# Patient Record
Sex: FEMALE | Race: WHITE | NOT HISPANIC OR LATINO | Employment: FULL TIME | URBAN - METROPOLITAN AREA
[De-identification: names, ages, dates, MRNs, and addresses within clinical notes are randomized per-mention and may not be internally consistent; named-entity substitution may affect disease eponyms.]

---

## 2021-10-22 ENCOUNTER — OFFICE VISIT (OUTPATIENT)
Dept: ORTHOPEDIC SURGERY | Facility: CLINIC | Age: 41
End: 2021-10-22

## 2021-10-22 VITALS
BODY MASS INDEX: 37.69 KG/M2 | DIASTOLIC BLOOD PRESSURE: 93 MMHG | SYSTOLIC BLOOD PRESSURE: 143 MMHG | WEIGHT: 199.6 LBS | HEART RATE: 66 BPM | HEIGHT: 61 IN

## 2021-10-22 DIAGNOSIS — E66.01 MORBID OBESITY (HCC): ICD-10-CM

## 2021-10-22 DIAGNOSIS — M67.52 PLICA OF KNEE, LEFT: ICD-10-CM

## 2021-10-22 DIAGNOSIS — M17.0 PRIMARY OSTEOARTHRITIS OF BOTH KNEES: Primary | ICD-10-CM

## 2021-10-22 PROBLEM — Z87.898 HISTORY OF SEIZURE: Status: ACTIVE | Noted: 2020-12-02

## 2021-10-22 PROBLEM — F41.1 GAD (GENERALIZED ANXIETY DISORDER): Status: ACTIVE | Noted: 2020-12-02

## 2021-10-22 PROBLEM — E03.9 HYPOTHYROIDISM: Status: ACTIVE | Noted: 2021-10-22

## 2021-10-22 PROBLEM — I10 HYPERTENSION: Status: ACTIVE | Noted: 2021-10-22

## 2021-10-22 PROCEDURE — 20610 DRAIN/INJ JOINT/BURSA W/O US: CPT | Performed by: PHYSICIAN ASSISTANT

## 2021-10-22 PROCEDURE — 99203 OFFICE O/P NEW LOW 30 MIN: CPT | Performed by: PHYSICIAN ASSISTANT

## 2021-10-22 RX ORDER — ACETAMINOPHEN, ASPIRIN AND CAFFEINE 250; 250; 65 MG/1; MG/1; MG/1
1 TABLET, FILM COATED ORAL
COMMUNITY
End: 2022-06-18

## 2021-10-22 RX ORDER — MULTIVIT-MIN/IRON/FOLIC ACID/K 18-600-40
CAPSULE ORAL
COMMUNITY
End: 2022-06-18

## 2021-10-22 RX ORDER — MELOXICAM 15 MG/1
15 TABLET ORAL DAILY
Qty: 30 TABLET | Refills: 0 | Status: SHIPPED | OUTPATIENT
Start: 2021-10-22 | End: 2022-02-01

## 2021-10-22 RX ORDER — TRIAMTERENE AND HYDROCHLOROTHIAZIDE 37.5; 25 MG/1; MG/1
1 CAPSULE ORAL DAILY
COMMUNITY
Start: 2021-07-27

## 2021-10-22 RX ORDER — TRIAMCINOLONE ACETONIDE 40 MG/ML
80 INJECTION, SUSPENSION INTRA-ARTICULAR; INTRAMUSCULAR
Status: COMPLETED | OUTPATIENT
Start: 2021-10-22 | End: 2021-10-22

## 2021-10-22 RX ORDER — LIDOCAINE HYDROCHLORIDE 10 MG/ML
2 INJECTION, SOLUTION EPIDURAL; INFILTRATION; INTRACAUDAL; PERINEURAL
Status: COMPLETED | OUTPATIENT
Start: 2021-10-22 | End: 2021-10-22

## 2021-10-22 RX ADMIN — LIDOCAINE HYDROCHLORIDE 2 ML: 10 INJECTION, SOLUTION EPIDURAL; INFILTRATION; INTRACAUDAL; PERINEURAL at 14:36

## 2021-10-22 RX ADMIN — TRIAMCINOLONE ACETONIDE 80 MG: 40 INJECTION, SUSPENSION INTRA-ARTICULAR; INTRAMUSCULAR at 14:36

## 2021-10-22 NOTE — PATIENT INSTRUCTIONS
"Osteoarthritis    Osteoarthritis is a type of arthritis. It refers to joint pain or joint disease. Osteoarthritis affects tissue that covers the ends of bones in joints (cartilage). Cartilage acts as a cushion between the bones and helps them move smoothly. Osteoarthritis occurs when cartilage in the joints gets worn down. Osteoarthritis is sometimes called \"wear and tear\" arthritis.  Osteoarthritis is the most common form of arthritis. It often occurs in older people. It is a condition that gets worse over time. The joints most often affected by this condition are in the fingers, toes, hips, knees, and spine, including the neck and lower back.  What are the causes?  This condition is caused by the wearing down of cartilage that covers the ends of bones.  What increases the risk?  The following factors may make you more likely to develop this condition:  · Being age 50 or older.  · Obesity.  · Overuse of joints.  · Past injury of a joint.  · Past surgery on a joint.  · Family history of osteoarthritis.  What are the signs or symptoms?  The main symptoms of this condition are pain, swelling, and stiffness in the joint. Other symptoms may include:  · An enlarged joint.  · More pain and further damage caused by small pieces of bone or cartilage that break off and float inside of the joint.  · Small deposits of bone (osteophytes) that grow on the edges of the joint.  · A grating or scraping feeling inside the joint when you move it.  · Popping or creaking sounds when you move.  · Difficulty walking or exercising.  · An inability to  items, twist your hand(s), or control the movements of your hands and fingers.  How is this diagnosed?  This condition may be diagnosed based on:  · Your medical history.  · A physical exam.  · Your symptoms.  · X-rays of the affected joint(s).  · Blood tests to rule out other types of arthritis.  How is this treated?  There is no cure for this condition, but treatment can help control " pain and improve joint function. Treatment may include a combination of therapies, such as:  · Pain relief techniques, such as:  ? Applying heat and cold to the joint.  ? Massage.  ? A form of talk therapy called cognitive behavioral therapy (CBT). This therapy helps you set goals and follow up on the changes that you make.  · Medicines for pain and inflammation. The medicines can be taken by mouth or applied to the skin. They include:  ? NSAIDs, such as ibuprofen.  ? Prescription medicines.  ? Strong anti-inflammatory medicines (corticosteroids).  ? Certain nutritional supplements.  · A prescribed exercise program. You may work with a physical therapist.  · Assistive devices, such as a brace, wrap, splint, specialized glove, or cane.  · A weight control plan.  · Surgery, such as:  ? An osteotomy. This is done to reposition the bones and relieve pain or to remove loose pieces of bone and cartilage.  ? Joint replacement surgery. You may need this surgery if you have advanced osteoarthritis.  Follow these instructions at home:  Activity  · Rest your affected joints as told by your health care provider.  · Exercise as told by your health care provider. He or she may recommend specific types of exercise, such as:  ? Strengthening exercises. These are done to strengthen the muscles that support joints affected by arthritis.  ? Aerobic activities. These are exercises, such as brisk walking or water aerobics, that increase your heart rate.  ? Range-of-motion activities. These help your joints move more easily.  ? Balance and agility exercises.  Managing pain, stiffness, and swelling         · If directed, apply heat to the affected area as often as told by your health care provider. Use the heat source that your health care provider recommends, such as a moist heat pack or a heating pad.  ? If you have a removable assistive device, remove it as told by your health care provider.  ? Place a towel between your skin and the  heat source. If your health care provider tells you to keep the assistive device on while you apply heat, place a towel between the assistive device and the heat source.  ? Leave the heat on for 20-30 minutes.  ? Remove the heat if your skin turns bright red. This is especially important if you are unable to feel pain, heat, or cold. You may have a greater risk of getting burned.  · If directed, put ice on the affected area. To do this:  ? If you have a removable assistive device, remove it as told by your health care provider.  ? Put ice in a plastic bag.  ? Place a towel between your skin and the bag. If your health care provider tells you to keep the assistive device on during icing, place a towel between the assistive device and the bag.  ? Leave the ice on for 20 minutes, 2-3 times a day.  ? Move your fingers or toes often to reduce stiffness and swelling.  ? Raise (elevate) the injured area above the level of your heart while you are sitting or lying down.  General instructions  · Take over-the-counter and prescription medicines only as told by your health care provider.  · Maintain a healthy weight. Follow instructions from your health care provider for weight control.  · Do not use any products that contain nicotine or tobacco, such as cigarettes, e-cigarettes, and chewing tobacco. If you need help quitting, ask your health care provider.  · Use assistive devices as told by your health care provider.  · Keep all follow-up visits as told by your health care provider. This is important.  Where to find more information  · [place] of Arthritis and Musculoskeletal and Skin Diseases: www.niams.nih.gov  · [place] on Aging: www.aysha.nih.gov  · American College of Rheumatology: www.rheumatology.org  Contact a health care provider if:  · You have redness, swelling, or a feeling of warmth in a joint that gets worse.  · You have a fever along with joint or muscle aches.  · You develop a rash.  · You  have trouble doing your normal activities.  Get help right away if:  · You have pain that gets worse and is not relieved by pain medicine.  Summary  · Osteoarthritis is a type of arthritis that affects tissue covering the ends of bones in joints (cartilage).  · This condition is caused by the wearing down of cartilage that covers the ends of bones.  · The main symptom of this condition is pain, swelling, and stiffness in the joint.  · There is no cure for this condition, but treatment can help control pain and improve joint function.  This information is not intended to replace advice given to you by your health care provider. Make sure you discuss any questions you have with your health care provider.  Document Revised: 12/14/2020 Document Reviewed: 12/14/2020  [employer] Patient Education © 2021 [employer]      Preventing Health Risks of Being Overweight  Maintaining a healthy body weight is an important part of your overall health. Your healthy body weight depends on your age, gender, and height. Being overweight puts you at risk for many health problems, including:  · Heart disease.  · Diabetes.  · Problems sleeping.  · Joint problems.  You can make changes to your diet and lifestyle to prevent these risks. Consider working with a health care provider or a dietitian to make these changes.  What nutrition changes can be made?    · Eat only as much as your body needs. In most cases, this is about 2,000 calories a day, but the amount varies depending on your height, gender, and activity level. Ask your health care provider how many calories you should have each day. Eating more than your body needs on a regular basis can cause you to become overweight or obese.  · Eat slowly, and stop eating when you feel full.  · Choose healthy foods, including:  ? Fruits and vegetables.  ? Lean meats.  ? Low-fat dairy products.  ? High-fiber foods, such as whole grains and beans.  ? Healthy snacks like vegetable sticks, a piece  of fruit, or a small amount of yogurt or cheese.  · Avoid foods and drinks that are high in sugar, salt (sodium), saturated fat, or trans fat. This includes:  ? Many desserts such as candy, cookies, and ice cream.  ? Soda.  ? Fried foods.  ? Processed meats such as hot dogs or lunch meats.  ? Prepackaged snack foods.  What lifestyle changes can be made?    · Exercise for at least 150 minutes a week to prevent weight gain, or as often as recommended by your health care provider. Do moderate-intensity exercise, such as brisk walking.  ? Spread it out by exercising for 30 minutes 5 days a week, or in short 10-minute bursts several times a day.  · Find other ways to stay active and burn calories, such as yard work or a hobby that involves physical activity.  · Get at least 8 hours of sleep each night. When you are well-rested, you are more likely to be active and make healthy choices during the day. To sleep better:  ? Try to go to bed and wake up at about the same time every day.  ? Keep your bedroom dark, quiet, and cool.  ? Make sure that your bed is comfortable.  ? Avoid stimulating activities, such as watching television or exercising, for at least one hour before bedtime.  Why are these changes important?  Eating healthy and being active helps you lose weight and prevent health problems caused by being overweight. Making these changes can also help you manage stress, feel better mentally, and connect with friends and family.  What can happen if changes are not made?  Being overweight can affect you for your entire life. You may develop joint or bone problems that make it painful or difficult for you to play sports or do activities you enjoy. Being overweight puts stress on your heart and lungs and can lead to medical problems like diabetes, heart disease, and sleeping problems.  Where to find support  You can get support for preventing health risks of being overweight from:  · Your health care provider or a  dietitian. They can provide guidance about healthy eating and healthy lifestyle choices.  · Weight loss support groups, online or in-person.  Where to find more information  · MyPlate: www.choosemyplate.gov  ? This an online tool that provides personalized recommendations about foods to eat each day.  · The Centers for Disease Control and Prevention: www.cdc.gov/healthyweight  ? This resource gives tips for managing weight and having an active lifestyle.  Summary  · To prevent unhealthy weight gain, it is important to maintain a healthy diet high in vegetables and whole grains, exercise regularly, and get at least 8 hours of sleep each night.  · Making these changes helps prevent many long-term (chronic) health conditions that can shorten your life, such as diabetes, heart disease, and stroke.  This information is not intended to replace advice given to you by your health care provider. Make sure you discuss any questions you have with your health care provider.  Document Revised: 04/15/2021 Document Reviewed: 04/15/2021  [employer] Patient Education © 2021 [employer]      Knee Injection  A knee injection is a procedure to get medicine into your knee joint to relieve the pain, swelling, and stiffness of arthritis. Your health care provider uses a needle to inject medicine, which may also help to lubricate and cushion your knee joint. You may need more than one injection.  Tell a health care provider about:  · Any allergies you have.  · All medicines you are taking, including vitamins, herbs, eye drops, creams, and over-the-counter medicines.  · Any problems you or family members have had with anesthetic medicines.  · Any blood disorders you have.  · Any surgeries you have had.  · Any medical conditions you have.  · Whether you are pregnant or may be pregnant.  What are the risks?  Generally, this is a safe procedure. However, problems may occur, including:  · Infection.  · Bleeding.  · Symptoms that get  worse.  · Damage to the area around your knee.  · Allergic reaction to any of the medicines.  · Skin reactions from repeated injections.  What happens before the procedure?  · Ask your health care provider about:  ? Changing or stopping your regular medicines. This is especially important if you are taking diabetes medicines or blood thinners.  ? Taking medicines such as aspirin and ibuprofen. These medicines can thin your blood. Do not take these medicines unless your health care provider tells you to take them.  ? Taking over-the-counter medicines, vitamins, herbs, and supplements.  · Plan to have a responsible adult take you home from the hospital or clinic.  What happens during the procedure?    · You will sit or lie down in a position for your knee to be treated.  · The skin over your kneecap will be cleaned with a germ-killing soap.  · You will be given a medicine that numbs the area (local anesthetic). You may feel some stinging.  · The medicine will be injected into your knee. The needle is carefully placed between your kneecap and your knee. The medicine is injected into the joint space.  · The needle will be removed at the end of the procedure.  · A bandage (dressing) may be placed over the injection site.  The procedure may vary among health care providers and hospitals.  What can I expect after the procedure?  · Your blood pressure, heart rate, breathing rate, and blood oxygen level will be monitored until you leave the hospital or clinic.  · You may have to move your knee through its full range of motion. This helps to get all the medicine into your joint space.  · You will be watched to make sure that you do not have a reaction to the injected medicine.  · You may feel more pain, swelling, and warmth than you did before the injection. This reaction may last about 1-2 days.  Follow these instructions at home:  Medicines  · Take over-the-counter and prescription medicines only as told by your health care  provider.  · Ask your health care provider if the medicine prescribed to you requires you to avoid driving or using machinery.  · Do not take medicines such as aspirin and ibuprofen unless your health care provider tells you to take them.  Injection site care  · Follow instructions from your health care provider about:  ? How to take care of your puncture site.  ? When and how you should change your dressing.  ? When you should remove your dressing.  · Check your injection area every day for signs of infection. Check for:  ? More redness, swelling, or pain after 2 days.  ? Fluid or blood.  ? Pus or a bad smell.  ? Warmth.  Managing pain, stiffness, and swelling    · If directed, put ice on the injection area. To do this:  ? Put ice in a plastic bag.  ? Place a towel between your skin and the bag.  ? Leave the ice on for 20 minutes, 2-3 times per day.  ? Remove the ice if your skin turns bright red. This is very important. If you cannot feel pain, heat, or cold, you have a greater risk of damage to the area.  · Do not apply heat to your knee.  · Raise (elevate) the injection area above the level of your heart while you are sitting or lying down.    General instructions  · If you were given a dressing, keep it dry until your health care provider says it can be removed. Ask your health care provider when you can start showering or bathing.  · Avoid strenuous activities for as long as directed by your health care provider. Ask your health care provider when you can return to your normal activities.  · Keep all follow-up visits. This is important. You may need more injections.  Contact a health care provider if you have:  · A fever.  · Warmth in your injection area.  · Fluid, blood, or pus coming from your injection site.  · Symptoms at your injection site that last longer than 2 days after your procedure.  Get help right away if:  · Your knee turns very red.  · Your knee becomes very swollen.  · Your knee is in severe  pain.  Summary  · A knee injection is a procedure to get medicine into your knee joint to relieve the pain, swelling, and stiffness of arthritis.  · A needle is carefully placed between your kneecap and your knee to inject medicine into the joint space.  · Before the procedure, ask your health care provider about changing or stopping your regular medicines, especially if you are taking diabetes medicines or blood thinners.  · Contact your health care provider if you have any problems or questions after your procedure.  This information is not intended to replace advice given to you by your health care provider. Make sure you discuss any questions you have with your health care provider.  Document Revised: 06/02/2021 Document Reviewed: 06/02/2021  [employer] Patient Education © 2021 [employer]

## 2021-10-22 NOTE — PROGRESS NOTES
ORTHOPEDIC VISIT    Referring Provider: No ref. provider found  Primary Care Provider: Provider, No Known         Subjective:  Chief Complaint:  Chief Complaint   Patient presents with   • Left Knee - Initial Evaluation     X 6 months, nki   • Right Knee - Initial Evaluation     X 1 month, nki       HPI:  Aida Peralta is a 41 y.o. female who presents for initial visit for bilateral knee pain.  Her left knee pain has been ongoing since April and her right knee pain is been ongoing for the last 1 to 2 months.  She denies any specific injuries.  She describes both a dull, achy pain, as well as an intermittent sharp, shooting pain, mainly located on the medial aspect of her knees.  She does report some radiation down her legs.  She denies any numbness or tingling.  The pain does increase with weightbearing.  She reports mechanical symptoms, as well as occasional instability.  She has tried multiple anti-inflammatories without success.  She denies any previous history of surgery on the knees.  She has previously had a left knee intra-articular steroid injection on 1/19/2021, that did not seem to help with her discomfort.    Past Medical History:   Diagnosis Date   • Hypertension    • Migraines        Past Surgical History:   Procedure Laterality Date   • CHOLECYSTECTOMY     • TUBAL ABDOMINAL LIGATION         History reviewed. No pertinent family history.    Social History     Occupational History   • Not on file   Tobacco Use   • Smoking status: Never Smoker   • Smokeless tobacco: Never Used   Vaping Use   • Vaping Use: Never used   Substance and Sexual Activity   • Alcohol use: Yes     Comment: socially   • Drug use: Never   • Sexual activity: Defer        Medications:    Current Outpatient Medications:   •  aspirin-acetaminophen-caffeine (EXCEDRIN MIGRAINE) 250-250-65 MG per tablet, Take 1 tablet by mouth., Disp: , Rfl:   •  Cholecalciferol (Vitamin D) 50 MCG (2000 UT) capsule, Take  by mouth., Disp: , Rfl:   •   "triamterene-hydrochlorothiazide (DYAZIDE) 37.5-25 MG per capsule, Take 1 capsule by mouth Daily., Disp: , Rfl:   •  meloxicam (MOBIC) 15 MG tablet, Take 1 tablet by mouth Daily. Prn joint pain, Disp: 30 tablet, Rfl: 0    Allergies:  Allergies   Allergen Reactions   • Lisinopril Itching         Review of Systems:  Gen -no fever, chills , sweats, headache   Eyes - no irritation or discharge   ENT -  no ear pain , runny nose , sore throat , difficulty swallowing   Resp - no cough , congestion , excessive expectoration   CVS - no chest pain , palpitations.   Abd - no pain , nausea , vomiting , diarrhea   Skin - no rash , lesions.   Neuro - no dizziness    Please see HPI for any other pertinent positives.  All other systems were reviewed and are negative.       Objective   Objective:    /93 (BP Location: Left arm, Patient Position: Sitting, Cuff Size: Large Adult)   Pulse 66   Ht 154.9 cm (61\")   Wt 90.5 kg (199 lb 9.6 oz)   BMI 37.71 kg/m²     Physical Examination:  Alert, oriented, obese individual in no acute distress, ambulating unassisted  Right lower extremity shows no erythema, rashes, or open skin lesions. There is a mild amount of swelling. It is grossly well aligned, and the patient is neurovascularly intact distally. The knee is stable to varus and valgus stress, there is no patellar maltracking or crepitus noted, and plantar and dorsiflexion is 5/5. There is mild tenderness to palpation over the medial joint line and with range of motion, which is about 0-130.  Negative Olena's.  Left lower extremity shows no erythema, rashes, or open skin lesions. There is a mild amount of swelling. It is grossly well aligned, and the patient is neurovascularly intact distally. The knee is stable to varus and valgus stress, there is no patellar maltracking or crepitus noted, and plantar and dorsiflexion is 5/5.  Lateral plica palpable with tenderness.  There is mild tenderness to palpation over the medial joint " line and with range of motion, which is about 0-120.  Positive Olena's.           Imaging:  xrays obtained today  bilateral Knee X-Ray    Date of exam: 10/22/2021    Indication: Bilateral knee pain    AP, Lateral, [place] views    Findings: Shows right knee mild tricompartmental DJD, worse in the medial compartment.  Shows left knee mild to moderate tricompartmental DJD, worse in the medial compartment and No fractures or dislocations are appreciated.    decreased joint spaces    Hardware appropriately positioned not applicable    yes prior studies available for comparison.    This patient's x-ray report was graded according to the Kellgren and Rangel classification.  This took into account the joint space narrowing, osteophyte formation, sclerosis of the distal femur/proximal tibia along with deformity of those bones.  The findings were indicative of K L grade 2-3.    X-RAY was ordered and reviewed by JAVIER Abad    MRI of the left knee done on 12/9/2020 at [facility]  Impression:  1.  Limited examination due to patient motion.  2.  Small joint effusion, small Baker's cyst, and small posterior medial synovial cyst.  3.  No gross fracture or dislocation.  No gross meniscal tear or cruciate, collateral ligament injury.  4.  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          Assessment:  1. Primary osteoarthritis of both knees    2. Plica of knee, left    3. Morbid obesity (HCC)                 Plan:  I am recommending treatment with conservative measures at this time.  Weight loss is highly recommended.  I have asked her to stop all over-the-counter anti-inflammatories, and we will try meloxicam.  Intra-articular steroid injections today, risks and benefits were discussed and postinjection instructions were given.  We did also discuss future use with viscosupplementation.  She will be fitted for bilateral hinged knee braces today to help with stability and fall prevention.  I will plan to see her  back in 3 months for recheck.  Natural history and expected course discussed. Questions answered.  Educational materials distributed.  Rest, ice, compression, and elevation (RICE) therapy.  Reduction in offending activity.  NSAIDs per medication orders.  OTC analgesics as needed.  Arthrocentesis. See procedure note.  cortisone injections  viscosupplementation  physical therapy  bracing  weight loss  activtiy modification  Large Joint Arthrocentesis  Date/Time: 10/22/2021 2:36 PM  Consent given by: patient  Timeout: Immediately prior to procedure a time out was called to verify the correct patient, procedure, equipment, support staff and site/side marked as required   Supporting Documentation  Indications: pain   Procedure Details  Location: knee - Knee joint: Bilateral.  Preparation: Patient was prepped and draped in the usual sterile fashion  Needle size: 25 G  Approach: anterolateral  Medications administered: 2 mL lidocaine PF 1% 1 %; 80 mg triamcinolone acetonide 40 MG/ML  Patient tolerance: patient tolerated the procedure well with no immediate complications       After consent was obtained and a time out was properly performed, the right knee was prepped with alcohol and chlorhexidine. Sterile technique was used, along with a 25 gauge needle, to inject 2 cc of 1% lidocaine and 2 cc of 40 mg/mL kenalog into the knee. The patient tolerated the procedure well.  After consent was obtained and a time out was properly performed, the left knee was prepped with alcohol and chlorhexidine. Sterile technique was used, along with a 25 gauge needle, to inject 2 cc of 1% lidocaine and 2 cc of 40 mg/mL kenalog into the knee. The patient tolerated the procedure well.               JAVIER Abad  10/22/21  14:35 EDT    EMR Dragon/Transcription disclaimer:  Much of this encounter note is an electronic transcription/translation of spoken language to printed text. The electronic translation of spoken language may permit  erroneous, or at times, nonsensical words or phrases to be inadvertently transcribed; Although I have reviewed the note for such errors, some may still exist.

## 2022-02-01 ENCOUNTER — OFFICE VISIT (OUTPATIENT)
Dept: ORTHOPEDIC SURGERY | Facility: CLINIC | Age: 42
End: 2022-02-01

## 2022-02-01 VITALS
SYSTOLIC BLOOD PRESSURE: 152 MMHG | BODY MASS INDEX: 39.12 KG/M2 | HEIGHT: 61 IN | WEIGHT: 207.2 LBS | DIASTOLIC BLOOD PRESSURE: 104 MMHG | HEART RATE: 83 BPM

## 2022-02-01 DIAGNOSIS — M17.0 PRIMARY OSTEOARTHRITIS OF BOTH KNEES: Primary | ICD-10-CM

## 2022-02-01 DIAGNOSIS — E66.01 MORBID OBESITY: ICD-10-CM

## 2022-02-01 DIAGNOSIS — M67.52 PLICA OF KNEE, LEFT: ICD-10-CM

## 2022-02-01 PROCEDURE — 20610 DRAIN/INJ JOINT/BURSA W/O US: CPT | Performed by: PHYSICIAN ASSISTANT

## 2022-02-01 PROCEDURE — 99213 OFFICE O/P EST LOW 20 MIN: CPT | Performed by: PHYSICIAN ASSISTANT

## 2022-02-01 RX ORDER — TRIAMCINOLONE ACETONIDE 40 MG/ML
80 INJECTION, SUSPENSION INTRA-ARTICULAR; INTRAMUSCULAR
Status: COMPLETED | OUTPATIENT
Start: 2022-02-01 | End: 2022-02-01

## 2022-02-01 RX ORDER — LIDOCAINE HYDROCHLORIDE 10 MG/ML
2 INJECTION, SOLUTION EPIDURAL; INFILTRATION; INTRACAUDAL; PERINEURAL
Status: COMPLETED | OUTPATIENT
Start: 2022-02-01 | End: 2022-02-01

## 2022-02-01 RX ORDER — TRAMADOL HYDROCHLORIDE 50 MG/1
50 TABLET ORAL EVERY 8 HOURS PRN
Qty: 20 TABLET | Refills: 0 | Status: SHIPPED | OUTPATIENT
Start: 2022-02-01 | End: 2022-03-02

## 2022-02-01 RX ADMIN — TRIAMCINOLONE ACETONIDE 80 MG: 40 INJECTION, SUSPENSION INTRA-ARTICULAR; INTRAMUSCULAR at 09:08

## 2022-02-01 RX ADMIN — LIDOCAINE HYDROCHLORIDE 2 ML: 10 INJECTION, SOLUTION EPIDURAL; INFILTRATION; INTRACAUDAL; PERINEURAL at 09:08

## 2022-02-01 NOTE — PATIENT INSTRUCTIONS
Knee Injection  A knee injection is a procedure to get medicine into your knee joint to relieve the pain, swelling, and stiffness of arthritis. Your health care provider uses a needle to inject medicine, which may also help to lubricate and cushion your knee joint. You may need more than one injection.  Tell a health care provider about:  · Any allergies you have.  · All medicines you are taking, including vitamins, herbs, eye drops, creams, and over-the-counter medicines.  · Any problems you or family members have had with anesthetic medicines.  · Any blood disorders you have.  · Any surgeries you have had.  · Any medical conditions you have.  · Whether you are pregnant or may be pregnant.  What are the risks?  Generally, this is a safe procedure. However, problems may occur, including:  · Infection.  · Bleeding.  · Symptoms that get worse.  · Damage to the area around your knee.  · Allergic reaction to any of the medicines.  · Skin reactions from repeated injections.  What happens before the procedure?  · Ask your health care provider about:  ? Changing or stopping your regular medicines. This is especially important if you are taking diabetes medicines or blood thinners.  ? Taking medicines such as aspirin and ibuprofen. These medicines can thin your blood. Do not take these medicines unless your health care provider tells you to take them.  ? Taking over-the-counter medicines, vitamins, herbs, and supplements.  · Plan to have a responsible adult take you home from the hospital or clinic.  What happens during the procedure?    · You will sit or lie down in a position for your knee to be treated.  · The skin over your kneecap will be cleaned with a germ-killing soap.  · You will be given a medicine that numbs the area (local anesthetic). You may feel some stinging.  · The medicine will be injected into your knee. The needle is carefully placed between your kneecap and your knee. The medicine is injected into the  joint space.  · The needle will be removed at the end of the procedure.  · A bandage (dressing) may be placed over the injection site.  The procedure may vary among health care providers and hospitals.  What can I expect after the procedure?  · Your blood pressure, heart rate, breathing rate, and blood oxygen level will be monitored until you leave the hospital or clinic.  · You may have to move your knee through its full range of motion. This helps to get all the medicine into your joint space.  · You will be watched to make sure that you do not have a reaction to the injected medicine.  · You may feel more pain, swelling, and warmth than you did before the injection. This reaction may last about 1-2 days.  Follow these instructions at home:  Medicines  · Take over-the-counter and prescription medicines only as told by your health care provider.  · Ask your health care provider if the medicine prescribed to you requires you to avoid driving or using machinery.  · Do not take medicines such as aspirin and ibuprofen unless your health care provider tells you to take them.  Injection site care  · Follow instructions from your health care provider about:  ? How to take care of your puncture site.  ? When and how you should change your dressing.  ? When you should remove your dressing.  · Check your injection area every day for signs of infection. Check for:  ? More redness, swelling, or pain after 2 days.  ? Fluid or blood.  ? Pus or a bad smell.  ? Warmth.  Managing pain, stiffness, and swelling    · If directed, put ice on the injection area. To do this:  ? Put ice in a plastic bag.  ? Place a towel between your skin and the bag.  ? Leave the ice on for 20 minutes, 2-3 times per day.  ? Remove the ice if your skin turns bright red. This is very important. If you cannot feel pain, heat, or cold, you have a greater risk of damage to the area.  · Do not apply heat to your knee.  · Raise (elevate) the injection area  above the level of your heart while you are sitting or lying down.    General instructions  · If you were given a dressing, keep it dry until your health care provider says it can be removed. Ask your health care provider when you can start showering or bathing.  · Avoid strenuous activities for as long as directed by your health care provider. Ask your health care provider when you can return to your normal activities.  · Keep all follow-up visits. This is important. You may need more injections.  Contact a health care provider if you have:  · A fever.  · Warmth in your injection area.  · Fluid, blood, or pus coming from your injection site.  · Symptoms at your injection site that last longer than 2 days after your procedure.  Get help right away if:  · Your knee turns very red.  · Your knee becomes very swollen.  · Your knee is in severe pain.  Summary  · A knee injection is a procedure to get medicine into your knee joint to relieve the pain, swelling, and stiffness of arthritis.  · A needle is carefully placed between your kneecap and your knee to inject medicine into the joint space.  · Before the procedure, ask your health care provider about changing or stopping your regular medicines, especially if you are taking diabetes medicines or blood thinners.  · Contact your health care provider if you have any problems or questions after your procedure.  This information is not intended to replace advice given to you by your health care provider. Make sure you discuss any questions you have with your health care provider.  Document Revised: 06/02/2021 Document Reviewed: 06/02/2021  [employer] Patient Education © 2021 [employer]

## 2022-02-01 NOTE — PROGRESS NOTES
ORTHO FOLLOW UP       Subjective:    HPI:   Aida Peralta is a 41 y.o. female who presents in follow-up for bilateral knee pain with a known history of bilateral knee DJD.  She last had bilateral intra-articular steroid injections on 10/22/2021, which did help some to relieve her pain.  She reports that she is using Aleve sometimes at bedtime.  She is about to go to [place] and is concerned about her pain control.      Past Medical History:   Diagnosis Date   • Hypertension    • Migraines    • Plica of knee, left 10/22/2021   • Primary osteoarthritis of both knees 10/22/2021       Past Surgical History:   Procedure Laterality Date   • CHOLECYSTECTOMY     • TUBAL ABDOMINAL LIGATION         Social History     Occupational History   • Not on file   Tobacco Use   • Smoking status: Never Smoker   • Smokeless tobacco: Never Used   Vaping Use   • Vaping Use: Never used   Substance and Sexual Activity   • Alcohol use: Yes     Comment: socially   • Drug use: Never   • Sexual activity: Defer      The following portions of the patient's history were reviewed and updated as appropriate: allergies, current medications, past family history, past medical history, past social history, past surgical history and problem list.    Medications:    Current Outpatient Medications:   •  aspirin-acetaminophen-caffeine (EXCEDRIN MIGRAINE) 250-250-65 MG per tablet, Take 1 tablet by mouth., Disp: , Rfl:   •  Cholecalciferol (Vitamin D) 50 MCG (2000 UT) capsule, Take  by mouth., Disp: , Rfl:   •  triamterene-hydrochlorothiazide (DYAZIDE) 37.5-25 MG per capsule, Take 1 capsule by mouth Daily., Disp: , Rfl:   •  traMADol (ULTRAM) 50 MG tablet, Take 1 tablet by mouth Every 8 (Eight) Hours As Needed for Moderate Pain ., Disp: 20 tablet, Rfl: 0    Allergies:  Allergies   Allergen Reactions   • Lisinopril Itching       Review of Systems:  Gen -no fever, chills , sweats, headache   Eyes - no irritation or discharge   ENT -  no ear pain , runny nose  ", sore throat , difficulty swallowing   Resp - no cough , congestion , excessive expectoration   CVS - no chest pain , palpitations.   Abd - no pain , nausea , vomiting , diarrhea   Skin - no rash , lesions.   Neuro - no dizziness    Please see HPI for any other pertinent positives.  All other systems were reviewed and are negative.       Objective   Objective:    BP (!) 152/104 (BP Location: Left arm, Patient Position: Sitting, Cuff Size: Large Adult)   Pulse 83   Ht 154.9 cm (61\")   Wt 94 kg (207 lb 3.2 oz)   BMI 39.15 kg/m²     Physical Examination:  Alert, oriented, obese individual in no acute distress, ambulating unassisted  Right lower extremity shows no erythema, rashes, or open skin lesions. There is a minimal amount of swelling. It is grossly well aligned, and the patient is neurovascularly intact distally. The knee is stable to varus and valgus stress, there is no patellar maltracking or crepitus noted, and plantar and dorsiflexion is 5/5. There is mild tenderness to palpation over the medial joint line and with range of motion, which is about 0-130.   Left lower extremity shows no erythema, rashes, or open skin lesions. There is a minimal amount of swelling. It is grossly well aligned, and the patient is neurovascularly intact distally. The knee is stable to varus and valgus stress, there is no patellar maltracking or crepitus noted, and plantar and dorsiflexion is 5/5.  Lateral plica palpable with tenderness.  There is mild tenderness to palpation over the medial joint line and with range of motion, which is about 0-120.         Imaging:  no diagnostic testing performed this visit            Assessment:  1. Primary osteoarthritis of both knees    2. Plica of knee, left    3. Morbid obesity (HCC)                 Plan:  We will continue conservative treatment measures at this time.  Bilateral intra-articular steroid injections today, risks and benefits were discussed and postinjection instructions " "were given.  Continue weight loss efforts.  We did discuss future use with Visco supplementation.  Over-the-counter anti-inflammatories as needed.  I will give her a small amount of tramadol to use as needed while at [place], risks and addiction properties were discussed.  I will plan to see her back in 3 months for recheck.  Large Joint Arthrocentesis  Date/Time: 2/1/2022 9:08 AM  Consent given by: patient  Timeout: Immediately prior to procedure a time out was called to verify the correct patient, procedure, equipment, support staff and site/side marked as required   Supporting Documentation  Indications: pain   Procedure Details  Location: knee - Knee joint: Bilateral.  Preparation: Patient was prepped and draped in the usual sterile fashion  Needle size: 25 G  Approach: anterolateral  Medications administered: 2 mL lidocaine PF 1% 1 %; 80 mg triamcinolone acetonide 40 MG/ML  Patient tolerance: patient tolerated the procedure well with no immediate complications      After consent was obtained and a time out was properly performed, the right knee was prepped with alcohol and chlorhexidine. Sterile technique was used, along with a 25 gauge needle, to inject 2 cc of 1% lidocaine and 2 cc of 40 mg/mL kenalog into the knee. The patient tolerated the procedure well.  After consent was obtained and a time out was properly performed, the left knee was prepped with alcohol and chlorhexidine. Sterile technique was used, along with a 25 gauge needle, to inject 2 cc of 1% lidocaine and 2 cc of 40 mg/mL kenalog into the knee. The patient tolerated the procedure well.           JAVIER Abad  02/01/22  09:06 EST    \"Please note that portions of this note were completed with a voice recognition program\".   "

## 2022-03-02 ENCOUNTER — OFFICE VISIT (OUTPATIENT)
Dept: ORTHOPEDIC SURGERY | Facility: CLINIC | Age: 42
End: 2022-03-02

## 2022-03-02 VITALS
WEIGHT: 203.6 LBS | HEIGHT: 61 IN | DIASTOLIC BLOOD PRESSURE: 116 MMHG | SYSTOLIC BLOOD PRESSURE: 155 MMHG | BODY MASS INDEX: 38.44 KG/M2 | HEART RATE: 77 BPM

## 2022-03-02 DIAGNOSIS — M17.0 PRIMARY OSTEOARTHRITIS OF BOTH KNEES: Primary | ICD-10-CM

## 2022-03-02 DIAGNOSIS — E66.01 MORBID OBESITY: ICD-10-CM

## 2022-03-02 DIAGNOSIS — M67.52 PLICA OF KNEE, LEFT: ICD-10-CM

## 2022-03-02 PROCEDURE — 99213 OFFICE O/P EST LOW 20 MIN: CPT | Performed by: PHYSICIAN ASSISTANT

## 2022-03-02 RX ORDER — DICLOFENAC SODIUM 75 MG/1
75 TABLET, DELAYED RELEASE ORAL 2 TIMES DAILY
Qty: 60 TABLET | Refills: 2 | Status: SHIPPED | OUTPATIENT
Start: 2022-03-02 | End: 2022-06-18

## 2022-03-02 RX ORDER — NAPROXEN SODIUM 220 MG
220 TABLET ORAL 2 TIMES DAILY PRN
COMMUNITY
End: 2022-03-02 | Stop reason: ALTCHOICE

## 2022-03-02 RX ORDER — PYRAZINAMIDE 500 MG/1
1-2 TABLET ORAL EVERY 6 HOURS PRN
Qty: 15 TABLET | Refills: 0 | Status: SHIPPED | OUTPATIENT
Start: 2022-03-02 | End: 2022-06-18

## 2022-03-02 NOTE — PROGRESS NOTES
ORTHO FOLLOW UP       Subjective:    HPI:   Aida Peralta is a 41 y.o. female who presents in follow-up for bilateral knee pain with a known history of bilateral knee DJD.  She last had bilateral intra-articular steroid injections on 2/1/2022, with a positive but inadequate response. She has previously tried meloxicam, which did not seem to help with her discomfort. She is currently using over-the-counter Aleve, which also no longer seems to be helping with her pain. She would like to proceed with viscosupplementation at this time.      Past Medical History:   Diagnosis Date   • Hypertension    • Migraines    • Plica of knee, left 10/22/2021   • Primary osteoarthritis of both knees 10/22/2021       Past Surgical History:   Procedure Laterality Date   • CHOLECYSTECTOMY     • TUBAL ABDOMINAL LIGATION         Social History     Occupational History   • Not on file   Tobacco Use   • Smoking status: Never Smoker   • Smokeless tobacco: Never Used   Vaping Use   • Vaping Use: Never used   Substance and Sexual Activity   • Alcohol use: Yes     Comment: socially   • Drug use: Never   • Sexual activity: Defer      The following portions of the patient's history were reviewed and updated as appropriate: allergies, current medications, past family history, past medical history, past social history, past surgical history and problem list.    Medications:    Current Outpatient Medications:   •  aspirin-acetaminophen-caffeine (EXCEDRIN MIGRAINE) 250-250-65 MG per tablet, Take 1 tablet by mouth., Disp: , Rfl:   •  Cholecalciferol (Vitamin D) 50 MCG (2000 UT) capsule, Take  by mouth., Disp: , Rfl:   •  triamterene-hydrochlorothiazide (DYAZIDE) 37.5-25 MG per capsule, Take 1 capsule by mouth Daily., Disp: , Rfl:   •  acetaminophen-codeine (TYLENOL/CODEINE #3) 300-30 MG per tablet, Take 1-2 tablets by mouth Every 6 (Six) Hours As Needed for Moderate Pain ., Disp: 15 tablet, Rfl: 0  •  diclofenac (VOLTAREN) 75 MG EC tablet, Take 1  "tablet by mouth 2 (Two) Times a Day. Prn joint pain, Disp: 60 tablet, Rfl: 2    Allergies:  Allergies   Allergen Reactions   • Lisinopril Itching       Review of Systems:  Gen -no fever, chills , sweats, headache   Eyes - no irritation or discharge   ENT -  no ear pain , runny nose , sore throat , difficulty swallowing   Resp - no cough , congestion , excessive expectoration   CVS - no chest pain , palpitations.   Abd - no pain , nausea , vomiting , diarrhea   Skin - no rash , lesions.   Neuro - no dizziness    Please see HPI for any other pertinent positives.  All other systems were reviewed and are negative.       Objective   Objective:    BP (!) 155/116 (BP Location: Right arm, Patient Position: Sitting, Cuff Size: Large Adult)   Pulse 77   Ht 154.9 cm (61\")   Wt 92.4 kg (203 lb 9.6 oz)   BMI 38.47 kg/m²     Physical Examination:  Alert, oriented, obese individual in no acute distress, ambulating unassisted  Right lower extremity shows no erythema, rashes, or open skin lesions. There is a mild amount of swelling. It is grossly well aligned, and the patient is neurovascularly intact distally. The knee is stable to varus and valgus stress, there is no patellar maltracking or crepitus noted, and plantar and dorsiflexion is 5/5. There is mild tenderness to palpation over the medial joint line and with range of motion, which is about 0-130.   Left lower extremity shows no erythema, rashes, or open skin lesions. There is a mild amount of swelling. It is grossly well aligned, and the patient is neurovascularly intact distally. The knee is stable to varus and valgus stress, there is no patellar maltracking or crepitus noted, and plantar and dorsiflexion is 5/5.  Lateral plica palpable with tenderness.  There is mild tenderness to palpation over the medial joint line and with range of motion, which is about 0-120.         Imaging:  no diagnostic testing performed this visit            Assessment:  1. Primary " "osteoarthritis of both knees    2. Plica of knee, left    3. Morbid obesity (HCC)                 Plan:  We we will attempt to get viscosupplementation approved by her insurance company. Continue weight loss efforts. I have asked her to stop all over-the-counter anti-inflammatories, we will try diclofenac. I will also give her a small amount of Tylenol 3 to use for severe pain, risks and addiction properties were discussed. She will be notified when medication is available here in the office. She should call with any questions or concerns.          JAVIER Abad  03/02/22  08:47 EST    \"Please note that portions of this note were completed with a voice recognition program\".   "

## 2022-03-25 ENCOUNTER — TELEPHONE (OUTPATIENT)
Dept: ORTHOPEDIC SURGERY | Facility: CLINIC | Age: 42
End: 2022-03-25

## 2022-03-25 NOTE — TELEPHONE ENCOUNTER
Provider: SYDNEE PATTEN  Caller: DEANNE NAPOLES  Relationship to Patient: SELF  Phone Number: [phone]  Reason for Call: PATIENT CALLED STATING SHE RECEIVED A LETTER FROM INSURANCE AUTHORIZING HER VISCO TREATMENT. PATIENT IS REQUESTING CALL BACK TO SCHEDULE ORTHOVISC/MONOVISC INJECTION. LAST INJECTION WAS 02/01/22. PLEASE ADVISE.

## 2022-03-31 ENCOUNTER — OFFICE VISIT (OUTPATIENT)
Dept: ORTHOPEDIC SURGERY | Facility: CLINIC | Age: 42
End: 2022-03-31

## 2022-03-31 VITALS
HEART RATE: 83 BPM | DIASTOLIC BLOOD PRESSURE: 103 MMHG | SYSTOLIC BLOOD PRESSURE: 148 MMHG | WEIGHT: 201.8 LBS | HEIGHT: 62 IN | OXYGEN SATURATION: 97 % | BODY MASS INDEX: 37.13 KG/M2

## 2022-03-31 DIAGNOSIS — M67.52 PLICA OF KNEE, LEFT: ICD-10-CM

## 2022-03-31 DIAGNOSIS — M17.0 PRIMARY OSTEOARTHRITIS OF BOTH KNEES: Primary | ICD-10-CM

## 2022-03-31 DIAGNOSIS — E66.01 MORBID OBESITY: ICD-10-CM

## 2022-03-31 PROCEDURE — 20610 DRAIN/INJ JOINT/BURSA W/O US: CPT | Performed by: PHYSICIAN ASSISTANT

## 2022-03-31 RX ORDER — BUPROPION HYDROCHLORIDE 150 MG/1
150 TABLET ORAL DAILY
COMMUNITY
End: 2022-09-01 | Stop reason: SDUPTHER

## 2022-03-31 RX ORDER — LIDOCAINE HYDROCHLORIDE 10 MG/ML
3 INJECTION, SOLUTION EPIDURAL; INFILTRATION; INTRACAUDAL; PERINEURAL
Status: COMPLETED | OUTPATIENT
Start: 2022-03-31 | End: 2022-03-31

## 2022-03-31 RX ADMIN — LIDOCAINE HYDROCHLORIDE 3 ML: 10 INJECTION, SOLUTION EPIDURAL; INFILTRATION; INTRACAUDAL; PERINEURAL at 09:06

## 2022-03-31 NOTE — PATIENT INSTRUCTIONS
Knee Injection  A knee injection is a procedure to get medicine into your knee joint to relieve the pain, swelling, and stiffness of arthritis. Your health care provider uses a needle to inject medicine, which may also help to lubricate and cushion your knee joint. You may need more than one injection.  Tell a health care provider about:  Any allergies you have.  All medicines you are taking, including vitamins, herbs, eye drops, creams, and over-the-counter medicines.  Any problems you or family members have had with anesthetic medicines.  Any blood disorders you have.  Any surgeries you have had.  Any medical conditions you have.  Whether you are pregnant or may be pregnant.  What are the risks?  Generally, this is a safe procedure. However, problems may occur, including:  Infection.  Bleeding.  Symptoms that get worse.  Damage to the area around your knee.  Allergic reaction to any of the medicines.  Skin reactions from repeated injections.  What happens before the procedure?  Ask your health care provider about:  Changing or stopping your regular medicines. This is especially important if you are taking diabetes medicines or blood thinners.  Taking medicines such as aspirin and ibuprofen. These medicines can thin your blood. Do not take these medicines unless your health care provider tells you to take them.  Taking over-the-counter medicines, vitamins, herbs, and supplements.  Plan to have a responsible adult take you home from the hospital or clinic.  What happens during the procedure?    You will sit or lie down in a position for your knee to be treated.  The skin over your kneecap will be cleaned with a germ-killing soap.  You will be given a medicine that numbs the area (local anesthetic). You may feel some stinging.  The medicine will be injected into your knee. The needle is carefully placed between your kneecap and your knee. The medicine is injected into the joint space.  The needle will be removed at  the end of the procedure.  A bandage (dressing) may be placed over the injection site.  The procedure may vary among health care providers and hospitals.  What can I expect after the procedure?  Your blood pressure, heart rate, breathing rate, and blood oxygen level will be monitored until you leave the hospital or clinic.  You may have to move your knee through its full range of motion. This helps to get all the medicine into your joint space.  You will be watched to make sure that you do not have a reaction to the injected medicine.  You may feel more pain, swelling, and warmth than you did before the injection. This reaction may last about 1-2 days.  Follow these instructions at home:  Medicines  Take over-the-counter and prescription medicines only as told by your health care provider.  Ask your health care provider if the medicine prescribed to you requires you to avoid driving or using machinery.  Do not take medicines such as aspirin and ibuprofen unless your health care provider tells you to take them.  Injection site care  Follow instructions from your health care provider about:  How to take care of your puncture site.  When and how you should change your dressing.  When you should remove your dressing.  Check your injection area every day for signs of infection. Check for:  More redness, swelling, or pain after 2 days.  Fluid or blood.  Pus or a bad smell.  Warmth.  Managing pain, stiffness, and swelling    If directed, put ice on the injection area. To do this:  Put ice in a plastic bag.  Place a towel between your skin and the bag.  Leave the ice on for 20 minutes, 2-3 times per day.  Remove the ice if your skin turns bright red. This is very important. If you cannot feel pain, heat, or cold, you have a greater risk of damage to the area.  Do not apply heat to your knee.  Raise (elevate) the injection area above the level of your heart while you are sitting or lying down.    General instructions  If you  were given a dressing, keep it dry until your health care provider says it can be removed. Ask your health care provider when you can start showering or bathing.  Avoid strenuous activities for as long as directed by your health care provider. Ask your health care provider when you can return to your normal activities.  Keep all follow-up visits. This is important. You may need more injections.  Contact a health care provider if you have:  A fever.  Warmth in your injection area.  Fluid, blood, or pus coming from your injection site.  Symptoms at your injection site that last longer than 2 days after your procedure.  Get help right away if:  Your knee turns very red.  Your knee becomes very swollen.  Your knee is in severe pain.  Summary  A knee injection is a procedure to get medicine into your knee joint to relieve the pain, swelling, and stiffness of arthritis.  A needle is carefully placed between your kneecap and your knee to inject medicine into the joint space.  Before the procedure, ask your health care provider about changing or stopping your regular medicines, especially if you are taking diabetes medicines or blood thinners.  Contact your health care provider if you have any problems or questions after your procedure.  This information is not intended to replace advice given to you by your health care provider. Make sure you discuss any questions you have with your health care provider.  Document Revised: 06/02/2021 Document Reviewed: 06/02/2021  Elsevier Patient Education © 2021 [employer]

## 2022-03-31 NOTE — PROGRESS NOTES
ORTHO FOLLOW UP       Subjective:    HPI:   Aida Peralta is a 41 y.o. female who presents in follow-up for bilateral knee pain with a known history of bilateral knee DJD.  She last had bilateral intra-articular steroid injections on 2/1/2022, with a positive but inadequate response.  She reports that the diclofenac is working well.  This is her first time trying Visco supplementation.      Past Medical History:   Diagnosis Date   • Hypertension    • Migraines    • Plica of knee, left 10/22/2021   • Primary osteoarthritis of both knees 10/22/2021       Past Surgical History:   Procedure Laterality Date   • CHOLECYSTECTOMY     • TUBAL ABDOMINAL LIGATION         Social History     Occupational History   • Not on file   Tobacco Use   • Smoking status: Never Smoker   • Smokeless tobacco: Never Used   Vaping Use   • Vaping Use: Never used   Substance and Sexual Activity   • Alcohol use: Yes     Comment: socially   • Drug use: Never   • Sexual activity: Defer      The following portions of the patient's history were reviewed and updated as appropriate: allergies, current medications, past family history, past medical history, past social history, past surgical history and problem list.    Medications:    Current Outpatient Medications:   •  acetaminophen-codeine (TYLENOL/CODEINE #3) 300-30 MG per tablet, Take 1-2 tablets by mouth Every 6 (Six) Hours As Needed for Moderate Pain ., Disp: 15 tablet, Rfl: 0  •  aspirin-acetaminophen-caffeine (EXCEDRIN MIGRAINE) 250-250-65 MG per tablet, Take 1 tablet by mouth., Disp: , Rfl:   •  buPROPion XL (WELLBUTRIN XL) 150 MG 24 hr tablet, Take 150 mg by mouth Daily., Disp: , Rfl:   •  Cholecalciferol (Vitamin D) 50 MCG (2000 UT) capsule, Take  by mouth., Disp: , Rfl:   •  diclofenac (VOLTAREN) 75 MG EC tablet, Take 1 tablet by mouth 2 (Two) Times a Day. Prn joint pain, Disp: 60 tablet, Rfl: 2  •  triamterene-hydrochlorothiazide (DYAZIDE) 37.5-25 MG per capsule, Take 1 capsule by  "mouth Daily., Disp: , Rfl:     Allergies:  Allergies   Allergen Reactions   • Lisinopril Itching       Review of Systems:  Gen -no fever, chills , sweats, headache   Eyes - no irritation or discharge   ENT -  no ear pain , runny nose , sore throat , difficulty swallowing   Resp - no cough , congestion , excessive expectoration   CVS - no chest pain , palpitations.   Abd - no pain , nausea , vomiting , diarrhea   Skin - no rash , lesions.   Neuro - no dizziness    Please see HPI for any other pertinent positives.  All other systems were reviewed and are negative.       Objective   Objective:    BP (!) 148/103   Pulse 83   Ht 157.5 cm (62\")   Wt 91.5 kg (201 lb 12.8 oz)   SpO2 97%   BMI 36.91 kg/m²     Physical Examination:  Alert, oriented, obese individual in no acute distress, ambulating unassisted  Right lower extremity shows no erythema, rashes, or open skin lesions. There is a minimal amount of swelling. It is grossly well aligned, and the patient is neurovascularly intact distally. The knee is stable to varus and valgus stress, there is no patellar maltracking or crepitus noted, and plantar and dorsiflexion is 5/5. There is mild tenderness to palpation over the medial joint line and with range of motion, which is about 0-130.   Left lower extremity shows no erythema, rashes, or open skin lesions. There is a minimal amount of swelling. It is grossly well aligned, and the patient is neurovascularly intact distally. The knee is stable to varus and valgus stress, there is no patellar maltracking or crepitus noted, and plantar and dorsiflexion is 5/5.  Lateral plica palpable with tenderness.  There is mild tenderness to palpation over the medial joint line and with range of motion, which is about 0-120.         Imaging:  no diagnostic testing performed this visit            Assessment:  1. Primary osteoarthritis of both knees    2. Plica of knee, left    3. Morbid obesity (HCC)                 Plan:  Bilateral " "Monovisc injections today, risks and benefits were discussed and postinjection instructions were given.  Continue weight loss efforts.  Continue anti-inflammatories as needed.  Follow-up as needed.  - Large Joint Arthrocentesis: bilateral knee on 3/31/2022 9:06 AM  Indications: pain  Details: 22 G needle, anterolateral approach  Medications (Right): 88 mg Hyaluronan 88 MG/4ML; 3 mL lidocaine PF 1% 1 %  Medications (Left): 88 mg Hyaluronan 88 MG/4ML; 3 mL lidocaine PF 1% 1 %  Outcome: tolerated well, no immediate complications  Procedure, treatment alternatives, risks and benefits explained, specific risks discussed. Consent was given by the patient. Immediately prior to procedure a time out was called to verify the correct patient, procedure, equipment, support staff and site/side marked as required. Patient was prepped and draped in the usual sterile fashion.       After consent was obtained and a time out was properly performed, the right knee was prepped with alcohol and chlorhexidine. A 25 gauge needle was used to inject 3 cc of 1% lidocaine, following this a 22 gauge needle was used to inject one syringe of Monovisc. Sterile technique was used and the patient tolerated the procedure well.  After consent was obtained and a time out was properly performed, the left knee was prepped with alcohol and chlorhexidine. A 25 gauge needle was used to inject 3 cc of 1% lidocaine, following this a 22 gauge needle was used to inject one syringe of Monovisc. Sterile technique was used and the patient tolerated the procedure well.          JAVIER Abad  03/31/22  09:05 EDT    \"Please note that portions of this note were completed with a voice recognition program\".   "

## 2022-06-18 ENCOUNTER — HOSPITAL ENCOUNTER (EMERGENCY)
Facility: HOSPITAL | Age: 42
Discharge: HOME | End: 2022-06-18
Attending: EMERGENCY MEDICINE | Admitting: EMERGENCY MEDICINE

## 2022-06-18 ENCOUNTER — APPOINTMENT (OUTPATIENT)
Dept: CT IMAGING | Facility: HOSPITAL | Age: 42
End: 2022-06-18

## 2022-06-18 VITALS
TEMPERATURE: 97.3 F | HEIGHT: 60 IN | SYSTOLIC BLOOD PRESSURE: 124 MMHG | RESPIRATION RATE: 18 BRPM | OXYGEN SATURATION: 97 % | WEIGHT: 200 LBS | BODY MASS INDEX: 39.27 KG/M2 | HEART RATE: 88 BPM | DIASTOLIC BLOOD PRESSURE: 82 MMHG

## 2022-06-18 DIAGNOSIS — R51.9 NONINTRACTABLE HEADACHE, UNSPECIFIED CHRONICITY PATTERN, UNSPECIFIED HEADACHE TYPE: Primary | ICD-10-CM

## 2022-06-18 LAB
ANION GAP SERPL CALCULATED.3IONS-SCNC: 11.4 MMOL/L (ref 5–15)
BASOPHILS # BLD AUTO: 0.05 10*3/MM3 (ref 0–0.2)
BASOPHILS NFR BLD AUTO: 0.5 % (ref 0–1.5)
BUN SERPL-MCNC: 14 MG/DL (ref 6–20)
BUN/CREAT SERPL: 13.5 (ref 7–25)
CALCIUM SPEC-SCNC: 10 MG/DL (ref 8.6–10.5)
CHLORIDE SERPL-SCNC: 101 MMOL/L (ref 98–107)
CO2 SERPL-SCNC: 24.6 MMOL/L (ref 22–29)
CREAT SERPL-MCNC: 1.04 MG/DL (ref 0.57–1)
DEPRECATED RDW RBC AUTO: 43.2 FL (ref 37–54)
EGFRCR SERPLBLD CKD-EPI 2021: 70.3 ML/MIN/1.73
EOSINOPHIL # BLD AUTO: 0.1 10*3/MM3 (ref 0–0.4)
EOSINOPHIL NFR BLD AUTO: 1 % (ref 0.3–6.2)
ERYTHROCYTE [DISTWIDTH] IN BLOOD BY AUTOMATED COUNT: 13.7 % (ref 12.3–15.4)
GLUCOSE SERPL-MCNC: 162 MG/DL (ref 65–99)
HCT VFR BLD AUTO: 38.4 % (ref 34–46.6)
HGB BLD-MCNC: 13.4 G/DL (ref 12–15.9)
IMM GRANULOCYTES # BLD AUTO: 0.03 10*3/MM3 (ref 0–0.05)
IMM GRANULOCYTES NFR BLD AUTO: 0.3 % (ref 0–0.5)
LYMPHOCYTES # BLD AUTO: 1.53 10*3/MM3 (ref 0.7–3.1)
LYMPHOCYTES NFR BLD AUTO: 14.9 % (ref 19.6–45.3)
MCH RBC QN AUTO: 30.7 PG (ref 26.6–33)
MCHC RBC AUTO-ENTMCNC: 34.9 G/DL (ref 31.5–35.7)
MCV RBC AUTO: 87.9 FL (ref 79–97)
MONOCYTES # BLD AUTO: 0.63 10*3/MM3 (ref 0.1–0.9)
MONOCYTES NFR BLD AUTO: 6.1 % (ref 5–12)
NEUTROPHILS NFR BLD AUTO: 7.96 10*3/MM3 (ref 1.7–7)
NEUTROPHILS NFR BLD AUTO: 77.2 % (ref 42.7–76)
NRBC BLD AUTO-RTO: 0 /100 WBC (ref 0–0.2)
PLATELET # BLD AUTO: 336 10*3/MM3 (ref 140–450)
PMV BLD AUTO: 11 FL (ref 6–12)
POTASSIUM SERPL-SCNC: 3.6 MMOL/L (ref 3.5–5.2)
RBC # BLD AUTO: 4.37 10*6/MM3 (ref 3.77–5.28)
SODIUM SERPL-SCNC: 137 MMOL/L (ref 136–145)
WBC NRBC COR # BLD: 10.3 10*3/MM3 (ref 3.4–10.8)

## 2022-06-18 PROCEDURE — 96375 TX/PRO/DX INJ NEW DRUG ADDON: CPT

## 2022-06-18 PROCEDURE — 70450 CT HEAD/BRAIN W/O DYE: CPT

## 2022-06-18 PROCEDURE — 99282 EMERGENCY DEPT VISIT SF MDM: CPT | Performed by: EMERGENCY MEDICINE

## 2022-06-18 PROCEDURE — 85025 COMPLETE CBC W/AUTO DIFF WBC: CPT | Performed by: EMERGENCY MEDICINE

## 2022-06-18 PROCEDURE — 96374 THER/PROPH/DIAG INJ IV PUSH: CPT

## 2022-06-18 PROCEDURE — 99283 EMERGENCY DEPT VISIT LOW MDM: CPT

## 2022-06-18 PROCEDURE — 25010000002 METOCLOPRAMIDE PER 10 MG: Performed by: EMERGENCY MEDICINE

## 2022-06-18 PROCEDURE — 80048 BASIC METABOLIC PNL TOTAL CA: CPT | Performed by: EMERGENCY MEDICINE

## 2022-06-18 PROCEDURE — 25010000002 DIPHENHYDRAMINE PER 50 MG: Performed by: EMERGENCY MEDICINE

## 2022-06-18 RX ORDER — SODIUM CHLORIDE 0.9 % (FLUSH) 0.9 %
10 SYRINGE (ML) INJECTION AS NEEDED
Status: DISCONTINUED | OUTPATIENT
Start: 2022-06-18 | End: 2022-06-18 | Stop reason: HOSPADM

## 2022-06-18 RX ORDER — ACETAMINOPHEN 500 MG
1000 TABLET ORAL ONCE
Status: COMPLETED | OUTPATIENT
Start: 2022-06-18 | End: 2022-06-18

## 2022-06-18 RX ORDER — DIPHENHYDRAMINE HYDROCHLORIDE 50 MG/ML
25 INJECTION INTRAMUSCULAR; INTRAVENOUS ONCE
Status: COMPLETED | OUTPATIENT
Start: 2022-06-18 | End: 2022-06-18

## 2022-06-18 RX ORDER — METOCLOPRAMIDE HYDROCHLORIDE 5 MG/ML
10 INJECTION INTRAMUSCULAR; INTRAVENOUS ONCE
Status: COMPLETED | OUTPATIENT
Start: 2022-06-18 | End: 2022-06-18

## 2022-06-18 RX ADMIN — DIPHENHYDRAMINE HYDROCHLORIDE 25 MG: 50 INJECTION, SOLUTION INTRAMUSCULAR; INTRAVENOUS at 17:50

## 2022-06-18 RX ADMIN — ACETAMINOPHEN 1000 MG: 500 TABLET ORAL at 17:55

## 2022-06-18 RX ADMIN — METOCLOPRAMIDE 10 MG: 5 INJECTION, SOLUTION INTRAMUSCULAR; INTRAVENOUS at 17:55

## 2022-06-18 RX ADMIN — SODIUM CHLORIDE, POTASSIUM CHLORIDE, SODIUM LACTATE AND CALCIUM CHLORIDE 1000 ML: 600; 310; 30; 20 INJECTION, SOLUTION INTRAVENOUS at 17:47

## 2022-06-18 NOTE — DISCHARGE INSTRUCTIONS
Please follow-up with your primary care physician.  Please return to emergency room if you develop chest pain, difficulty breathing or for any other concerns.

## 2022-06-18 NOTE — ED PROVIDER NOTES
Subjective   History of Present Illness  41-year-old female presents emergency room complaining of a headache.  She says started in the frontal region but spread to the head over the last week.  She says that is pretty much global at this point with a throbbing sensation.  She has some photophobia and mild nausea but no vomiting.  No vision changes.  She says she does not have a history of headaches but she does have migraines listed in her chart she says that is a mistake.  She has not had fevers chills or any other current illnesses.  She reports that historically every now and then she has some weakness in the legs that lasts a couple of minutes.  No other neuro complaints.  Review of Systems   All other systems reviewed and are negative.      Past Medical History:   Diagnosis Date   • Hypertension    • Migraines    • Plica of knee, left 10/22/2021   • Primary osteoarthritis of both knees 10/22/2021       Allergies   Allergen Reactions   • Lisinopril Itching       Past Surgical History:   Procedure Laterality Date   • CHOLECYSTECTOMY     • TUBAL ABDOMINAL LIGATION         History reviewed. No pertinent family history.    Social History     Socioeconomic History   • Marital status:    Tobacco Use   • Smoking status: Never Smoker   • Smokeless tobacco: Never Used   Vaping Use   • Vaping Use: Never used   Substance and Sexual Activity   • Alcohol use: Yes     Comment: socially   • Drug use: Never   • Sexual activity: Defer           Objective    ED Triage Vitals [06/18/22 1700]   Temp Heart Rate Resp BP SpO2   97.3 °F (36.3 °C) 100 18 (!) 167/108 97 %      Temp src Heart Rate Source Patient Position BP Location FiO2 (%)   Temporal -- Sitting Right arm --       Physical Exam  INITIAL VITAL SIGNS: Reviewed by me.  Pulse ox normal  GENERAL: Alert and interactive. No acute distress.  HEAD: Head is normocephalic.  Tender to palpation of the head as well as superior neck musculature at the insertion in the  skull  EYES: EOMI. PERRL. No scleral icterus. No conjunctival injection.  ENT: Moist mucous membranes.   NECK: Supple. Full range of motion.  BACK:  No obvious deformity.  EXTREMITIES: No deformity. No clubbing or cyanosis. No edema.   SKIN: Warm and dry. No diaphoresis. No obvious rashes.   NEUROLOGIC: Alert and oriented. Face is symmetric. Speech is normal. Moves all extremities equally. Motor and sensory distally intact.     CT Head Without Contrast    Result Date: 6/18/2022  PROCEDURE: CT Head WO COMPARISON: No relevant comparison or correlation studies available at time of dictation.  INDICATIONS: ha x 1 week, worsening Radiation dose reduction techniques included automated exposure control or exposure modulation based on body size.  Count of known CT and cardiac nuc med studies performed in previous 12 months: 0.  FINDINGS:  CT examination of the brain is performed without IV contrast. There is no evidence of acute intracranial hemorrhage, mass effect or midline shift. No intra-axial or extra-axial fluid collections. The ventricular system is normal.  The calvarium is intact.     No acute intracranial process.    Signer Name: Pari Guy MD  Signed: 6/18/2022 6:44 PM  Workstation Name: [facility]  [facility]      Procedures           ED Course                                                 MDM  Patient with global headache, she has tenderness to the musculature superior neck at the insertion of the skull no other neck tenderness, she does have some tenderness to palpation of the head.  Patient feeling much better after headache cocktail, CT is negative for acute process I have no concerns for bleeding or infection as cause of her headache will discharge home.  Final diagnoses:   Nonintractable headache, unspecified chronicity pattern, unspecified headache type       ED Disposition  ED Disposition     ED Disposition   Discharge    Condition   Good    Comment   --              Aida Sheldon, APRN  [address] [address]  [phone]    Call   To schedule follow-up appointment         Medication List      No changes were made to your prescriptions during this visit.          Pete Parks MD  06/18/22 1234

## 2022-09-01 ENCOUNTER — OFFICE VISIT (OUTPATIENT)
Dept: ORTHOPEDIC SURGERY | Facility: CLINIC | Age: 42
End: 2022-09-01

## 2022-09-01 VITALS — HEIGHT: 60 IN | HEART RATE: 80 BPM | BODY MASS INDEX: 37.73 KG/M2 | WEIGHT: 192.2 LBS

## 2022-09-01 DIAGNOSIS — E66.01 MORBID OBESITY: ICD-10-CM

## 2022-09-01 DIAGNOSIS — M17.0 PRIMARY OSTEOARTHRITIS OF BOTH KNEES: Primary | ICD-10-CM

## 2022-09-01 DIAGNOSIS — M67.52 PLICA OF KNEE, LEFT: ICD-10-CM

## 2022-09-01 PROBLEM — F43.20 ADJUSTMENT DISORDER: Status: ACTIVE | Noted: 2022-09-01

## 2022-09-01 PROCEDURE — 99214 OFFICE O/P EST MOD 30 MIN: CPT | Performed by: PHYSICIAN ASSISTANT

## 2022-09-01 RX ORDER — BUPROPION HYDROCHLORIDE 300 MG/1
1 TABLET ORAL DAILY
COMMUNITY
Start: 2022-08-31

## 2022-09-01 RX ORDER — PYRAZINAMIDE 500 MG/1
1-2 TABLET ORAL EVERY 6 HOURS PRN
Qty: 20 TABLET | Refills: 0 | Status: SHIPPED | OUTPATIENT
Start: 2022-09-01

## 2022-09-01 RX ORDER — DICLOFENAC SODIUM 75 MG/1
75 TABLET, DELAYED RELEASE ORAL 2 TIMES DAILY
Qty: 60 TABLET | Refills: 11 | Status: SHIPPED | OUTPATIENT
Start: 2022-09-01 | End: 2023-03-03

## 2022-09-01 RX ORDER — DIAZEPAM 5 MG/1
TABLET ORAL
Qty: 2 TABLET | Refills: 0 | Status: SHIPPED | OUTPATIENT
Start: 2022-09-01 | End: 2023-03-03

## 2022-09-01 NOTE — PROGRESS NOTES
ORTHO FOLLOW UP       Subjective:    HPI:   Aida Peralta is a 41 y.o. female who presents in follow-up for her bilateral knee pain with a known history of bilateral knee DJD.  She reports no significant change in her type of pain, but does report continued pain, as well as a recent increase in right knee pain.  She does report her right knee pain is greater than the left.  She has failed both intra-articular steroid and viscosupplementation injections.  She is using diclofenac, which does help some with her discomfort.  She is also using bracing.      Past Medical History:   Diagnosis Date   • Hypertension    • Migraines    • Plica of knee, left 10/22/2021   • Primary osteoarthritis of both knees 10/22/2021       Past Surgical History:   Procedure Laterality Date   • CHOLECYSTECTOMY     • TUBAL ABDOMINAL LIGATION         Social History     Occupational History   • Not on file   Tobacco Use   • Smoking status: Never Smoker   • Smokeless tobacco: Never Used   Vaping Use   • Vaping Use: Never used   Substance and Sexual Activity   • Alcohol use: Yes     Comment: socially   • Drug use: Never   • Sexual activity: Defer      The following portions of the patient's history were reviewed and updated as appropriate: allergies, current medications, past family history, past medical history, past social history, past surgical history and problem list.    Medications:    Current Outpatient Medications:   •  buPROPion XL (WELLBUTRIN XL) 300 MG 24 hr tablet, 1 tablet Daily., Disp: , Rfl:   •  triamterene-hydrochlorothiazide (DYAZIDE) 37.5-25 MG per capsule, Take 1 capsule by mouth Daily., Disp: , Rfl:   •  acetaminophen-codeine (TYLENOL/CODEINE #3) 300-30 MG per tablet, Take 1-2 tablets by mouth Every 6 (Six) Hours As Needed for Moderate Pain., Disp: 20 tablet, Rfl: 0  •  diazePAM (Valium) 5 MG tablet, Take 1 tab 45 minutes prior to procedure, may take additional tab 30 minutes later if necessary, Disp: 2 tablet, Rfl: 0  •   "diclofenac (VOLTAREN) 75 MG EC tablet, Take 1 tablet by mouth 2 (Two) Times a Day. Prn joint pain, Disp: 60 tablet, Rfl: 11    Allergies:  Allergies   Allergen Reactions   • Lisinopril Itching          Objective   Objective:    Pulse 80   Ht 152.4 cm (60\")   Wt 87.2 kg (192 lb 3.2 oz)   BMI 37.54 kg/m²     Physical Examination:  Alert, oriented, obese individual in no acute distress, ambulating unassisted  Right lower extremity shows no erythema, rashes, or open skin lesions. There is a minimal amount of swelling. It is grossly well aligned, and the patient is neurovascularly intact distally. The knee is stable to varus and valgus stress, there is no patellar maltracking or crepitus noted, and plantar and dorsiflexion is 5/5. There is mild tenderness to palpation over the patellar tendon and lateral joint line and with range of motion, which is about 0-130.  Positive Olena's.  Left lower extremity shows no erythema, rashes, or open skin lesions. There is a minimal amount of swelling. It is grossly well aligned, and the patient is neurovascularly intact distally. The knee is stable to varus and valgus stress, there is no patellar maltracking or crepitus noted, and plantar and dorsiflexion is 5/5. There is mild tenderness to palpation over the medial joint line and with range of motion, which is about 0-130.  Negative Olena's.           Imaging:  xrays obtained today  bilateral Knee X-Ray    Date of exam: 9/1/2022    Indication: Bilateral knee pain    AP, Lateral, [place] [address] views    Findings:Shows right knee mild tricompartmental DJD, worse in the medial compartment, that has progressed slightly when compared to previous imaging.  Shows left knee mild to moderate tricompartmental DJD, worse in the medial compartment and No fractures or dislocations are appreciated.    decreased joint spaces    Hardware appropriately positioned not applicable    yes prior studies available for comparison.    This patient's x-ray " "report was graded according to the Kellgren and Rangel classification.  This took into account the joint space narrowing, osteophyte formation, sclerosis of the distal femur/proximal tibia along with deformity of those bones.  The findings were indicative of K L grade 2-3.    X-RAY was ordered and reviewed by JAVIER Abad            Assessment:  1. Primary osteoarthritis of both knees    2. Plica of knee, left    3. Morbid obesity (HCC)                 Plan:  I am recommending an MRI of the right knee at this time for further investigation, as I do suspect a meniscus tear.  Continue weight loss efforts.  Continue bracing.  I will refill her diclofenac at this time.  I will also give her a small amount of Tylenol 3 to use sparingly for severe pain, risks and addiction properties were discussed.  She may need to be referred to discuss her surgical options, however she is very young.  We will plan to see her back after the above has been completed, further recommendations will be pending.  Natural history and expected course discussed. Questions answered.  Educational materials distributed.  Rest, ice, compression, and elevation (RICE) therapy.  Quad strengthening exercises.  NSAIDs per medication orders.  OTC analgesics as needed.  MRI.  cortisone injections  viscosupplementation  physical therapy  bracing  weight loss  activtiy modification               JAVIER Abad  09/01/22  15:02 EDT    \"Please note that portions of this note were completed with a voice recognition program\".   "

## 2023-02-24 ENCOUNTER — TELEPHONE (OUTPATIENT)
Dept: ORTHOPEDIC SURGERY | Facility: CLINIC | Age: 43
End: 2023-02-24

## 2023-02-24 NOTE — TELEPHONE ENCOUNTER
Caller: Aida Peralta    Relationship to patient: Self    Best call back number:     Chief complaint: BILAT KNEE     Type of visit: FUP INJECTION     Additional notes:PATIENT WOULD RATHER SEE A DR GOING FORWARD

## 2023-03-03 ENCOUNTER — OFFICE VISIT (OUTPATIENT)
Dept: ORTHOPEDIC SURGERY | Facility: CLINIC | Age: 43
End: 2023-03-03
Payer: COMMERCIAL

## 2023-03-03 VITALS — BODY MASS INDEX: 37.69 KG/M2 | WEIGHT: 192 LBS | HEIGHT: 60 IN | OXYGEN SATURATION: 96 % | HEART RATE: 96 BPM

## 2023-03-03 DIAGNOSIS — M17.12 PRIMARY OSTEOARTHRITIS OF LEFT KNEE: Primary | ICD-10-CM

## 2023-03-03 PROCEDURE — 99214 OFFICE O/P EST MOD 30 MIN: CPT | Performed by: FAMILY MEDICINE

## 2023-03-03 RX ORDER — PANTOPRAZOLE SODIUM 40 MG/1
TABLET, DELAYED RELEASE ORAL
COMMUNITY
Start: 2023-01-24

## 2023-03-03 NOTE — PROGRESS NOTES
Primary Care Sports Medicine Office Visit Note     Patient ID: Aida Peralta is a 42 y.o. female.    Chief Complaint:  Chief Complaint   Patient presents with   • Left Knee - Pain   • Right Knee - Pain     HPI:    Ms. Aida ePralta is a 42 y.o. female. The patient presents to the clinic today for repeat evaluation of bilateral knee pain. She is a new patient to me who has previously been seen by our physician assistant Padma Youssef.    The patient states that she would like her knee replaced. She reports that the last doctor she saw told her that she needed a partial knee replacement, but he did not do them, so he referred her to someone that he knew. She states that he does not remember his name, but he did not think that she was a candidate for partial knee replacement. She reports that she has had steroid injections, gel injections, and platelet rich plasma treatments. She states that physical therapy did not help. She reports that she has tried 6 different braces. She reports that she is no longer taking anti-inflammatories because they gave her ulcers, and she is now on ulcer medication. She reports that her right knee is worse than her left knee. She states that her left knee has been bothering her in the last month and a half, but it is still way better than her right knee. She reports that the pain is mostly on the side and in the front. The patient denies any urinary or bowel problems. The patient denies any fevers.     Past Medical History:   Diagnosis Date   • Hypertension    • Migraines    • Plica of knee, left 10/22/2021   • Primary osteoarthritis of both knees 10/22/2021       Past Surgical History:   Procedure Laterality Date   • CHOLECYSTECTOMY     • TUBAL ABDOMINAL LIGATION         History reviewed. No pertinent family history.  Social History     Occupational History   • Not on file   Tobacco Use   • Smoking status: Never   • Smokeless tobacco: Never   Vaping Use   • Vaping Use: Never used  "  Substance and Sexual Activity   • Alcohol use: Yes     Comment: socially   • Drug use: Never   • Sexual activity: Defer      Review of Systems   Constitutional: Negative for activity change and fever.   Musculoskeletal: Positive for arthralgias.   Skin: Negative for color change and rash.   Neurological: Negative for weakness.     Objective:    Pulse 96   Ht 152.4 cm (60\")   Wt 87.1 kg (192 lb)   SpO2 96%   BMI 37.50 kg/m²     Physical Examination:  Physical Exam  Vitals and nursing note reviewed.   Constitutional:       General: She is not in acute distress.     Appearance: She is well-developed. She is not diaphoretic.   HENT:      Head: Normocephalic and atraumatic.   Eyes:      Conjunctiva/sclera: Conjunctivae normal.   Pulmonary:      Effort: Pulmonary effort is normal. No respiratory distress.   Musculoskeletal:      Right knee: No effusion.      Instability Tests: Medial Olena test negative and lateral Olena test negative.      Left knee: No effusion.      Instability Tests: Medial Olena test negative and lateral Olena test negative.   Skin:     General: Skin is warm.      Capillary Refill: Capillary refill takes less than 2 seconds.   Neurological:      Mental Status: She is alert.       Right Ankle Exam     Range of Motion   The patient has normal right ankle ROM.      Left Ankle Exam     Range of Motion   The patient has normal left ankle ROM.       Right Knee Exam     Muscle Strength   The patient has normal right knee strength.    Tenderness   The patient is experiencing tenderness in the lateral joint line, medial joint line and patella.    Range of Motion   Extension: normal   Flexion: normal     Tests   Olena:  Medial - negative Lateral - negative  Varus: negative Valgus: negative  Right knee patellar apprehension test: +patellar grind, +reanna nicole.    Other   Erythema: absent  Sensation: normal  Pulse: present (distal to the knee, DP and PT palpable)  Swelling: none  Effusion: " no effusion present      Left Knee Exam     Muscle Strength   The patient has normal left knee strength.    Tenderness   The patient is experiencing tenderness in the lateral joint line, medial joint line and patella.    Range of Motion   Extension: normal   Flexion: normal     Tests   Olena:  Medial - negative Lateral - negative  Varus: negative Valgus: negative  Left knee patellar apprehension test: +patellar grind testing, +reanna nicole testing.    Other   Erythema: absent  Sensation: normal  Pulse: present (distal to the knee, DP and PT palpable)  Swelling: none  Effusion: no effusion present           Imaging and other tests:    Magnetic resonance imaging of the right knee without contrast dated 12/20/2022:  IMPRESSION:  1. Tricompartmental osteoarthritis most pronounced within the medial compartment, with full thickness cartilage loss present. Reactive edema seen within the medial femoral condyle and medial tibial plateau. Both.  2. Degeneration of the medial meniscus. Small oblique tear of the body with extrusion.  3. Small joint effusion and small partially ruptured Baker's cyst.    Assessment and Plan:    1. Primary osteoarthritis of bilateral knees    I discussed pathology and treatment options with the patient today. The fact that she has now had corticosteroid, hyaluronic acid, platelet rich plasma, and any and all other conservative measures, I feel that further evaluation for potentially even surgical intervention is warranted. We will get a technetium bone scan, which was ordered today. I would like to have her come in and potentially discuss further surgical treatment options at that time.    Transcribed from ambient dictation for Timoteo Roman II,  by Hui Daniel.  03/03/23   12:34 EST    Patient or patient representative verbalized consent to the visit recording.  I have personally performed the services described in this document as transcribed by the above individual, and it is both  accurate and complete.    Disclaimer: Please note that areas of this note were completed with computer voice recognition software.  Quite often unanticipated grammatical, syntax, homophones, and other interpretive errors are inadvertently transcribed by the computer software. Please excuse any errors that have escaped final proofreading.

## 2023-03-13 ENCOUNTER — HOSPITAL ENCOUNTER (OUTPATIENT)
Dept: NUCLEAR MEDICINE | Facility: HOSPITAL | Age: 43
Discharge: HOME | End: 2023-03-13
Payer: COMMERCIAL

## 2023-03-13 DIAGNOSIS — M17.12 PRIMARY OSTEOARTHRITIS OF LEFT KNEE: ICD-10-CM

## 2023-03-13 PROCEDURE — 0 TECHNETIUM MEDRONATE KIT: Performed by: FAMILY MEDICINE

## 2023-03-13 PROCEDURE — A9503 TC99M MEDRONATE: HCPCS | Performed by: FAMILY MEDICINE

## 2023-03-13 PROCEDURE — 78315 BONE IMAGING 3 PHASE: CPT

## 2023-03-13 RX ORDER — TC 99M MEDRONATE 20 MG/10ML
26.5 INJECTION, POWDER, LYOPHILIZED, FOR SOLUTION INTRAVENOUS
Status: COMPLETED | OUTPATIENT
Start: 2023-03-13 | End: 2023-03-13

## 2023-03-13 RX ADMIN — TC 99M MEDRONATE 26.5 MILLICURIE: 20 INJECTION, POWDER, LYOPHILIZED, FOR SOLUTION INTRAVENOUS at 08:48

## 2023-03-15 ENCOUNTER — TELEPHONE (OUTPATIENT)
Dept: ORTHOPEDIC SURGERY | Facility: CLINIC | Age: 43
End: 2023-03-15

## 2023-03-15 NOTE — TELEPHONE ENCOUNTER
----- Message from Timoteo Roman II, DO sent at 3/14/2023  6:26 PM EDT -----  Please have patient schedule follow-up visit to discuss test results    CD  ----- Message -----  From: [facility] [place]  Sent: 3/13/2023   2:22 PM EDT  To: Timoteo Roman II, DO

## 2023-03-17 ENCOUNTER — OFFICE VISIT (OUTPATIENT)
Dept: ORTHOPEDIC SURGERY | Facility: CLINIC | Age: 43
End: 2023-03-17
Payer: COMMERCIAL

## 2023-03-17 VITALS — HEART RATE: 92 BPM | OXYGEN SATURATION: 98 % | HEIGHT: 60 IN | BODY MASS INDEX: 37.69 KG/M2 | WEIGHT: 192 LBS

## 2023-03-17 DIAGNOSIS — M17.0 PRIMARY OSTEOARTHRITIS OF BOTH KNEES: Primary | ICD-10-CM

## 2023-03-17 PROCEDURE — 99213 OFFICE O/P EST LOW 20 MIN: CPT | Performed by: FAMILY MEDICINE

## 2023-03-17 NOTE — PROGRESS NOTES
"Primary Care Sports Medicine Office Visit Note     Patient ID: Aida Peralta is a 42 y.o. female.    Chief Complaint:  Chief Complaint   Patient presents with   • Left Knee - Pain, Follow-up     HPI:    Ms. Aida Peralta is a 42 y.o. female. The patient presents to the clinic today for a bone scan follow-up of the left knee.    The patient states that her left knee hurts, but it is tolerable. She reports that she had a platelet-rich plasma injection in her left knee at the end of X/2022. She states that the gel injections did not help. She reports that her last steroid injection was in 02/2023. She reports that the steroid injection only helped for a day or two.    Past Medical History:   Diagnosis Date   • Hypertension    • Migraines    • Plica of knee, left 10/22/2021   • Primary osteoarthritis of both knees 10/22/2021       Past Surgical History:   Procedure Laterality Date   • CHOLECYSTECTOMY     • TUBAL ABDOMINAL LIGATION         History reviewed. No pertinent family history.  Social History     Occupational History   • Not on file   Tobacco Use   • Smoking status: Never   • Smokeless tobacco: Never   Vaping Use   • Vaping Use: Never used   Substance and Sexual Activity   • Alcohol use: Yes     Comment: socially   • Drug use: Never   • Sexual activity: Defer      Review of Systems   Constitutional: Negative for activity change and fever.   Musculoskeletal: Positive for arthralgias.   Skin: Negative for color change and rash.   Neurological: Negative for weakness.     Objective:    Pulse 92   Ht 152.4 cm (60\")   Wt 87.1 kg (192 lb)   SpO2 98%   BMI 37.50 kg/m²     Physical Examination:  Physical Exam  Vitals and nursing note reviewed.   Constitutional:       General: She is not in acute distress.     Appearance: She is well-developed. She is not diaphoretic.   HENT:      Head: Normocephalic and atraumatic.   Eyes:      Conjunctiva/sclera: Conjunctivae normal.   Pulmonary:      Effort: Pulmonary effort is " normal. No respiratory distress.   Musculoskeletal:      Left knee: No effusion.      Instability Tests: Medial Olena test negative and lateral Olena test negative.   Skin:     General: Skin is warm.      Capillary Refill: Capillary refill takes less than 2 seconds.   Neurological:      Mental Status: She is alert.       Left Ankle Exam     Range of Motion   The patient has normal left ankle ROM.       Left Knee Exam     Muscle Strength   The patient has normal left knee strength.    Tenderness   The patient is experiencing tenderness in the lateral joint line, medial joint line and patella.    Range of Motion   Extension: normal   Flexion: normal     Tests   Olena:  Medial - negative Lateral - negative  Varus: negative Valgus: negative  Left knee patellar apprehension test: +patellar grind testing, +reanna nicole testing.    Other   Erythema: absent  Sensation: normal  Pulse: present (distal to the knee, DP and PT palpable)  Swelling: none  Effusion: no effusion present               Imaging and other tests:    Bone scan results are consistent with delayed uptake in the medial and patellofemoral compartment of the left knee.      Assessment and Plan:    1. Primary osteoarthritis of the left knee    I discussed bone scan findings with the patient today, which were done to potentially evaluate for partial knee replacement. These results unfortunately show considerable amount of uptake in the patellofemoral joint. At this point, I will leave further operative consideration to my surgical partner Dr. Pretty. She would like to come back and see him to discuss these results with our surgeon. She was offered corticosteroid injection again today, but denied as she has failed these multiple times, including hyaluronic acid and Platelet-rich plasma as well. Return to clinic first available to Dr. Pretty for further surgical evaluation.    Transcribed from ambient dictation for Timoteo Roman II, DO by Hui   Fredy.  03/17/23   12:25 EDT    Patient or patient representative verbalized consent to the visit recording.  I have personally performed the services described in this document as transcribed by the above individual, and it is both accurate and complete.    Disclaimer: Please note that areas of this note were completed with computer voice recognition software.  Quite often unanticipated grammatical, syntax, homophones, and other interpretive errors are inadvertently transcribed by the computer software. Please excuse any errors that have escaped final proofreading.

## 2023-05-03 ENCOUNTER — OFFICE VISIT (OUTPATIENT)
Dept: ORTHOPEDIC SURGERY | Facility: CLINIC | Age: 43
End: 2023-05-03
Payer: COMMERCIAL

## 2023-05-03 VITALS — WEIGHT: 193.6 LBS | HEIGHT: 62 IN | BODY MASS INDEX: 35.63 KG/M2

## 2023-05-03 DIAGNOSIS — M17.0 PRIMARY OSTEOARTHRITIS OF BOTH KNEES: Primary | ICD-10-CM

## 2023-05-03 DIAGNOSIS — E66.01 MORBID OBESITY: ICD-10-CM

## 2023-05-03 NOTE — PROGRESS NOTES
"Chief Complaint  Consult of the Right Knee    Subjective    History of Present Illness      Aida Peralta is a 42 y.o. female who presents to [facility] for a second opinion on her knee pain.  History of Present Illness this patient has ongoing pain with her knee.  She has been seen by Padma Ambrocio physicians assistant and also by Dr. Mamadou Roman nonoperative orthopedist in the office for the knee pain.  The pain is on the medial aspect of the knee.  The patient states that she is absolutely miserable with her knee pain and pathology.  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  She finds it very difficult to walk on uneven terrain.  She states that she tries to work out but \"I cannot even work out because of my knee pain and pathology.  She would like to pursue an active lifestyle but is unable to go hiking or kayaking because of the medial aspect of knee pain.  She denies any recent history of trauma.  She finds that her knee becomes swollen by the end of the week and she has a lot of pain and discomfort.  Most of the pain is on the medial aspect of the knee with radiation to the proximal tibia.  Pain Location:  RIGHT knee  Radiation: none  Quality: dull, aching  Intensity/Severity: moderate to severe  Duration: 3 years  Progression of symptoms: yes, progressive worsening  Onset quality: gradual   Timing: intermittent  Aggravating Factors: kneeling, squatting  Alleviating Factors: NSAIDs, steroid injection (intra-articular), Visco supplementation injection, rest, brace  Previous Episodes: yes  Associated Symptoms: pain, swelling, clicking/popping, decreased ROM  ADLs Affected: ambulating, work related activities, recreational activities/sports  Previous Treatment: NSAIDs       Objective   Vital Signs:   Ht 157.5 cm (62\")   Wt 87.8 kg (193 lb 9.6 oz)   BMI 35.41 kg/m²     Physical Exam  Physical Exam  Vitals " signs and nursing note reviewed.   Constitutional:       Appearance: Normal appearance.   Pulmonary:      Effort: Pulmonary effort is normal.   Skin:     General: Skin is warm and dry.      Capillary Refill: Capillary refill takes less than 2 seconds.   Neurological:      General: No focal deficit present.      Mental Status: He is alert and oriented to person, place, and time. Mental status is at baseline.   Psychiatric:         Mood and Affect: Mood normal.         Behavior: Behavior normal.         Thought Content: Thought content normal.         Judgment: Judgment normal.     Ortho Exam   Bilateral knee (varus). Patient has crepitus throughout range of motion. Positive patellar grind test. Mild effusion. Lachman is negative. Pivot shift is negative. Anterior and posterior drawer signs are negative. Significant joint line tenderness is noted on the medial aspect of the knee. Patient has a varus orientation of the knee. There is fullness and tenderness in the Popliteal fossa. Mild distention of a Popliteal cyst is noted in this location. Range of motion in flexion is from 0-110 degrees. Neurovascular status is intact.  Dorsalis pedis and posterior tibial artery pulses are palpable. Common peroneal nerve function is well preserved. Patient's gait is cautious and antalgic. Skin and soft tissues are mildly swollen, consistent with synovitis and effusion. The patient has a significant limp with the first few steps after starting the gait cycle. Getting out of a chair takes a lot of effort due to pain on knee flexion.           Result Review :   The following data was reviewed by: Siddhartha Pretty MD on 05/03/2023:      Previously obtained x-rays are reviewed.  There is some symmetric narrowing of the medial joint space.  No fractures are noted.    [facility] reports from [facility] are available in the office today.  There is diffuse cartilage loss in the medial compartment.  There is full-thickness loss of cartilage  with some reactive edema over the medial femoral condyle and the medial tibial plateau.  There are some partial-thickness fissures along the lateral femoral compartment as well as on the medial aspect of the patella.  The ACL and the MCL are both intact.  There is a small partially ruptured Baker's cyst with a small effusion.          Procedures           Assessment   Assessment and Plan    Diagnoses and all orders for this visit:    1. Primary osteoarthritis of both knees (Primary)    2. Morbid obesity          Follow Up   · Compression/brace to prevent the knee from buckling and giving out.  · Risk and benefits of partial knee arthroplasty discussed with the patient to the extent of 45 minutes in the office today.  · Calcium and vitamin D for bone health.  · Rest, ice, compression, and elevation (RICE) therapy  · Stretching and strengthening exercises of the quads and the hamstrings.  · OTC Alternate Ibuprofen and Tylenol as needed  · Follow up in 6 week(s)  The patient was seen today for preoperative discussion.  The patient has been tried on over-the-counter and prescription NSAID's despite the risks of anti-inflammatory bleeding, peptic ulcers and erosive gastritis with short term benefit only.  Braces have been prescribed for mechanical support.  Patient has been participating in an exercise program specifically targeting joint pain relief with limited benefit. Intraarticular injections have been used periodically with some but not complete relief of pain.  Ambulation aids have also been utilized.      · The details of the surgical procedure were explained including the location of probable incisions and a description of the likely hardware/grafts to be used. The patient understands the likely convalescence after surgery as well as the rehabilitation required.  Also, we have thoroughly discussed with the patient the risks, benefits and alternatives to surgery.  Risks include but are not limited to the risk of  infection, joint stiffness, limited range of motion, wound healing problems, scar tissue build up, myocardial infarction, stroke, blood clots (including DVT and/or pulmonary embolus along with the risk of death) neurologic and/or vascular injury, limb length discrepancy, fracture, dislocation, nonunion, malunion, continued pain and need for further surgery including hardware failure requiring revision.   • Patient was given instructions and counseling regarding her condition or for health maintenance advice. Please see specific information pulled into the AVS if appropriate.  • The patient was counseled regarding reduction of BMI including, but not limited to the following: Reduction of portion sizes, reduction of carbohydrate intake, decreased calorie count, increased physical exercise and activity, counseling with a professional such as a registered dietitian and consultation with a bariatric surgeon for possible bariatric procedure.  Should the patient decide to proceed with a bariatric procedure this would not be considered a cosmetic procedure for this patient, but a means to improve the quality of life and to improve the musculoskeletal function.  Patient's current BMI is 35.41 and her body weight is 193 pounds and would need to lose weight which is required for elective orthopedic surgery to minimize perioperative complications.    Siddhartha Pretty MD   Date of Encounter: 5/3/2023   Electronically signed by Siddhartha Pretty MD, 05/03/23, 9:25 AM EDT.     EMR Dragon/Transcription disclaimer:  Much of this encounter note is an electronic transcription/translation of spoken language to printed text. The electronic translation of spoken language may permit erroneous, or at times, nonsensical words or phrases to be inadvertently transcribed; Although I have reviewed the note for such errors, some may still exist.

## 2023-05-16 ENCOUNTER — TELEPHONE (OUTPATIENT)
Dept: ORTHOPEDIC SURGERY | Facility: CLINIC | Age: 43
End: 2023-05-16
Payer: COMMERCIAL

## 2023-05-16 NOTE — TELEPHONE ENCOUNTER
Patient was seen on 5/3/2023 patient is now calling to schedule surgery. Please add case request/orders for patient so we can call and schedule surgery. Thank you

## 2023-05-18 RX ORDER — OXYCODONE HCL 10 MG/1
10 TABLET, FILM COATED, EXTENDED RELEASE ORAL ONCE
OUTPATIENT
Start: 2023-05-18 | End: 2023-05-18

## 2023-05-18 RX ORDER — ACETAMINOPHEN 325 MG/1
1000 TABLET ORAL ONCE
OUTPATIENT
Start: 2023-05-18 | End: 2023-05-18

## 2023-05-18 RX ORDER — MELOXICAM 7.5 MG/1
15 TABLET ORAL ONCE
OUTPATIENT
Start: 2023-05-18 | End: 2023-05-18

## 2023-05-31 ENCOUNTER — LAB (OUTPATIENT)
Dept: LAB | Facility: HOSPITAL | Age: 43
End: 2023-05-31
Payer: COMMERCIAL

## 2023-05-31 ENCOUNTER — HOSPITAL ENCOUNTER (OUTPATIENT)
Dept: CARDIOLOGY | Facility: HOSPITAL | Age: 43
Discharge: HOME | End: 2023-05-31
Payer: COMMERCIAL

## 2023-05-31 ENCOUNTER — HOSPITAL ENCOUNTER (OUTPATIENT)
Dept: GENERAL RADIOLOGY | Facility: HOSPITAL | Age: 43
Discharge: HOME | End: 2023-05-31
Payer: COMMERCIAL

## 2023-05-31 ENCOUNTER — PRE-ADMISSION TESTING (OUTPATIENT)
Dept: PREADMISSION TESTING | Facility: HOSPITAL | Age: 43
End: 2023-05-31
Payer: COMMERCIAL

## 2023-05-31 VITALS
OXYGEN SATURATION: 98 % | BODY MASS INDEX: 36.25 KG/M2 | HEIGHT: 62 IN | DIASTOLIC BLOOD PRESSURE: 97 MMHG | SYSTOLIC BLOOD PRESSURE: 152 MMHG | RESPIRATION RATE: 18 BRPM | HEART RATE: 75 BPM | WEIGHT: 197 LBS | TEMPERATURE: 97.7 F

## 2023-05-31 DIAGNOSIS — M17.0 PRIMARY OSTEOARTHRITIS OF BOTH KNEES: ICD-10-CM

## 2023-05-31 LAB
ABO GROUP BLD: NORMAL
ANION GAP SERPL CALCULATED.3IONS-SCNC: 11 MMOL/L (ref 5–15)
BACTERIA UR QL AUTO: ABNORMAL /HPF
BASOPHILS # BLD AUTO: 0.04 10*3/MM3 (ref 0–0.2)
BASOPHILS NFR BLD AUTO: 0.6 % (ref 0–1.5)
BILIRUB UR QL STRIP: NEGATIVE
BLD GP AB SCN SERPL QL: NEGATIVE
BUN SERPL-MCNC: 9 MG/DL (ref 6–20)
BUN/CREAT SERPL: 9.6 (ref 7–25)
CALCIUM SPEC-SCNC: 9.9 MG/DL (ref 8.6–10.5)
CHLORIDE SERPL-SCNC: 100 MMOL/L (ref 98–107)
CLARITY UR: CLEAR
CO2 SERPL-SCNC: 26 MMOL/L (ref 22–29)
COLOR UR: YELLOW
CREAT SERPL-MCNC: 0.94 MG/DL (ref 0.57–1)
DEPRECATED RDW RBC AUTO: 41.7 FL (ref 37–54)
EGFRCR SERPLBLD CKD-EPI 2021: 78.8 ML/MIN/1.73
EOSINOPHIL # BLD AUTO: 0.11 10*3/MM3 (ref 0–0.4)
EOSINOPHIL NFR BLD AUTO: 1.6 % (ref 0.3–6.2)
ERYTHROCYTE [DISTWIDTH] IN BLOOD BY AUTOMATED COUNT: 13.6 % (ref 12.3–15.4)
GLUCOSE SERPL-MCNC: 112 MG/DL (ref 65–99)
GLUCOSE UR STRIP-MCNC: NEGATIVE MG/DL
HBA1C MFR BLD: 5.1 % (ref 4.8–5.6)
HCT VFR BLD AUTO: 36.2 % (ref 34–46.6)
HGB BLD-MCNC: 12.4 G/DL (ref 12–15.9)
HGB UR QL STRIP.AUTO: ABNORMAL
HYALINE CASTS UR QL AUTO: ABNORMAL /LPF
IMM GRANULOCYTES # BLD AUTO: 0.02 10*3/MM3 (ref 0–0.05)
IMM GRANULOCYTES NFR BLD AUTO: 0.3 % (ref 0–0.5)
INR PPP: 1.01 (ref 0.93–1.1)
KETONES UR QL STRIP: NEGATIVE
LEUKOCYTE ESTERASE UR QL STRIP.AUTO: ABNORMAL
LYMPHOCYTES # BLD AUTO: 1.6 10*3/MM3 (ref 0.7–3.1)
LYMPHOCYTES NFR BLD AUTO: 23.9 % (ref 19.6–45.3)
MCH RBC QN AUTO: 28.8 PG (ref 26.6–33)
MCHC RBC AUTO-ENTMCNC: 34.3 G/DL (ref 31.5–35.7)
MCV RBC AUTO: 84.2 FL (ref 79–97)
MONOCYTES # BLD AUTO: 0.5 10*3/MM3 (ref 0.1–0.9)
MONOCYTES NFR BLD AUTO: 7.5 % (ref 5–12)
MRSA DNA SPEC QL NAA+PROBE: NORMAL
NEUTROPHILS NFR BLD AUTO: 4.42 10*3/MM3 (ref 1.7–7)
NEUTROPHILS NFR BLD AUTO: 66.1 % (ref 42.7–76)
NITRITE UR QL STRIP: NEGATIVE
NRBC BLD AUTO-RTO: 0 /100 WBC (ref 0–0.2)
PH UR STRIP.AUTO: 6 [PH] (ref 5–8)
PLATELET # BLD AUTO: 325 10*3/MM3 (ref 140–450)
PMV BLD AUTO: 11.2 FL (ref 6–12)
POTASSIUM SERPL-SCNC: 3.8 MMOL/L (ref 3.5–5.2)
PROT UR QL STRIP: NEGATIVE
PROTHROMBIN TIME: 10.8 SECONDS (ref 9.6–11.7)
RBC # BLD AUTO: 4.3 10*6/MM3 (ref 3.77–5.28)
RBC # UR STRIP: ABNORMAL /HPF
REF LAB TEST METHOD: ABNORMAL
RH BLD: NEGATIVE
SODIUM SERPL-SCNC: 137 MMOL/L (ref 136–145)
SP GR UR STRIP: 1.02 (ref 1–1.03)
SQUAMOUS #/AREA URNS HPF: ABNORMAL /HPF
T&S EXPIRATION DATE: NORMAL
UROBILINOGEN UR QL STRIP: ABNORMAL
WBC # UR STRIP: ABNORMAL /HPF
WBC NRBC COR # BLD: 6.69 10*3/MM3 (ref 3.4–10.8)

## 2023-05-31 PROCEDURE — 80048 BASIC METABOLIC PNL TOTAL CA: CPT

## 2023-05-31 PROCEDURE — 36415 COLL VENOUS BLD VENIPUNCTURE: CPT | Performed by: ORTHOPAEDIC SURGERY

## 2023-05-31 PROCEDURE — 85025 COMPLETE CBC W/AUTO DIFF WBC: CPT

## 2023-05-31 PROCEDURE — 73560 X-RAY EXAM OF KNEE 1 OR 2: CPT

## 2023-05-31 PROCEDURE — 86900 BLOOD TYPING SEROLOGIC ABO: CPT | Performed by: ORTHOPAEDIC SURGERY

## 2023-05-31 PROCEDURE — 86850 RBC ANTIBODY SCREEN: CPT | Performed by: ORTHOPAEDIC SURGERY

## 2023-05-31 PROCEDURE — 83036 HEMOGLOBIN GLYCOSYLATED A1C: CPT | Performed by: ORTHOPAEDIC SURGERY

## 2023-05-31 PROCEDURE — 71046 X-RAY EXAM CHEST 2 VIEWS: CPT

## 2023-05-31 PROCEDURE — 86901 BLOOD TYPING SEROLOGIC RH(D): CPT

## 2023-05-31 PROCEDURE — 93010 ELECTROCARDIOGRAM REPORT: CPT | Performed by: INTERNAL MEDICINE

## 2023-05-31 PROCEDURE — 86900 BLOOD TYPING SEROLOGIC ABO: CPT

## 2023-05-31 PROCEDURE — 81001 URINALYSIS AUTO W/SCOPE: CPT | Performed by: ORTHOPAEDIC SURGERY

## 2023-05-31 PROCEDURE — 97161 PT EVAL LOW COMPLEX 20 MIN: CPT

## 2023-05-31 PROCEDURE — 85610 PROTHROMBIN TIME: CPT | Performed by: ORTHOPAEDIC SURGERY

## 2023-05-31 PROCEDURE — 93005 ELECTROCARDIOGRAM TRACING: CPT

## 2023-05-31 PROCEDURE — 86901 BLOOD TYPING SEROLOGIC RH(D): CPT | Performed by: ORTHOPAEDIC SURGERY

## 2023-05-31 PROCEDURE — 87641 MR-STAPH DNA AMP PROBE: CPT

## 2023-05-31 NOTE — THERAPY EVALUATION
Patient Name: Aida Peralta  : 1980    MRN: [id number]                              Today's Date: 2023       Admit Date: (Not on file)    Visit Dx: No diagnosis found.  Patient Active Problem List   Diagnosis   • YULIA (generalized anxiety disorder)   • History of seizure   • Hypertension   • Hypothyroidism   • Primary osteoarthritis of both knees   • Morbid obesity   • Plica of knee, left   • Adjustment disorder     Past Medical History:   Diagnosis Date   • CTS (carpal tunnel syndrome)    • GERD (gastroesophageal reflux disease)    • Hypertension    • Migraines    • Plica of knee, left 10/22/2021   • Primary osteoarthritis of both knees 10/22/2021   • Sleep apnea     no machine     Past Surgical History:   Procedure Laterality Date   • CHOLECYSTECTOMY     • TUBAL ABDOMINAL LIGATION        General Information     Row Name 23 1306          Physical Therapy Time and Intention    Document Type evaluation  -CR     Mode of Treatment physical therapy  -CR     Row Name 23 1306          General Information    Patient Profile Reviewed yes  -CR     Prior Level of Function independent:;all household mobility;community mobility;work;driving  -CR     Row Name 23 1306          Living Environment    People in Home child(marcio), dependent;significant other  -CR     Row Name 23 1306          Home Main Entrance    Number of Stairs, Main Entrance none  -CR     Row Name 23 1306          Stairs Within Home, Primary    Stairs, Within Home, Primary can stay on main level  -CR     Row Name 23 1306          Cognition    Orientation Status (Cognition) oriented x 4  -CR     Row Name 23 1306          Safety Issues, Functional Mobility    Impairments Affecting Function (Mobility) pain;range of motion (ROM)  -CR           User Key  (r) = Recorded By, (t) = Taken By, (c) = Cosigned By    Initials Name Provider Type    CR Reyes, Carmela, PT Physical Therapist               Mobility     Row  Name 05/31/23 1309          Sit-Stand Transfer    Sit-Stand [place] (Transfers) modified independence  -CR     Row Name 05/31/23 1309          Gait/Stairs (Locomotion)    Deviations/Abnormal Patterns (Gait) antalgic  -CR     Left Sided Gait Deviations leans left  -CR     Comment, (Gait/Stairs) keeps R knee in extension to advance  -CR           User Key  (r) = Recorded By, (t) = Taken By, (c) = Cosigned By    Initials Name Provider Type    CR Reyes, Carmela, PT Physical Therapist               Obj/Interventions     Row Name 05/31/23 1311          Range of Motion Comprehensive    Comment, General Range of Motion AROM R knee -20 to 120 degrees, L knee -5 to 120 degrees in sitting  -CR     Row Name 05/31/23 1311          Strength Comprehensive (MMT)    General Manual Muscle Testing (MMT) Assessment no strength deficits identified  -CR     Row Name 05/31/23 1311          Balance    Balance Assessment sit to stand dynamic balance;standing static balance;standing dynamic balance  -CR     Sit to Stand Dynamic Balance modified independence  -CR     Static Standing Balance independent  -CR     Dynamic Standing Balance modified independence  -CR     Row Name 05/31/23 1311          Sensory Assessment (Somatosensory)    Sensory Assessment (Somatosensory) sensation intact  -CR           User Key  (r) = Recorded By, (t) = Taken By, (c) = Cosigned By    Initials Name Provider Type    CR Reyes, Carmela, PT Physical Therapist               Goals/Plan     Row Name 05/31/23 1314          Gait Training Goal 1 (PT)    Activity/Assistive Device (Gait Training Goal 1, PT) gait (walking locomotion);diminish gait deviation;walker, rolling  -CR     [place] Level (Gait Training Goal 1, PT) standby assist  -CR     Distance (Gait Training Goal 1, PT) 100  -CR     Time Frame (Gait Training Goal 1, PT) long term goal (LTG);1 week  -CR     Row Name 05/31/23 1312          ROM Goal 1 (PT)    ROM Goal 1 (PT) demonstrate active R knee ROM 0-  100 degrees  -CR     Time Frame (ROM Goal 1, PT) long-term goal (LTG);1 week  -CR     Row Name 05/31/23 1315          Patient Education Goal (PT)    Activity (Patient Education Goal, PT) HEP  -CR     [facility]/Cues/Accuracy (Memory Goal 2, PT) verbalizes understanding  -CR     Progress/Outcome (Patient Education Goal, PT) goal met  -CR     Row Name 05/31/23 1315          Therapy Assessment/Plan (PT)    Planned Therapy Interventions (PT) gait training;home exercise program;patient/family education;ROM (range of motion);strengthening  -CR           User Key  (r) = Recorded By, (t) = Taken By, (c) = Cosigned By    Initials Name Provider Type    CR Reyes, Carmela, PT Physical Therapist               Clinical Impression     Row Name 05/31/23 1312          Pain    Pretreatment Pain Rating 5/10  -CR     Posttreatment Pain Rating 6/10  -CR     Pain Location - Side/Orientation Right  -CR     Pain Location - knee  -CR     Row Name 05/31/23 1312          Plan of Care Review    Plan of Care Reviewed With patient  -CR     Outcome Evaluation 43 y/o female with DJD scheduled for R partial TKR on 6/6/2023. Patient lives with sig other and dependent children in [facility] with no step entry and basement laundry. Patient works for [employer] and drives a lot for work. AROM R knee -20 to 120 degrees, L knee -5 to 120 degrees in sitting. Patient with + limp during gait with R knee in extension during swing phase. Educated patient on HEP, use of a.d, icing, plan of care post op. Patient should do well with OP PT for follow up after surgery.  -CR     Row Name 05/31/23 1312          Therapy Assessment/Plan (PT)    Patient/Family Therapy Goals Statement (PT) walk without pain  -CR     Rehab Potential (PT) good, to achieve stated therapy goals  -CR     Criteria for Skilled Interventions Met (PT) yes;skilled treatment is necessary  -CR     Therapy Frequency (PT) 2 times/day  -CR     Predicted Duration of Therapy Intervention (PT)  dc  -CR           User Key  (r) = Recorded By, (t) = Taken By, (c) = Cosigned By    Initials Name Provider Type    CR Reyes, Carmela, PT Physical Therapist               Outcome Measures    No documentation.                              Physical Therapy Education     Title: PT OT SLP Therapies (In Progress)     Topic: Physical Therapy (In Progress)     Point: Mobility training (Not Started)     Learner Progress:  Not documented in this visit.          Point: Home exercise program (Done)     Learning Progress Summary           Patient Acceptance, E,D,H, VU by CR at 5/31/2023 1317                               User Key     Initials Effective Dates Name Provider Type Discipline    CR 06/16/21 -  Reyes, Carmela, PT Physical Therapist PT              PT Recommendation and Plan  Planned Therapy Interventions (PT): gait training, home exercise program, patient/family education, ROM (range of motion), strengthening  Plan of Care Reviewed With: patient  Outcome Evaluation: 41 y/o female with DJD scheduled for R partial TKR on 6/6/2023. Patient lives with sig other and dependent children in [facility] with no step entry and basement laundry. Patient works for [employer] and drives a lot for work. AROM R knee -20 to 120 degrees, L knee -5 to 120 degrees in sitting. Patient with + limp during gait with R knee in extension during swing phase. Educated patient on HEP, use of a.d, icing, plan of care post op. Patient should do well with OP PT for follow up after surgery.     Time Calculation:    PT Charges     Row Name 05/31/23 1317             Time Calculation    Start Time 1100  -CR      Stop Time 1121  -CR      Time Calculation (min) 21 min  -CR      PT Received On 05/31/23  -CR      PT - Next Appointment 06/06/23  -CR         Time Calculation- PT    Total Timed Code Minutes- PT 0 minute(s)  -CR            User Key  (r) = Recorded By, (t) = Taken By, (c) = Cosigned By    Initials Name Provider Type    CR Reyes, Carmela,  PT Physical Therapist              Therapy Charges for Today     Code Description Service Date Service Provider Modifiers Qty    65602304080 HC PT EVAL LOW COMPLEXITY 4 5/31/2023 Reyes, Carmela, PT GP 1             PT Discharge Summary  Anticipated Discharge Disposition (PT): home with outpatient therapy services    Carmela Reyes, PT  5/31/2023

## 2023-05-31 NOTE — DISCHARGE INSTRUCTIONS
Pre-Surgery Instructions:   Medication Instructions    buPROPion XL (WELLBUTRIN XL) 300 MG 24 hr tablet Take morning of surgery    pantoprazole (PROTONIX) 40 MG EC tablet Take morning of surgery    triamterene-hydrochlorothiazide (DYAZIDE) 37.5-25 MG per capsule Stop taking 1 day (do not takie am of surgery)  days prior to surgery    acetaminophen-codeine (TYLENOL/CODEINE #3) 300-30 MG per tablet {OR PAT MED INSTRUCTIONS:83507}

## 2023-05-31 NOTE — PLAN OF CARE
Goal Outcome Evaluation:  Plan of Care Reviewed With: patient           Outcome Evaluation: 41 y/o female with DJD scheduled for R partial TKR on 6/6/2023. Patient lives with sig other and dependent children in [facility] with no step entry and basement laundry. Patient works for [employer] and drives a lot for work. AROM R knee -20 to 120 degrees, L knee -5 to 120 degrees in sitting. Patient with + limp during gait with R knee in extension during swing phase. Educated patient on HEP, use of a.d, icing, plan of care post op. Patient should do well with OP PT for follow up after surgery.

## 2023-06-01 ENCOUNTER — TELEPHONE (OUTPATIENT)
Dept: ORTHOPEDIC SURGERY | Facility: CLINIC | Age: 43
End: 2023-06-01

## 2023-06-01 NOTE — PAT
Message sent to Dr. Pretty about trace of bacteria in urine.  Awaiting response.    He said ok to proceed with surgery on 6/6/2023.

## 2023-06-01 NOTE — TELEPHONE ENCOUNTER
Hub staff attempted to follow warm transfer process and was unsuccessful     Caller: Aida Peralta    Relationship to patient: Self    Best call back number: [phone]    Patient is needing: PATIENT IS NEEDING TO KNOW STATUS OF FMLA PAPERWORK TODAY ASAP. PLEASE FOLLOW UP WITH PATIENT REGARDING THIS.

## 2023-06-05 ENCOUNTER — ANESTHESIA EVENT (OUTPATIENT)
Dept: PERIOP | Facility: HOSPITAL | Age: 43
End: 2023-06-05
Payer: COMMERCIAL

## 2023-06-05 NOTE — ANESTHESIA PREPROCEDURE EVALUATION
Anesthesia Evaluation     Patient summary reviewed and Nursing notes reviewed   no history of anesthetic complications:   NPO Solid Status: > 8 hours  NPO Liquid Status: > 2 hours           Airway   Dental      Pulmonary    (+) ,sleep apnea  Cardiovascular     ECG reviewed    (+) hypertension      Neuro/Psych  (+) seizures, headaches, numbness, psychiatric history Anxiety  GI/Hepatic/Renal/Endo    (+) obesity, GERD, thyroid problem hypothyroidism    Musculoskeletal     Abdominal    Substance History      OB/GYN          Other   arthritis,     ROS/Med Hx Other: Additional History:      PSH:  TUBAL ABDOMINAL LIGATION CHOLECYSTECTOMY                Anesthesia Plan    ASA 3     ERAS Protocol and general with block   total IV anesthesia  (Patient identified; pre-operative vital signs, all relevant labs/studies, complete medical/surgical/anesthetic history, full medication list, full allergy list, and NPO status obtained/reviewed; physical assessment performed; anesthetic options, side effects, potential complications, risks, and benefits discussed; questions answered; written anesthesia consent obtained; patient cleared for procedure; anesthesia machine and equipment checked and functioning)  intravenous induction     Anesthetic plan, risks, benefits, and alternatives have been provided, discussed and informed consent has been obtained with: patient.    Plan discussed with CRNA and CAA.    CODE STATUS:

## 2023-06-06 ENCOUNTER — APPOINTMENT (OUTPATIENT)
Dept: GENERAL RADIOLOGY | Facility: HOSPITAL | Age: 43
End: 2023-06-06
Payer: COMMERCIAL

## 2023-06-06 ENCOUNTER — HOSPITAL ENCOUNTER (OUTPATIENT)
Facility: HOSPITAL | Age: 43
Discharge: HOME | End: 2023-06-06
Attending: ORTHOPAEDIC SURGERY | Admitting: ORTHOPAEDIC SURGERY
Payer: COMMERCIAL

## 2023-06-06 ENCOUNTER — ANESTHESIA (OUTPATIENT)
Dept: PERIOP | Facility: HOSPITAL | Age: 43
End: 2023-06-06
Payer: COMMERCIAL

## 2023-06-06 VITALS
RESPIRATION RATE: 16 BRPM | WEIGHT: 201.6 LBS | TEMPERATURE: 97 F | HEART RATE: 84 BPM | OXYGEN SATURATION: 94 % | DIASTOLIC BLOOD PRESSURE: 88 MMHG | HEIGHT: 61 IN | BODY MASS INDEX: 38.06 KG/M2 | SYSTOLIC BLOOD PRESSURE: 140 MMHG

## 2023-06-06 DIAGNOSIS — M17.0 PRIMARY OSTEOARTHRITIS OF BOTH KNEES: ICD-10-CM

## 2023-06-06 DIAGNOSIS — M17.0 PRIMARY OSTEOARTHRITIS OF BOTH KNEES: Primary | ICD-10-CM

## 2023-06-06 LAB — B-HCG UR QL: NEGATIVE

## 2023-06-06 PROCEDURE — 25010000002 LORAZEPAM PER 2 MG: Performed by: ANESTHESIOLOGY

## 2023-06-06 PROCEDURE — 25010000002 ROPIVACAINE PER 1 MG: Performed by: ORTHOPAEDIC SURGERY

## 2023-06-06 PROCEDURE — G0378 HOSPITAL OBSERVATION PER HR: HCPCS

## 2023-06-06 PROCEDURE — 25010000002 ONDANSETRON PER 1 MG: Performed by: NURSE ANESTHETIST, CERTIFIED REGISTERED

## 2023-06-06 PROCEDURE — 25010000002 KETOROLAC TROMETHAMINE PER 15 MG: Performed by: ORTHOPAEDIC SURGERY

## 2023-06-06 PROCEDURE — 25010000002 FENTANYL CITRATE (PF) 100 MCG/2ML SOLUTION: Performed by: NURSE ANESTHETIST, CERTIFIED REGISTERED

## 2023-06-06 PROCEDURE — 25010000002 CEFAZOLIN PER 500 MG: Performed by: ORTHOPAEDIC SURGERY

## 2023-06-06 PROCEDURE — C1713 ANCHOR/SCREW BN/BN,TIS/BN: HCPCS | Performed by: ORTHOPAEDIC SURGERY

## 2023-06-06 PROCEDURE — S0260 H&P FOR SURGERY: HCPCS | Performed by: ORTHOPAEDIC SURGERY

## 2023-06-06 PROCEDURE — 25010000002 EPINEPHRINE 1 MG/ML SOLUTION: Performed by: ORTHOPAEDIC SURGERY

## 2023-06-06 PROCEDURE — 25010000002 PROPOFOL 10 MG/ML EMULSION: Performed by: NURSE ANESTHETIST, CERTIFIED REGISTERED

## 2023-06-06 PROCEDURE — 25010000002 FENTANYL CITRATE (PF) 50 MCG/ML SOLUTION: Performed by: NURSE ANESTHETIST, CERTIFIED REGISTERED

## 2023-06-06 PROCEDURE — 25010000002 PROPOFOL 200 MG/20ML EMULSION: Performed by: NURSE ANESTHETIST, CERTIFIED REGISTERED

## 2023-06-06 PROCEDURE — 25010000002 ROPIVACAINE PER 1 MG: Performed by: ANESTHESIOLOGY

## 2023-06-06 PROCEDURE — 73560 X-RAY EXAM OF KNEE 1 OR 2: CPT

## 2023-06-06 PROCEDURE — 97164 PT RE-EVAL EST PLAN CARE: CPT

## 2023-06-06 PROCEDURE — 97110 THERAPEUTIC EXERCISES: CPT

## 2023-06-06 PROCEDURE — 81025 URINE PREGNANCY TEST: CPT | Performed by: ANESTHESIOLOGY

## 2023-06-06 PROCEDURE — 25010000002 DEXAMETHASONE PER 1 MG: Performed by: ANESTHESIOLOGY

## 2023-06-06 PROCEDURE — 25010000002 CLONIDINE PER 1 MG: Performed by: ORTHOPAEDIC SURGERY

## 2023-06-06 PROCEDURE — 25010000002 MIDAZOLAM PER 1 MG: Performed by: ANESTHESIOLOGY

## 2023-06-06 PROCEDURE — 25010000002 HYDROMORPHONE 1 MG/ML SOLUTION: Performed by: NURSE ANESTHETIST, CERTIFIED REGISTERED

## 2023-06-06 PROCEDURE — 25010000002 SUGAMMADEX 200 MG/2ML SOLUTION: Performed by: NURSE ANESTHETIST, CERTIFIED REGISTERED

## 2023-06-06 PROCEDURE — 27446 REVISION OF KNEE JOINT: CPT | Performed by: ORTHOPAEDIC SURGERY

## 2023-06-06 RX ORDER — ONDANSETRON 4 MG/1
4 TABLET, FILM COATED ORAL EVERY 8 HOURS PRN
Qty: 1 TABLET | Refills: 0 | Status: SHIPPED | OUTPATIENT
Start: 2023-06-06

## 2023-06-06 RX ORDER — ONDANSETRON 4 MG/1
4 TABLET, FILM COATED ORAL ONCE AS NEEDED
Status: DISCONTINUED | OUTPATIENT
Start: 2023-06-06 | End: 2023-06-06 | Stop reason: HOSPADM

## 2023-06-06 RX ORDER — ONDANSETRON 2 MG/ML
INJECTION INTRAMUSCULAR; INTRAVENOUS AS NEEDED
Status: DISCONTINUED | OUTPATIENT
Start: 2023-06-06 | End: 2023-06-06 | Stop reason: SURG

## 2023-06-06 RX ORDER — HYDROCODONE BITARTRATE AND ACETAMINOPHEN 5; 325 MG/1; MG/1
1 TABLET ORAL ONCE AS NEEDED
Status: DISCONTINUED | OUTPATIENT
Start: 2023-06-06 | End: 2023-06-06 | Stop reason: HOSPADM

## 2023-06-06 RX ORDER — TRANEXAMIC ACID 10 MG/ML
1000 INJECTION, SOLUTION INTRAVENOUS ONCE
Status: DISCONTINUED | OUTPATIENT
Start: 2023-06-06 | End: 2023-06-06 | Stop reason: HOSPADM

## 2023-06-06 RX ORDER — OXYCODONE HCL 10 MG/1
10 TABLET, FILM COATED, EXTENDED RELEASE ORAL ONCE
Status: COMPLETED | OUTPATIENT
Start: 2023-06-06 | End: 2023-06-06

## 2023-06-06 RX ORDER — SODIUM CHLORIDE 0.9 % (FLUSH) 0.9 %
10 SYRINGE (ML) INJECTION AS NEEDED
Status: DISCONTINUED | OUTPATIENT
Start: 2023-06-06 | End: 2023-06-06 | Stop reason: HOSPADM

## 2023-06-06 RX ORDER — SODIUM CHLORIDE 0.9 % (FLUSH) 0.9 %
SYRINGE (ML) INJECTION AS NEEDED
Status: DISCONTINUED | OUTPATIENT
Start: 2023-06-06 | End: 2023-06-06 | Stop reason: HOSPADM

## 2023-06-06 RX ORDER — SODIUM CHLORIDE 0.9 % (FLUSH) 0.9 %
10 SYRINGE (ML) INJECTION EVERY 12 HOURS SCHEDULED
Status: DISCONTINUED | OUTPATIENT
Start: 2023-06-06 | End: 2023-06-06 | Stop reason: HOSPADM

## 2023-06-06 RX ORDER — LABETALOL HYDROCHLORIDE 5 MG/ML
5 INJECTION, SOLUTION INTRAVENOUS
Status: DISCONTINUED | OUTPATIENT
Start: 2023-06-06 | End: 2023-06-06 | Stop reason: HOSPADM

## 2023-06-06 RX ORDER — SODIUM CHLORIDE, SODIUM LACTATE, POTASSIUM CHLORIDE, CALCIUM CHLORIDE 600; 310; 30; 20 MG/100ML; MG/100ML; MG/100ML; MG/100ML
9 INJECTION, SOLUTION INTRAVENOUS CONTINUOUS PRN
Status: DISCONTINUED | OUTPATIENT
Start: 2023-06-06 | End: 2023-06-06 | Stop reason: HOSPADM

## 2023-06-06 RX ORDER — ONDANSETRON 2 MG/ML
4 INJECTION INTRAMUSCULAR; INTRAVENOUS ONCE AS NEEDED
Status: DISCONTINUED | OUTPATIENT
Start: 2023-06-06 | End: 2023-06-06 | Stop reason: HOSPADM

## 2023-06-06 RX ORDER — DROPERIDOL 2.5 MG/ML
0.62 INJECTION, SOLUTION INTRAMUSCULAR; INTRAVENOUS
Status: DISCONTINUED | OUTPATIENT
Start: 2023-06-06 | End: 2023-06-06 | Stop reason: HOSPADM

## 2023-06-06 RX ORDER — ACETAMINOPHEN 500 MG
1000 TABLET ORAL ONCE
Status: COMPLETED | OUTPATIENT
Start: 2023-06-06 | End: 2023-06-06

## 2023-06-06 RX ORDER — MIDAZOLAM HYDROCHLORIDE 1 MG/ML
INJECTION INTRAMUSCULAR; INTRAVENOUS AS NEEDED
Status: DISCONTINUED | OUTPATIENT
Start: 2023-06-06 | End: 2023-06-06 | Stop reason: SURG

## 2023-06-06 RX ORDER — HYDRALAZINE HYDROCHLORIDE 20 MG/ML
5 INJECTION INTRAMUSCULAR; INTRAVENOUS
Status: DISCONTINUED | OUTPATIENT
Start: 2023-06-06 | End: 2023-06-06 | Stop reason: HOSPADM

## 2023-06-06 RX ORDER — PROMETHAZINE HYDROCHLORIDE 25 MG/1
25 TABLET ORAL ONCE AS NEEDED
Status: DISCONTINUED | OUTPATIENT
Start: 2023-06-06 | End: 2023-06-06 | Stop reason: HOSPADM

## 2023-06-06 RX ORDER — ROPIVACAINE HYDROCHLORIDE 5 MG/ML
INJECTION, SOLUTION EPIDURAL; INFILTRATION; PERINEURAL
Status: COMPLETED | OUTPATIENT
Start: 2023-06-06 | End: 2023-06-06

## 2023-06-06 RX ORDER — FLUMAZENIL 0.1 MG/ML
0.2 INJECTION INTRAVENOUS AS NEEDED
Status: DISCONTINUED | OUTPATIENT
Start: 2023-06-06 | End: 2023-06-06 | Stop reason: HOSPADM

## 2023-06-06 RX ORDER — DIPHENHYDRAMINE HYDROCHLORIDE 50 MG/ML
12.5 INJECTION INTRAMUSCULAR; INTRAVENOUS
Status: DISCONTINUED | OUTPATIENT
Start: 2023-06-06 | End: 2023-06-06 | Stop reason: HOSPADM

## 2023-06-06 RX ORDER — ROCURONIUM BROMIDE 10 MG/ML
INJECTION, SOLUTION INTRAVENOUS AS NEEDED
Status: DISCONTINUED | OUTPATIENT
Start: 2023-06-06 | End: 2023-06-06 | Stop reason: SURG

## 2023-06-06 RX ORDER — FENTANYL CITRATE 50 UG/ML
50 INJECTION, SOLUTION INTRAMUSCULAR; INTRAVENOUS
Status: DISCONTINUED | OUTPATIENT
Start: 2023-06-06 | End: 2023-06-06 | Stop reason: HOSPADM

## 2023-06-06 RX ORDER — DEXAMETHASONE SODIUM PHOSPHATE 4 MG/ML
INJECTION, SOLUTION INTRA-ARTICULAR; INTRALESIONAL; INTRAMUSCULAR; INTRAVENOUS; SOFT TISSUE
Status: COMPLETED | OUTPATIENT
Start: 2023-06-06 | End: 2023-06-06

## 2023-06-06 RX ORDER — LIDOCAINE HYDROCHLORIDE 20 MG/ML
INJECTION, SOLUTION EPIDURAL; INFILTRATION; INTRACAUDAL; PERINEURAL AS NEEDED
Status: DISCONTINUED | OUTPATIENT
Start: 2023-06-06 | End: 2023-06-06 | Stop reason: SURG

## 2023-06-06 RX ORDER — OXYCODONE HYDROCHLORIDE AND ACETAMINOPHEN 5; 325 MG/1; MG/1
1 TABLET ORAL EVERY 4 HOURS PRN
Status: DISCONTINUED | OUTPATIENT
Start: 2023-06-06 | End: 2023-06-06 | Stop reason: HOSPADM

## 2023-06-06 RX ORDER — NALOXONE HCL 0.4 MG/ML
0.2 VIAL (ML) INJECTION AS NEEDED
Status: DISCONTINUED | OUTPATIENT
Start: 2023-06-06 | End: 2023-06-06 | Stop reason: HOSPADM

## 2023-06-06 RX ORDER — ACETAMINOPHEN 650 MG
TABLET, EXTENDED RELEASE ORAL AS NEEDED
Status: DISCONTINUED | OUTPATIENT
Start: 2023-06-06 | End: 2023-06-06 | Stop reason: HOSPADM

## 2023-06-06 RX ORDER — FENTANYL CITRATE 50 UG/ML
INJECTION, SOLUTION INTRAMUSCULAR; INTRAVENOUS AS NEEDED
Status: DISCONTINUED | OUTPATIENT
Start: 2023-06-06 | End: 2023-06-06 | Stop reason: SURG

## 2023-06-06 RX ORDER — OXYCODONE AND ACETAMINOPHEN 7.5; 325 MG/1; MG/1
1 TABLET ORAL EVERY 6 HOURS PRN
Qty: 1 TABLET | Refills: 0 | Status: SHIPPED | OUTPATIENT
Start: 2023-06-06

## 2023-06-06 RX ORDER — MELOXICAM 15 MG/1
15 TABLET ORAL ONCE
Status: COMPLETED | OUTPATIENT
Start: 2023-06-06 | End: 2023-06-06

## 2023-06-06 RX ORDER — OXYCODONE HYDROCHLORIDE AND ACETAMINOPHEN 5; 325 MG/1; MG/1
1-2 TABLET ORAL
Qty: 40 TABLET | Refills: 0 | Status: SHIPPED | OUTPATIENT
Start: 2023-06-06

## 2023-06-06 RX ORDER — PROMETHAZINE HYDROCHLORIDE 25 MG/1
25 SUPPOSITORY RECTAL ONCE AS NEEDED
Status: DISCONTINUED | OUTPATIENT
Start: 2023-06-06 | End: 2023-06-06 | Stop reason: HOSPADM

## 2023-06-06 RX ORDER — TRANEXAMIC ACID 100 MG/ML
INJECTION, SOLUTION INTRAVENOUS AS NEEDED
Status: DISCONTINUED | OUTPATIENT
Start: 2023-06-06 | End: 2023-06-06 | Stop reason: SURG

## 2023-06-06 RX ORDER — PROPOFOL 10 MG/ML
INJECTION, EMULSION INTRAVENOUS AS NEEDED
Status: DISCONTINUED | OUTPATIENT
Start: 2023-06-06 | End: 2023-06-06 | Stop reason: SURG

## 2023-06-06 RX ORDER — ONDANSETRON 4 MG/1
4 TABLET, FILM COATED ORAL EVERY 6 HOURS PRN
Qty: 30 TABLET | Refills: 0 | Status: SHIPPED | OUTPATIENT
Start: 2023-06-06

## 2023-06-06 RX ORDER — IPRATROPIUM BROMIDE AND ALBUTEROL SULFATE 2.5; .5 MG/3ML; MG/3ML
3 SOLUTION RESPIRATORY (INHALATION) ONCE AS NEEDED
Status: DISCONTINUED | OUTPATIENT
Start: 2023-06-06 | End: 2023-06-06 | Stop reason: HOSPADM

## 2023-06-06 RX ORDER — POLYETHYLENE GLYCOL 3350 17 G/17G
17 POWDER, FOR SOLUTION ORAL DAILY
Qty: 238 G | Refills: 0 | Status: SHIPPED | OUTPATIENT
Start: 2023-06-06 | End: 2023-06-20

## 2023-06-06 RX ORDER — LORAZEPAM 2 MG/ML
0.5 INJECTION INTRAMUSCULAR
Status: DISCONTINUED | OUTPATIENT
Start: 2023-06-06 | End: 2023-06-06 | Stop reason: HOSPADM

## 2023-06-06 RX ORDER — DEXAMETHASONE SODIUM PHOSPHATE 4 MG/ML
INJECTION, SOLUTION INTRA-ARTICULAR; INTRALESIONAL; INTRAMUSCULAR; INTRAVENOUS; SOFT TISSUE AS NEEDED
Status: DISCONTINUED | OUTPATIENT
Start: 2023-06-06 | End: 2023-06-06 | Stop reason: SURG

## 2023-06-06 RX ADMIN — ACETAMINOPHEN 1000 MG: 500 TABLET, FILM COATED ORAL at 06:43

## 2023-06-06 RX ADMIN — ROPIVACAINE HYDROCHLORIDE 30 ML: 5 INJECTION EPIDURAL; INFILTRATION; PERINEURAL at 07:25

## 2023-06-06 RX ADMIN — OXYCODONE AND ACETAMINOPHEN 1 TABLET: 325; 5 TABLET ORAL at 11:58

## 2023-06-06 RX ADMIN — OXYCODONE HYDROCHLORIDE 10 MG: 10 TABLET, FILM COATED, EXTENDED RELEASE ORAL at 06:43

## 2023-06-06 RX ADMIN — FENTANYL CITRATE 50 MCG: 50 INJECTION, SOLUTION INTRAMUSCULAR; INTRAVENOUS at 10:00

## 2023-06-06 RX ADMIN — ROCURONIUM BROMIDE 50 MG: 50 INJECTION, SOLUTION INTRAVENOUS at 07:40

## 2023-06-06 RX ADMIN — SODIUM CHLORIDE, POTASSIUM CHLORIDE, SODIUM LACTATE AND CALCIUM CHLORIDE 9 ML/HR: 600; 310; 30; 20 INJECTION, SOLUTION INTRAVENOUS at 06:29

## 2023-06-06 RX ADMIN — PROPOFOL 200 MG: 10 INJECTION, EMULSION INTRAVENOUS at 07:40

## 2023-06-06 RX ADMIN — HYDROMORPHONE HYDROCHLORIDE 0.5 MG: 1 INJECTION, SOLUTION INTRAMUSCULAR; INTRAVENOUS; SUBCUTANEOUS at 09:48

## 2023-06-06 RX ADMIN — FENTANYL CITRATE 50 MCG: 50 INJECTION, SOLUTION INTRAMUSCULAR; INTRAVENOUS at 07:40

## 2023-06-06 RX ADMIN — LORAZEPAM 0.5 MG: 2 INJECTION INTRAMUSCULAR; INTRAVENOUS at 10:48

## 2023-06-06 RX ADMIN — TRANEXAMIC ACID 1000 MG: 100 INJECTION, SOLUTION INTRAVENOUS at 09:05

## 2023-06-06 RX ADMIN — DEXAMETHASONE SODIUM PHOSPHATE 4 MG: 4 INJECTION, SOLUTION INTRA-ARTICULAR; INTRALESIONAL; INTRAMUSCULAR; INTRAVENOUS; SOFT TISSUE at 07:44

## 2023-06-06 RX ADMIN — TRANEXAMIC ACID 1000 MG: 100 INJECTION, SOLUTION INTRAVENOUS at 07:55

## 2023-06-06 RX ADMIN — PROPOFOL 200 MCG/KG/MIN: 10 INJECTION, EMULSION INTRAVENOUS at 07:45

## 2023-06-06 RX ADMIN — FENTANYL CITRATE 50 MCG: 50 INJECTION, SOLUTION INTRAMUSCULAR; INTRAVENOUS at 08:05

## 2023-06-06 RX ADMIN — SUGAMMADEX 200 MG: 100 INJECTION, SOLUTION INTRAVENOUS at 09:15

## 2023-06-06 RX ADMIN — MIDAZOLAM 2 MG: 1 INJECTION INTRAMUSCULAR; INTRAVENOUS at 07:22

## 2023-06-06 RX ADMIN — HYDROMORPHONE HYDROCHLORIDE 0.5 MG: 1 INJECTION, SOLUTION INTRAMUSCULAR; INTRAVENOUS; SUBCUTANEOUS at 10:12

## 2023-06-06 RX ADMIN — DEXAMETHASONE SODIUM PHOSPHATE 4 MG: 4 INJECTION, SOLUTION INTRAMUSCULAR; INTRAVENOUS at 07:25

## 2023-06-06 RX ADMIN — ONDANSETRON 4 MG: 2 INJECTION INTRAMUSCULAR; INTRAVENOUS at 07:44

## 2023-06-06 RX ADMIN — CEFAZOLIN 2 G: 2 INJECTION, POWDER, FOR SOLUTION INTRAMUSCULAR; INTRAVENOUS at 07:44

## 2023-06-06 RX ADMIN — FENTANYL CITRATE 100 MCG: 50 INJECTION, SOLUTION INTRAMUSCULAR; INTRAVENOUS at 08:12

## 2023-06-06 RX ADMIN — LIDOCAINE HYDROCHLORIDE 100 MG: 20 INJECTION, SOLUTION EPIDURAL; INFILTRATION; INTRACAUDAL; PERINEURAL at 07:40

## 2023-06-06 RX ADMIN — MELOXICAM 15 MG: 15 TABLET ORAL at 06:43

## 2023-06-06 NOTE — PROGRESS NOTES
Verified demographics and explained services. Pt is agreeable and has no other agency.    PT    Dr. Siddhartha Pretty will be the following provider    PCP:Aida Sheldon NP  Pharmacy: [facility] [address] in [place], [place]

## 2023-06-06 NOTE — ANESTHESIA POSTPROCEDURE EVALUATION
Patient: Aida Peralta    Procedure Summary       Date: 06/06/23 Room / Location: [facility] OR 11 / [facility] MAIN OR    Anesthesia Start: 0730 Anesthesia Stop: 0923    Procedure: PARTIAL KNEE ARTHROPLASTY (Right: Knee) Diagnosis:       Primary osteoarthritis of both knees      (Primary osteoarthritis of both knees [M17.0])    Surgeons: Siddhartha Pretty MD Provider: Nikolay Cox MD    Anesthesia Type: ERAS Protocol, general with block ASA Status: 3            Anesthesia Type: ERAS Protocol, general with block    Vitals  Vitals Value Taken Time   /75 06/06/23 1116   Temp 98.1 °F (36.7 °C) 06/06/23 1116   Pulse 83 06/06/23 1118   Resp 16 06/06/23 1116   SpO2 89 % 06/06/23 1118   Vitals shown include unvalidated device data.        Post Anesthesia Care and Evaluation    Patient location during evaluation: PACU  Patient participation: complete - patient participated  Level of consciousness: awake  Pain scale: See nurse's notes for pain score.  Pain management: adequate    Airway patency: patent  Anesthetic complications: No anesthetic complications  PONV Status: none  Cardiovascular status: acceptable  Respiratory status: acceptable and spontaneous ventilation  Hydration status: acceptable    Comments: Patient seen and examined postoperatively; vital signs stable; SpO2 greater than or equal to 90%; cardiopulmonary status stable; nausea/vomiting adequately controlled; pain adequately controlled; no apparent anesthesia complications; patient discharged from anesthesia care when discharge criteria were met

## 2023-06-06 NOTE — PLAN OF CARE
Goal Outcome Evaluation:  Plan of Care Reviewed With: patient, spouse  Pt presents as a 43 y/o F s/p elective right TKA today per Dr. Pretty. Procedure was tolerated well. Pt was tearful throughout treatment- Nsg aware and gave pt pain meds. AROM right knee 0 to 80 degrees. Pt performed 10 reps Right TJA protocol exercises with cues. Pt required CGA of 1 for transfers and 30 feet of gait tolerance with rolling walker. PT recommendation is Home with Assist and Home Health Physical Therapy. Pt has a rolling walker.

## 2023-06-06 NOTE — THERAPY RE-EVALUATION
Patient Name: Aida Peralta  : 1980    MRN: [id number]                              Today's Date: 2023       Admit Date: 2023    Visit Dx:     ICD-10-CM ICD-9-CM   1. Primary osteoarthritis of both knees  M17.0 715.16     Patient Active Problem List   Diagnosis    YULIA (generalized anxiety disorder)    History of seizure    Hypertension    Hypothyroidism    Primary osteoarthritis of both knees    Morbid obesity    Plica of knee, left    Adjustment disorder     Past Medical History:   Diagnosis Date    CTS (carpal tunnel syndrome)     GERD (gastroesophageal reflux disease)     Hypertension     Migraines     Plica of knee, left 10/22/2021    Primary osteoarthritis of both knees 10/22/2021    Sleep apnea     no machine     Past Surgical History:   Procedure Laterality Date    CHOLECYSTECTOMY      TUBAL ABDOMINAL LIGATION        General Information       Row Name 23 1258          Physical Therapy Time and Intention    Document Type re-evaluation  -BR     Mode of Treatment physical therapy  -BR       Row Name 23 1258          Safety Issues, Functional Mobility    Impairments Affecting Function (Mobility) pain;range of motion (ROM);strength;balance;endurance/activity tolerance  -BR               User Key  (r) = Recorded By, (t) = Taken By, (c) = Cosigned By      Initials Name Provider Type    BR Hanna Coyne PT Physical Therapist                   Mobility       Row Name 23 1259          Bed Mobility    Bed Mobility supine-sit;sit-supine;scooting/bridging  -BR     Scooting/Bridging [place] (Bed Mobility) set up  -BR     Supine-Sit [place] (Bed Mobility) standby assist  -BR     Sit-Supine [place] (Bed Mobility) contact guard  -BR       Row Name 23 1259          Sit-Stand Transfer    Sit-Stand [place] (Transfers) contact guard  -BR     Assistive Device (Sit-Stand Transfers) walker, front-wheeled  -BR       Row Name 23 1259          Gait/Stairs  (Locomotion)    [place] Level (Gait) contact guard;1 person assist  -BR     Assistive Device (Gait) walker, front-wheeled  -BR     Ambulated day of surgery or within 4 hours of PACU discharge yes  -BR     Distance in Feet (Gait) 30  -BR     Deviations/Abnormal Patterns (Gait) antalgic;weight shifting decreased  -BR     Right Sided Gait Deviations heel strike decreased;weight shift ability decreased  -BR       Row Name 06/06/23 1259          Mobility    Extremity Weight-bearing Status right lower extremity  -BR     Right Lower Extremity (Weight-bearing Status) weight-bearing as tolerated (WBAT)  -BR               User Key  (r) = Recorded By, (t) = Taken By, (c) = Cosigned By      Initials Name Provider Type    Hanna Alba PT Physical Therapist                   Obj/Interventions       Row Name 06/06/23 1304          Range of Motion Comprehensive    Comment, General Range of Motion AROM right knee 0 to 80 degrees  -BR       Row Name 06/06/23 1304          Strength Comprehensive (MMT)    Comment, General Manual Muscle Testing (MMT) Assessment grossly 3/5 RLE  -BR       Row Name 06/06/23 1304          Motor Skills    Therapeutic Exercise knee  TKA protocol exercises x 10 reps Right knee  -BR       Row Name 06/06/23 1304          Balance    Balance Assessment sitting static balance;sitting dynamic balance;standing static balance  -BR     Static Sitting Balance standby assist  -BR     Dynamic Sitting Balance standby assist  -BR     Position, Sitting Balance unsupported;sitting edge of bed  -BR     Static Standing Balance contact guard  -BR     Position/Device Used, Standing Balance walker, rolling  -BR       Row Name 06/06/23 1304          Sensory Assessment (Somatosensory)    Sensory Assessment (Somatosensory) sensation intact  -BR               User Key  (r) = Recorded By, (t) = Taken By, (c) = Cosigned By      Initials Name Provider Type    Hanna Alba PT Physical Therapist                    Goals/Plan       Row Name 06/06/23 1313          Bed Mobility Goal 1 (PT)    Activity/Assistive Device (Bed Mobility Goal 1, PT) bed mobility activities, all  -BR     [place] Level/Cues Needed (Bed Mobility Goal 1, PT) modified independence  -BR     Time Frame (Bed Mobility Goal 1, PT) long term goal (LTG);2 weeks  -BR       Row Name 06/06/23 1313          Transfer Goal 1 (PT)    Activity/Assistive Device (Transfer Goal 1, PT) transfers, all  -BR     [place] Level/Cues Needed (Transfer Goal 1, PT) modified independence  -BR     Time Frame (Transfer Goal 1, PT) long term goal (LTG);2 weeks  -BR       Row Name 06/06/23 1313          Gait Training Goal 1 (PT)    Activity/Assistive Device (Gait Training Goal 1, PT) gait (walking locomotion);diminish gait deviation;walker, rolling  -BR     Progress/Outcome (Gait Training Goal 1, PT) goal ongoing  -BR       [facility] Name 06/06/23 1313          ROM Goal 1 (PT)    Progress/Outcome (ROM Goal 1, PT) goal ongoing  -BR       [facility] Name 06/06/23 1313          Patient Education Goal (PT)    Progress/Outcome (Patient Education Goal, PT) goal ongoing  -BR       [facility] Name 06/06/23 1313          Therapy Assessment/Plan (PT)    Planned Therapy Interventions (PT) balance training;bed mobility training;gait training;transfer training;patient/family education;home exercise program;ROM (range of motion);stair training;strengthening;stretching  -BR               User Key  (r) = Recorded By, (t) = Taken By, (c) = Cosigned By      Initials Name Provider Type    BR Hanna Coyne, PT Physical Therapist                   Clinical Impression       Row Name 06/06/23 1308          Pain    Pretreatment Pain Rating 4/10  -BR     Posttreatment Pain Rating 8/10  -BR     Pain Location - Side/Orientation Right  -BR     Pain Location posterior  -BR     Pain Location - knee;ankle;calf  -BR       Row Name 06/06/23 1313 06/06/23 1308       Plan of Care Review    Plan of Care Reviewed With --  patient;spouse  -BR    Outcome Evaluation Pt presents as a 43 y/o F s/p elective right TKA today per Dr. Pretty. Procedure was tolerated well. Pt was tearful throughout treatment- Nsg aware and gave pt pain meds. AROM right knee 0 to 80 degrees. Pt performed 10 reps Right TJA protocol exercises with cues. Pt required CGA of 1 for transfers and 30 feet of gait tolerance with rolling walker. PT recommendation is Home with Assist and Home Health Physical Therapy. Pt has a rolling walker.  -BR --      Row Name 06/06/23 1308          Therapy Assessment/Plan (PT)    Rehab Potential (PT) good, to achieve stated therapy goals  -BR     Criteria for Skilled Interventions Met (PT) yes;meets criteria;skilled treatment is necessary  -BR     Therapy Frequency (PT) 2 times/day  -BR     Predicted Duration of Therapy Intervention (PT) until D/C  -BR       Row Name 06/06/23 1308          Vital Signs    Pre SpO2 (%) 99  -BR     O2 Delivery Pre Treatment room air  -BR     Pre Patient Position Supine  -BR     Intra Patient Position Standing  -BR     Post Patient Position Supine  -BR       Row Name 06/06/23 1308          Positioning and Restraints    Pre-Treatment Position in bed  -BR     Post Treatment Position bed  -BR     In Bed notified nsg;fowlers;call light within reach;encouraged to call for assist;with family/caregiver;side rails up x3  -BR               User Key  (r) = Recorded By, (t) = Taken By, (c) = Cosigned By      Initials Name Provider Type    BR Hanna Coyne, PT Physical Therapist                   Outcome Measures       Row Name 06/06/23 1317          How much help from another person do you currently need...    Turning from your back to your side while in flat bed without using bedrails? 4  -BR     Moving from lying on back to sitting on the side of a flat bed without bedrails? 4  -BR     Moving to and from a bed to a chair (including a wheelchair)? 3  -BR     Standing up from a chair using your arms (e.g.,  wheelchair, bedside chair)? 3  -BR     Climbing 3-5 steps with a railing? 3  -BR     To walk in hospital room? 3  -BR     AM-PAC 6 Clicks Score (PT) 20  -BR     Highest level of mobility 6 --> Walked 10 steps or more  -BR       Row Name 06/06/23 1314          Functional Assessment    Outcome Measure Options AM-PAC 6 Clicks Basic Mobility (PT)  -BR               User Key  (r) = Recorded By, (t) = Taken By, (c) = Cosigned By      Initials Name Provider Type    BR Hanna Coyne, PT Physical Therapist                                 Physical Therapy Education       Title: PT OT SLP Therapies (Done)       Topic: Physical Therapy (Done)       Point: Mobility training (Done)       Learning Progress Summary             Patient Acceptance, E,D,H, VU,DU by BR at 6/6/2023 1314   Family Acceptance, E,D,H, VU,DU by BR at 6/6/2023 1314                         Point: Home exercise program (Done)       Learning Progress Summary             Patient Acceptance, E,D,H, VU,DU by BR at 6/6/2023 1314    Acceptance, E,D,H, VU by CR at 5/31/2023 1317   Family Acceptance, E,D,H, VU,DU by BR at 6/6/2023 1314                         Point: Body mechanics (Done)       Learning Progress Summary             Patient Acceptance, E,D,H, VU,DU by BR at 6/6/2023 1314   Family Acceptance, E,D,H, VU,DU by BR at 6/6/2023 1314                         Point: Precautions (Done)       Learning Progress Summary             Patient Acceptance, E,D,H, VU,DU by BR at 6/6/2023 1314   Family Acceptance, E,D,H, VU,DU by BR at 6/6/2023 1314                                         User Key       Initials Effective Dates Name Provider Type Discipline    CR 06/16/21 -  Reyes, Carmela, KELTON Physical Therapist PT    DAVE 02/01/22 -  Hanna Coyne PT Physical Therapist PT                  PT Recommendation and Plan  Planned Therapy Interventions (PT): balance training, bed mobility training, gait training, transfer training, patient/family education, home exercise  program, ROM (range of motion), stair training, strengthening, stretching  Plan of Care Reviewed With: patient, spouse  Outcome Evaluation: Pt presents as a 43 y/o F s/p elective right TKA today per Dr. Pretty. Procedure was tolerated well. Pt was tearful throughout treatment- Nsg aware and gave pt pain meds. AROM right knee 0 to 80 degrees. Pt performed 10 reps Right TJA protocol exercises with cues. Pt required CGA of 1 for transfers and 30 feet of gait tolerance with rolling walker. PT recommendation is Home with Assist and Home Health Physical Therapy. Pt has a rolling walker.     Time Calculation:    PT Charges       Row Name 06/06/23 1315             Time Calculation    Start Time 1135  -BR      Stop Time 1215  -BR      Time Calculation (min) 40 min  -BR      PT Received On 06/06/23  -BR      PT - Next Appointment 06/07/23  -BR      PT Goal Re-Cert Due Date 06/20/23  -BR         Time Calculation- PT    Total Timed Code Minutes- PT 15 minute(s)  -BR                User Key  (r) = Recorded By, (t) = Taken By, (c) = Cosigned By      Initials Name Provider Type    Hanna Alba, PT Physical Therapist                  Therapy Charges for Today       Code Description Service Date Service Provider Modifiers Qty    06117069295 HC PT RE-EVAL ESTABLISHED PLAN 2 6/6/2023 Hanna Coyne, PT GP 1    59026502991 HC PT THER PROC EA 15 MIN 6/6/2023 Hanna Coyne, PT GP 1            PT G-Codes  Outcome Measure Options: AM-PAC 6 Clicks Basic Mobility (PT)  AM-PAC 6 Clicks Score (PT): 20  PT Discharge Summary  Anticipated Discharge Disposition (PT): home with home health, home with assist    Hanna Coyne PT  6/6/2023

## 2023-06-06 NOTE — DISCHARGE SUMMARY
Orthopedic Discharge Summary      Patient: Aida Peralta      YOB: 1980    Medical Record Number: [id number]    Attending Physician: Siddhartha Pretty MD orthopedic surgery  Consulting Physician(s):   Date of Admission: 6/6/2023  5:39 AM  Date of Discharge: 6/6/2023  Patient underwent a partial knee arthroplasty this morning.  She has done extremely well postoperatively.  Pain is well controlled.  She is tolerating mobility with physical therapy and has been released from the hospital in stable satisfactory fashion.  She will be on Eliquis for DVT prophylaxis.      Primary osteoarthritis of both knees    DE ARTHRP KNEE CONDYLE&PLATEAU MEDIAL/LAT CMPRT [64727] (TOTAL KNEE PARTIAL; ARTHROPLASTY WITH GISELLE ROBOT)       Allergies   Allergen Reactions    Lisinopril Itching       Current Medications:     Discharge Medications        New Medications        Instructions Start Date   ClearLax 17 GM/SCOOP powder  Generic drug: polyethylene glycol   Mix 17 g in liquid and drink by mouth Daily for 10 days.      Eliquis 2.5 MG tablet tablet  Generic drug: apixaban   2.5 mg, Oral, 2 Times Daily      ondansetron 4 MG tablet  Commonly known as: Zofran   4 mg, Oral, Every 6 Hours PRN      ondansetron 4 MG tablet  Commonly known as: Zofran   4 mg, Oral, Every 8 Hours PRN      oxyCODONE-acetaminophen 5-325 MG per tablet  Commonly known as: PERCOCET   1-2 tablets, Oral, Every 4 to 6 Hours PRN      oxyCODONE-acetaminophen 7.5-325 MG per tablet  Commonly known as: PERCOCET   1 tablet, Oral, Every 6 Hours PRN             Continue These Medications        Instructions Start Date   buPROPion  MG 24 hr tablet  Commonly known as: WELLBUTRIN XL   1 tablet, Daily      pantoprazole 40 MG EC tablet  Commonly known as: PROTONIX   40 mg, Oral, Daily      triamterene-hydrochlorothiazide 37.5-25 MG per capsule  Commonly known as: DYAZIDE   1 capsule, Oral, Daily             ASK your doctor about these medications        Instructions  Start Date   acetaminophen-codeine 300-30 MG per tablet  Commonly known as: TYLENOL/CODEINE #3   1-2 tablets, Oral, Every 6 Hours PRN                    Past Medical History:   Diagnosis Date    CTS (carpal tunnel syndrome) 2012    GERD (gastroesophageal reflux disease)     Hypertension     Migraines     Plica of knee, left 10/22/2021    Primary osteoarthritis of both knees 10/22/2021    Sleep apnea     no machine        Past Surgical History:   Procedure Laterality Date    CHOLECYSTECTOMY      TUBAL ABDOMINAL LIGATION          Social History     Occupational History    Not on file   Tobacco Use    Smoking status: Never    Smokeless tobacco: Never   Vaping Use    Vaping Use: Never used   Substance and Sexual Activity    Alcohol use: Yes     Alcohol/week: 2.0 standard drinks     Types: 2 Glasses of wine per week    Drug use: Never    Sexual activity: Yes     Partners: Male     Birth control/protection: Tubal ligation      Social History     Social History Narrative    Not on file        Family History   Problem Relation Age of Onset    Cancer Father              Hospital Course:  42 y.o. female admitted to [facility] to services of No att. providers found with Primary osteoarthritis of both knees [M17.0] on 6/6/2023 and underwent [place] ARTHRP KNEE CONDYLE&PLATEAU MEDIAL/LAT CMPRT [26717] (TOTAL KNEE PARTIAL; ARTHROPLASTY WITH GISELLE ROBOT) Per No att. providers found. Antibiotic and VTE prophylaxis were per [facility] protocols. Post-operatively the patient transferred to the post-operative floor where the patient underwent mobilization therapy that included active as well as passive ROM exercises. Opioids were titrated to achieve appropriate pain management to allow for participation in mobilization exercises. Vital signs are now stable. The incision is intact without signs or symptoms of infection. Operative extremity neurovascular status remains intact.   Appropriate education re: incision care, activity levels,  medications, and follow up visits was completed and all questions were answered. The patient is now deemed stable for discharge from hospital.      DIAGNOSTIC TESTS:   Admission on 06/06/2023, Discharged on 06/06/2023   Component Date Value Ref Range Status    QT Interval 05/31/2023 355  ms Preliminary    ABO Type 05/31/2023 B   Final    RH type 05/31/2023 Negative   Final    Antibody Screen 05/31/2023 Negative   Final    T&S Expiration Date 05/31/2023 6/8/2023 11:59:00 PM   Final    Protime 05/31/2023 10.8  9.6 - 11.7 Seconds Final    INR 05/31/2023 1.01  0.93 - 1.10 Final    Hemoglobin A1C 05/31/2023 5.10  4.80 - 5.60 % Final    Color, UA 05/31/2023 Yellow  Yellow, Straw Final    Appearance, UA 05/31/2023 Clear  Clear Final    pH, UA 05/31/2023 6.0  5.0 - 8.0 Final    Specific Gravity, UA 05/31/2023 1.019  1.005 - 1.030 Final    Glucose, UA 05/31/2023 Negative  Negative Final    Ketones, UA 05/31/2023 Negative  Negative Final    Bilirubin, UA 05/31/2023 Negative  Negative Final    Blood, UA 05/31/2023 Moderate (2+) (A)  Negative Final    Protein, UA 05/31/2023 Negative  Negative Final    Leuk Esterase, UA 05/31/2023 Trace (A)  Negative Final    Nitrite, UA 05/31/2023 Negative  Negative Final    Urobilinogen, UA 05/31/2023 0.2 E.U./dL  0.2 - 1.0 E.U./dL Final    RBC, UA 05/31/2023 0-2  None Seen, 0-2 /HPF Final    WBC, UA 05/31/2023 3-5 (A)  None Seen, 0-2 /HPF Final    Bacteria, UA 05/31/2023 Trace (A)  None Seen /HPF Final    Squamous Epithelial Cells, UA 05/31/2023 0-2  None Seen, 0-2 /HPF Final    Hyaline Casts, UA 05/31/2023 None Seen  None Seen /LPF Final    Methodology 05/31/2023 Manual Light Microscopy   Final    HCG, Urine QL 06/06/2023 Negative  Negative Final     No results found.    Discharge and Follow up Instructions:   Discharge Instructions:       1. Patient is to continue with physical therapy exercises BID and continue working with the physical therapist as ordered.  2.  Continue to follow  precautions as instructed.   3.  Patient may weight bear as tolerated.   4.  Continue ice regularly. Patient instructed on frequent calf pumping exercises.    5.  Patient also instructed on incentive spirometer during hospitalization and encouraged to continue to use at home regularly.   6.  Patient is instructed to continue DVT prophylaxis.  7.  The dressing should be left in place until seen in the office.  The suction unit stops functioning (typically 7 days) and battery may be removed, but leave dressing intact.   The dressing is waterproof and the patient may shower POD #3. If the dressing becomes disloged or saturated it should be changed. Please refer to the [facility] information sheet if you have any questions about the dressing.  If you have a home health nurse or therapist they can be contacted to assist with dressing change or repair. You may also call the [facility] dressing hotline for questions related to the dressing ([phone]). If there still other problems or questions related to the dressing despite these measures then you can contact the office.    8.  Follow up appointment in 2 weeks - patient to call the office at [phone] for Michael office and [phone] for [place] office to schedule       Date: 6/6/2023    Siddhartha Pretty MD      Time: Discharge 20 min     DICTATED UTILIZING DRAGON DICTATION

## 2023-06-06 NOTE — ANESTHESIA PROCEDURE NOTES
Airway  Urgency: elective    Date/Time: 6/6/2023 7:43 AM  Airway not difficult    General Information and Staff    Patient location during procedure: OR  CRNA/CAA: Rica Abrams, CRNA    Indications and Patient Condition  Indications for airway management: airway protection    Preoxygenated: yes  Mask difficulty assessment: 2 - vent by mask + OA or adjuvant +/- NMBA    Final Airway Details  Final airway type: endotracheal airway      Successful airway: ETT  Cuffed: yes   Successful intubation technique: direct laryngoscopy  Facilitating devices/methods: intubating stylet and anterior pressure/BURP  Endotracheal tube insertion site: oral  Blade: Sylvie  Blade size: 3  Cormack-Lehane Classification: grade I - full view of glottis  Placement verified by: chest auscultation and capnometry   Cuff volume (mL): 8  Measured from: lips  ETT/EBT  to lips (cm): 19  Number of attempts at approach: 1    Additional Comments  Atraumatic placement of ETT, dentition unchanged from preop.

## 2023-06-06 NOTE — ANESTHESIA PROCEDURE NOTES
Peripheral Block    Pre-sedation assessment completed: 6/6/2023 7:00 AM    Patient reassessed immediately prior to procedure    Patient location during procedure: pre-op  Reason for block: procedure for pain, at surgeon's request, post-op pain management and secondary anesthetic  Performed by  Anesthesiologist: Nikolay Cox MD  Preanesthetic Checklist  Completed: patient identified, IV checked, site marked, risks and benefits discussed, surgical consent, monitors and equipment checked, pre-op evaluation and timeout performed  Prep:  Pt Position: sitting  Sterile barriers:cap, gloves, mask and washed/disinfected hands  Prep: ChloraPrep  Patient monitoring: blood pressure monitoring, continuous pulse oximetry and EKG  Procedure    Sedation: yes  Performed under: local infiltration  Guidance:ultrasound guided and landmark technique    ULTRASOUND INTERPRETATION.  Using ultrasound guidance a 20 G gauge needle was placed in close proximity to the femoral nerve, at which point, under ultrasound guidance anesthetic was injected in the area of the nerve and spread of the anesthesia was seen on ultrasound in close proximity thereto.  There were no abnormalities seen on ultrasound; a digital image was taken; and the patient tolerated the procedure with no complications. Images:still images obtained, printed/placed on chart    Laterality:right  Block Type:adductor canal block  Injection Technique:single-shot  Needle Type:echogenic  Needle Gauge:20 G  Resistance on Injection: less than 15 psi    Medications Used: dexamethasone (DECADRON) injection - Injection   4 mg - 6/6/2023 7:25:00 AM  ropivacaine (NAROPIN) 0.5 % injection - Injection   30 mL - 6/6/2023 7:25:00 AM      Post Assessment  Injection Assessment: negative aspiration for heme, no paresthesia on injection and incremental injection  Patient Tolerance:comfortable throughout block  Complications:no  Additional Notes  Pre-procedure:  Peripheral nerve block  performed preoperatively prior to the start of anesthesia time at the request of the patient and the surgeon for the management of postoperative acute surgical pain as well as for a secondary intraoperative anesthetic (general anesthesia is the primary intraoperative anesthetic); patient identified; pre-procedure vital signs, all relevant labs/studies, complete medical/surgical/anesthetic history, full medication list, full allergy list, and NPO status obtained/reviewed; physical assessment performed; anesthetic options, side effects, potential complications, risks, and benefits discussed; questions answered; patient wishes to proceed with the procedure; written anesthesia procedure consent obtained; patient cleared for procedure; time out performed; IV access in situ    Procedure:  ASA monitor placed; supplemental oxygen provided; patient positioned; hand hygiene performed; sterile technique maintained throughout the procedure; sterile prep applied; insertion site determined by anatomical landmarks, palpation, and ultrasound imaging; live ultrasound guidance throughout the procedure; target nerves/landmarks identified on live ultrasound; skin and subcutaneous tissues numbed by injection of 1% lidocaine; 4 inch 20G [employer] Ultra 360 Insulated Echogenic Needle used; realtime needle advancement and placement near the target nerves witnessed on live ultrasound; negative aspiration prior to injection; correct needle placement confirmed on live ultrasound by local anesthetic spread around the target nerves; local anesthetic mixture injected with negative aspiration prior to each injection and after each 1-5 mL injected; needle withdrawn; dressing applied; ultrasound image printed and placed in the patient's permanent medical record    Post-procedure:  Peripheral nerve block placed successfully; good block; no apparent complications; minimal estimated blood loss; vital signs stable throughout; see nurse's notes for  vitals; transported to the OR, general anesthesia induced, and surgery started

## 2023-06-06 NOTE — DISCHARGE PLACEMENT REQUEST
"Deanne Peralta (42 y.o. Female)       Date of Birth   1980    Social Security Number       Address   [address] [place] [address]    Home Phone   [phone]    MRN   [id number]       Mormon   Non-Mandaeism    Marital Status                               Admission Date   6/6/23    Admission Type   Elective    Admitting Provider   Siddhartha Pretty MD    Attending Provider   Siddhartha Pretty MD    Department, Room/Bed   [facility] [place] MAIN OR, [facility] MAIN OR/MAIN OR       Discharge Date       Discharge Disposition       Discharge Destination                                 Attending Provider: Siddhartha Pretty MD    Allergies: Lisinopril    Isolation: None   Infection: None   Code Status: Not on file    Ht: 154.9 cm (61\")   Wt: 91.4 kg (201 lb 9.6 oz)    Admission Cmt: None   Principal Problem: Primary osteoarthritis of both knees [M17.0]                   Active Insurance as of 6/6/2023       Primary Coverage       Payor Plan Insurance Group Employer/Plan Group    formerly [facility] formerly [facility] BLUE [facility] PPO [id number]       Payor Plan Address Payor Plan Phone Number Payor Plan Fax Number Effective Dates    [address] [phone]  5/15/2022 - None Entered    [facility] [id number]         Subscriber Name Subscriber Birth Date Member ID       DEANNE PERALTA 1980 [id number]                     Emergency Contacts        (Rel.) Home Phone Work Phone Mobile Phone    VAL BERMEO (Significant Other) [phone] -- --                "

## 2023-06-06 NOTE — H&P
History & Physical       Patient: Aida Peralta    Date of Admission: 6/6/2023  5:39 AM    YOB: 1980    Medical Record Number: [id number]    Attending Physician: Siddhartha Pretty MD        Chief Complaints: Primary osteoarthritis of both knees [M17.0]      History of Present Illness: 42 y.o. female presents with Primary osteoarthritis of both knees [M17.0]. Onset of symptoms was gradual and slowly progressive over the past 5 years or so.  Symptoms are associated with pain and discomfort on the medial aspect of the knee with radiation to the proximal tibia deep flexion and extension of the knee and walking on uneven surfaces.  Symptoms are aggravated by using a supportive brace and anti-inflammatory medication.   Symptoms improve with physical therapy and intra-articular injections. Patient is now being admitted to the services of Siddhartha Pretty MD for further evaluation and treatment.        Allergies   Allergen Reactions    Lisinopril Itching         Home Medications:  Medications Prior to Admission   Medication Sig Dispense Refill Last Dose    buPROPion XL (WELLBUTRIN XL) 300 MG 24 hr tablet 1 tablet Daily.   6/5/2023    pantoprazole (PROTONIX) 40 MG EC tablet Take 1 tablet by mouth Daily.   6/5/2023    triamterene-hydrochlorothiazide (DYAZIDE) 37.5-25 MG per capsule Take 1 capsule by mouth Daily.   6/5/2023    acetaminophen-codeine (TYLENOL/CODEINE #3) 300-30 MG per tablet Take 1-2 tablets by mouth Every 6 (Six) Hours As Needed for Moderate Pain. (Patient not taking: Reported on 5/31/2023) 20 tablet 0        Current Medications:  Scheduled Meds:ceFAZolin, 2 g, Intravenous, Once  sodium chloride, 10 mL, Intravenous, Q12H  tranexamic acid, 1,000 mg, Intravenous, Once  tranexamic acid, 1,000 mg, Intravenous, Once      Continuous Infusions:lactated ringers, 9 mL/hr, Last Rate: 9 mL/hr (06/06/23 0629)      PRN Meds:.  lactated ringers    sodium chloride       Past Medical History:   Diagnosis Date    CTS  (carpal tunnel syndrome) 2012    GERD (gastroesophageal reflux disease)     Hypertension     Migraines     Plica of knee, left 10/22/2021    Primary osteoarthritis of both knees 10/22/2021    Sleep apnea     no machine        Past Surgical History:   Procedure Laterality Date    CHOLECYSTECTOMY      TUBAL ABDOMINAL LIGATION          Social History     Occupational History    Not on file   Tobacco Use    Smoking status: Never    Smokeless tobacco: Never   Vaping Use    Vaping Use: Never used   Substance and Sexual Activity    Alcohol use: Yes     Alcohol/week: 2.0 standard drinks     Types: 2 Glasses of wine per week    Drug use: Never    Sexual activity: Yes     Partners: Male     Birth control/protection: Tubal ligation      Social History     Social History Narrative    Not on file        Family History   Problem Relation Age of Onset    Cancer Father          Review of Systems:   HEENT: Patient denies any headaches, vision changes, change in hearing, or tinnitus, Patient denies any rhinorrhea,epistaxis, sinus pain, mouth or dental problems, sore throat or hoarseness, or dysphagia  Pulmonary: Patient denies any cough, congestion, SOA, or wheezing  Cardiovascular: Patient denies any chest pain, dyspnea, palpitations, weakness, intolerance of exercise, varicosities, swelling of extremities, known murmur  Gastrointestinal:  Patient denies nausea, vomiting, diarrhea, constipation, loss  of appetite, change in appetite, dysphagia, gas, heartburn, melena, change in bowel habits, use of laxatives or other drugs to alter the function of the gastrointestinal tract.  Genital/Urinary: Patient denies dysuria, change in color of urine, change in frequency of urination, pain with urgency, incontinence, retention, or nocturia.  Musculoskeletal: Patient denies increased warmth; redness; or swelling of joints; limitation of function; deformity; crepitation: pain in a joint or an extremity, the neck, or the back, especially with  "movement.  Neurological: Patient denies dizziness, tremor, ataxia, difficulty in speaking, change in speech, paresthesia, loss of sensation, seizures, syncope, changes in memory.  Endocrine system: Patient denies tremors, palpitations, intolerance of heat or cold, polyuria, polydipsia, polyphagia, diaphoresis, exophthalmos, or goiter.  Psychological: Patient denies thoughts/plans or harming self or other; depression,  insomnia, night terrors, narendra, memory loss, disorientation.  Skin: Patient denies any bruising, rashes, discoloration, pruritus, wounds, ulcers, decubiti, changes in the hair or nails  Hematopoietic: Patient denies history of spontaneous or excessive bleeding, epistaxis, hematuria, melena, fatigue, enlarged or tender lymph nodes, pallor, history of anemia.    Physical Exam: 42 y.o. female  Vitals:    06/06/23 0626 06/06/23 0725   BP: 146/88 143/95  Comment: dr. becah at bedside, elevated bp ankowledged.   Pulse: 77 81   Resp: 18 14   Temp: 98.6 °F (37 °C)    SpO2: 97% 100%   Weight: 91.4 kg (201 lb 9.6 oz)    Height: 154.9 cm (61\")        General Appearance:          Alert, cooperative, in no acute distress                                                 Head:    Normocephalic, without obvious abnormality, atraumatic   Eyes:            Lids and lashes normal, conjunctivae and sclerae normal, no   icterus, no pallor, corneas clear, PERRLA   Ears:    Ears appear intact with no abnormalities noted   Throat:   No oral lesions, no thrush, oral mucosa moist   Neck:   No adenopathy, supple, trachea midline, no thyromegaly, no   carotid bruit, no JVD   Back:     No kyphosis present, no scoliosis present, no skin lesions,      erythema or scars, no tenderness to percussion or                   palpation,   range of motion normal   Lungs:     Clear to auscultation,respirations regular, even and                  unlabored    Heart:    Regular rhythm and normal rate, normal S1 and S2, no            murmur, no " gallop, no rub, no click   Chest Wall:    No abnormalities observed   Abdomen:     Normal bowel sounds, no masses, no organomegaly, soft        nontender, nondistended, no guarding, no rebound                tenderness   Rectal:     Deferred   Extremities:   Tenderness over medial aspect of the right knee with radiation to the proximal tibia. Moves all extremities well, no edema,   no cyanosis, no redness   Pulses:   Pulses palpable and equal bilaterally   Skin:   No bleeding, bruising or rash   Lymph nodes:   No palpable adenopathy   Neurologic:   Cranial nerves 2 - 12 grossly intact, sensation intact, DTR       present and equal bilaterally      Right knee. Patient has crepitus throughout range of motion. Positive patellar grind test. Mild effusion. Lachman is negative. Pivot shift is negative. Anterior and posterior drawer signs are negative. Significant joint line tenderness is noted on the medial aspect of the knee. Patient has a varus orientation of the knee. There is fullness and tenderness in the popliteal fossa. Mild distention of a popliteal cyst is noted in this location. Range of motion in flexion is from 0-110 degrees. Neurovascular status is intact.  Dorsalis pedis and posterior tibial artery pulses are palpable. Common peroneal nerve function is well preserved. Patient's gait is cautious and antalgic. Skin and soft tissues are mildly swollen, consistent with synovitis and effusion. The patient has a significant limp with the first few steps after starting the gait cycle. Getting out of a chair takes a lot of effort due to pain on knee flexion.      Diagnostic Tests:  Admission on 06/06/2023   Component Date Value Ref Range Status    QT Interval 05/31/2023 355  ms Preliminary    ABO Type 05/31/2023 B   Final    RH type 05/31/2023 Negative   Final    Antibody Screen 05/31/2023 Negative   Final    T&S Expiration Date 05/31/2023 6/8/2023 11:59:00 PM   Final    Protime 05/31/2023 10.8  9.6 - 11.7 Seconds  Final    INR 05/31/2023 1.01  0.93 - 1.10 Final    Hemoglobin A1C 05/31/2023 5.10  4.80 - 5.60 % Final    Color, UA 05/31/2023 Yellow  Yellow, Straw Final    Appearance, UA 05/31/2023 Clear  Clear Final    pH, UA 05/31/2023 6.0  5.0 - 8.0 Final    Specific Gravity, UA 05/31/2023 1.019  1.005 - 1.030 Final    Glucose, UA 05/31/2023 Negative  Negative Final    Ketones, UA 05/31/2023 Negative  Negative Final    Bilirubin, UA 05/31/2023 Negative  Negative Final    Blood, UA 05/31/2023 Moderate (2+) (A)  Negative Final    Protein, UA 05/31/2023 Negative  Negative Final    Leuk Esterase, UA 05/31/2023 Trace (A)  Negative Final    Nitrite, UA 05/31/2023 Negative  Negative Final    Urobilinogen, UA 05/31/2023 0.2 E.U./dL  0.2 - 1.0 E.U./dL Final    RBC, UA 05/31/2023 0-2  None Seen, 0-2 /HPF Final    WBC, UA 05/31/2023 3-5 (A)  None Seen, 0-2 /HPF Final    Bacteria, UA 05/31/2023 Trace (A)  None Seen /HPF Final    Squamous Epithelial Cells, UA 05/31/2023 0-2  None Seen, 0-2 /HPF Final    Hyaline Casts, UA 05/31/2023 None Seen  None Seen /LPF Final    Methodology 05/31/2023 Manual Light Microscopy   Final    HCG, Urine QL 06/06/2023 Negative  Negative Final     No results found.      Assessment:    Primary osteoarthritis of both knees        Plan:  The patient voiced understanding of the risks, benefits, and alternative forms of treatment that were discussed and the patient consents to proceed with right knee partial medial compartment knee replacement.     The patient was seen today for preoperative discussion.  The patient has been tried on over-the-counter and prescription NSAID's despite the risks of anti-inflammatory bleeding, peptic ulcers and erosive gastritis with short term benefit only.  Braces have been prescribed for mechanical support.  Patient has been participating in an exercise program specifically targeting joint pain relief with limited benefit. Intraarticular injections have been used periodically with  some but not complete relief of pain.  Ambulation aids have also been utilized.      The details of the surgical procedure were explained including the location of probable incisions and a description of the likely hardware/grafts to be used. The patient understands the likely convalescence after surgery as well as the rehabilitation required.  Also, we have thoroughly discussed with the patient the risks, benefits and alternatives to surgery.  Risks include but are not limited to the risk of infection, joint stiffness, limited range of motion, wound healing problems, scar tissue build up, myocardial infarction, stroke, blood clots (including DVT and/or pulmonary embolus along with the risk of death) neurologic and/or vascular injury, limb length discrepancy, fracture, dislocation, nonunion, malunion, continued pain and need for further surgery including hardware failure requiring revision.   Discharge Plan: today to home and home health      Date: 6/6/2023    Siddhartha Pretty MD      DICTATED UTILIZING DRAGON DICTATION

## 2023-06-06 NOTE — OP NOTE
TOTAL KNEE ARTHROPLASTY OPERATIVE     PATIENT NAME:Aida Peralta     YOB: 1980     ATTENDING PHYSICIAN: Siddhartha Pretty MD     DATE OF PROCEDURE: 6/6/2023     PREOPERATIVE DIAGNOSIS: Severe degenerative osteoarthritis, medial compartment of right knee.    POSTOPERATIVE DIAGNOSIS: Post-Op Diagnosis Codes:     * Primary osteoarthritis of both knees [M17.0]    PRINCIPAL DIAGNOSIS: Severe degenerative osteoarthritis medial compartment right knee.    PROCEDURE: Right medial compartmental partial knee arthroplasty.    Surgical Approach: Knee Mid-Vastus     SURGEON:  Siddhartha Pretty M.D.    ASSISTANT: first Wagner assistant was responsible for performing the following activities: Retraction, Suction, Irrigation, Suturing, Closing, and Placing Dressing and their skilled assistance was necessary for the success of this case.       ANESTHESIOLOGIST: Dr. Nikolay Cox MD    ANESTHESIA: Adductor canal block for postop pain control followed by general anesthesia.    POSITION: Supine on the operating table.    DRAINS: None.    COMPLICATIONS: None.    ESTIMATED BLOOD LOSS: 100ml    IMPLANT USED: [employer] partial P PK knee replacement system, #5 partial medial compartment femoral component, P PK  femoral component, number E partial medial compartment tibia with a 9 mm thick, fixed-bearing vitamin E impregnated polyethylene insert,  anchored into position using Refobicin antibiotic impregnated bone cement.     SPECIMENS:   Order Name Source Comment Collection Info Order Time   PREGNANCY, URINE Urine, Clean Catch  Collected By: Rachel Sequeira RN 6/6/2023  5:44 AM     Release to patient   Routine Release          Please note: We had discussed with the patient preoperatively that most of her disease appeared to be in the medial compartment of the knee.  Plus she is only 42 years of age and therefore was not comfortable performing a total knee arthroplasty with the patient.  She has had severe symptoms for  at least 2 years with a very negative quality of life.  Intraoperative findings included an intact patella and an intact lateral compartment and the entire damage was on the medial compartment of the knee.  Accordingly, we went ahead with a medial compartmental partial knee arthroplasty using P PK implants from [employer].      INDICATION: The patient has been having very significant pain and discomfort in the knee.  We had scheduled the patient for total knee replacement after all forms of conservative nonoperative care had failed to provide adequate relief of symptoms.  Patient understood all the risks and benefits which were discussed in great detail including the possibility of death, infection, myocardial infarction, DVT, pulmonary embolism, stiffness of the knee, wound infection and breakdown, possibility of revision surgery, neurovascular compromise, pneumonia, pulmonary embolism      DETAILS OF PROCEDURE: Surgical timeout was called. Operative extremity was correctly identified in the operating room suite. Patient was placed under appropriate anesthetic.  Patient was administered IV antibiotics per SCIP protocol and hospital policy. The patient’s operative extremity was correctly identified in the operating room suite.A Tourniquet was applied after the leg has been exsanguinated. The correctly identified extremity was prepped and draped in a standard fashion.      An anterior approach was performed and a quad sparing, subvastus approach was carried out. The patellofemoral ligament was resected. Medial soft tissue release was carried out on the medial face of the tibia to balance the soft tissues and to release the contracture of the knee MCL. Tibial Osteophytes were resected. Severe bone-on-bone appearance was noted.  A thorough synovectomy was carried out. The Synovium was thickened and hypertrophic. The ACL, PCL and MCL were carefully protected throughout the entire procedure. The distal femur was  mapped. A 10 mm thick cut was made off the distal femur. A measuring guide was used and #5 medial compartment femoral component jig was found to be the best fit. Anterior, posterior and chamfer cuts were created. Care was taken to prevent creating a distal femoral notch. The proximal tibia was then mapped with navigation. 4 mm of the bone was resected off the medial tibial plateau.  An appropriate tibial slope was built into the cut to match the normal anatomy.  A number E tibia was found to be the best fit resting nicely on the cortical margins of the proximally resected metaphysis of the proximal tibia.  A trial reduction was carried out. Rotation and orientation of the components were carefully marked. Flexion and extension gaps were checked and found to be symmetric. The stability of the components was checked in full extension, mid- flexion and full flexion.The patella was everted, it was evaluated and found to be intact.  There was no chondromalacia on either the medial or the lateral facets.. Patellar Tracking was excellent. A Lateral release was not deemed necessary. Femoral lug holes were drilled. The Proximal Tibia was die punched. The posterior capsular structures and the subperiosteal ligamentous insertions were infiltrated with Exparel as a local anesthetic.    The cancellous bone was lavaged with a Pulse lavage  and dried.  We also used diluted Betadine as an antiseptic rinse solution.  Cement was mixed on the back table. Components were cemented into position sequentially, starting with the number E partial tibial component and going to the #5 partial medial compartmental P PK implant femur component. The excess amounts of bone cement were removed from the bone-metal interface. Trial reduction was carried out once the cement was fully cured. A 9 mm tibial polyethylene insert, fixed-bearing, constrained design insert, was then locked into position on the tibial tray. Wound was lavaged with  antibiotic containing irrigating solution. Tourniquet was deflated, hemostasis achieved. An analgesic cocktail was injected intra-articularly into the joint capsule and ligamentous insertions on bone for post op pain relief. The arthrotomy was repaired in 60 degrees of flexion. Subcutaneous sutures were applied. Occlusive dressing was applied. No complications were encountered. Sponge count and needle count were correct. The patient was reversed from anesthesia and taken from the operating room to the recovery room in stable condition. I discussed the satisfactory performance of this procedure with the patient’s family and answered all questions for them.       Siddhartha Pretty MD  6/6/2023  09:57 EDT

## 2023-06-07 ENCOUNTER — HOME CARE VISIT (OUTPATIENT)
Dept: HOME HEALTH SERVICES | Facility: HOME HEALTHCARE | Age: 43
End: 2023-06-07
Payer: COMMERCIAL

## 2023-06-07 VITALS
HEART RATE: 82 BPM | DIASTOLIC BLOOD PRESSURE: 92 MMHG | SYSTOLIC BLOOD PRESSURE: 140 MMHG | TEMPERATURE: 98 F | OXYGEN SATURATION: 98 % | RESPIRATION RATE: 18 BRPM

## 2023-06-07 LAB — QT INTERVAL: 355 MS

## 2023-06-07 PROCEDURE — G0151 HHCP-SERV OF PT,EA 15 MIN: HCPCS

## 2023-06-07 NOTE — Clinical Note
Greetings,     The PT start of care was performed 6/7/23.  Here is a brief summary of my visit.    CAREGIVER:  Mother- Cesar Soriano during the day & spouse at night    HOME ENVIRONMENT:  Lives with supportive spouse, 3 children & large rambunctious dog in single story home with ramp to enter.      Pt is s/p (R) PARTIAL KNEE ARTHROPLASTY on 6/6/23.   There was scant drainage on BRISEIDA dressing covering (R) anterior knee incision.  There are no signs of infection & Eliezer's sign was negative.    Pt is ambulating with a rolling walker with antalgic gt pattern, maintaining full (R) knee extension through all phases of gait.     Reviewed all medications, surgical aftercare of knee arthroplasty & home exercise program to be performed twice daily.    (R) KNEE ROM IN SITTING:  -30* to 50*    Plan to treat 2w1, 3w1.  Orders to be sent to your inbox for authorization if you agree.    **PLEASE NOTE- PATIENT WOULD LIKE TO ATTEND OUTPATIENT THERAPY AT [facility].  PLEASE SUBMIT AN ORDER IN [facility] SO AN APPOINTMENT CAN BE SCHEDULED**    Please feel free to contact me if you have any questions.  Thank you,         Alisa Hastings, PT        [phone]

## 2023-06-08 NOTE — HOME HEALTH
"___________________________________________________________________  PHYSICAL THERAPY KNEE ARTHROPLASTY EVALUATION    REASON FOR REFERRAL:  42 year old male admitted to [facility] on 6/6/23  for elective RIGHT PARTIAL (MEDIAL COMPARTMENT) KNEE ARTHROPLASTY by Dr. Pretty.  She was discharged home on the same date & referred for home health PT services for surgical aftercare of Right TKA, resulting in decreased range of motion and strength in Right knee, impeding functional mobility and gait activities, which prevents patient from safely exiting home for medical appointments.    Pt states she has not been set up for outpatient therapy.      PERTINENT PAST MEDICAL HISTORY:   Anxiety   CTS (carpal tunnel syndrome)   GERD (gastroesophageal reflux disease)   HTN  Migraines  OA (B) Knees  Sleep Apnea- No CPAP     PERTINENT PAST SURGICAL HISTORY:    CHOLECYSTECTOMY  TUBAL LIGATION    CAREGIVER:  Mother- Cesar Soriano during the day & spouse at night    HOME ENVIRONMENT:  Lives with supportive spouse, 3 children & large rambunctious dog in single story home with ramp to enter.      PRIOR LEVEL OF FUNCTION: Independent ambulation without asst device.  Independent ADL's I-ADL's.  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    PATIENT GOAL FOR THIS EPISODE OF CARE:  \"To walk without hurting\"    MENTAL STATUS:  Alert and oriented x 4    EDEMA: Moderate Right knee. +1 (R) lower leg/foot & ankle  Calf is soft & non-tender.  Eliezer's negative.    WOUND / SKIN CONDITION: Scant drainage on BRISEIDA dressing covering Right knee incision     SIGNS SYMPTOMS OF INFECTION:   None    POSTURE:  No gross deformities    MUSCLE TONE/COORDINATION:   WNL                                 TINETTI SCORE:  14/28  (TINETTI FALL RISK SCORING: Under 19= High  19-24= Moderate  25 & up= Low)    MEDICAL NECESSITY & FOCUS OF CARE FOR ONGOING SKILLED PHYSICAL THERAPY: Patient requires skilled physical therapy services for SURGICAL AFTERCARE OF PARTIAL KNEE ARTHROPLASTY for " management of lower extremity pain/edema and to address deficits in range of motion, strength and gait activities.  Patient is unable to negotiate stairs to safely exit home to attend medical appointments.    PLAN FOR NEXT VISIT:  Review post-surgical instructions, pain/edema management, progress HEP, strive to increase ROM & progressive gt training with reciprocal pattern.

## 2023-06-09 ENCOUNTER — TELEPHONE (OUTPATIENT)
Dept: ORTHOPEDIC SURGERY | Facility: CLINIC | Age: 43
End: 2023-06-09
Payer: COMMERCIAL

## 2023-06-09 ENCOUNTER — HOME CARE VISIT (OUTPATIENT)
Dept: HOME HEALTH SERVICES | Facility: HOME HEALTHCARE | Age: 43
End: 2023-06-09
Payer: COMMERCIAL

## 2023-06-09 DIAGNOSIS — Z96.651 STATUS POST RIGHT PARTIAL KNEE REPLACEMENT: Primary | ICD-10-CM

## 2023-06-09 PROCEDURE — G0151 HHCP-SERV OF PT,EA 15 MIN: HCPCS

## 2023-06-09 RX ORDER — OXYCODONE HYDROCHLORIDE AND ACETAMINOPHEN 5; 325 MG/1; MG/1
TABLET ORAL
Qty: 40 TABLET | Refills: 0 | Status: SHIPPED | OUTPATIENT
Start: 2023-06-09

## 2023-06-09 NOTE — HOME HEALTH
_____________________________________________  PHYSICAL THERAPY ROUTINE VISIT NOTE    SUBJECTIVE:  Patient states she's been getting dizzy, feeling hot & nauseated when she stands up.  She hasnt been able to work on any flexion at all due to severe pain.  She had her Percocet 5/325 refilled today & is taking 2 every 4 hrs.    MEDICATION CHANGES:  None    FALLS SINCE LAST VISIT:  None    MENTAL STATUS:  Alert and oriented x 4    EDEMA: Moderate Right knee. Calf is soft & non-tender.  Eliezer's negative.    WOUND / SKIN CONDITION: Scant drainage on BRISEIDA dressing covering Right knee incision.  No change since SOC.     SIGNS SYMPTOMS OF INFECTION:   None    POST TREATMENT ASSESSMENT:  Patient presents with significant orthostatic hypotension that is likely related to oxycodone.  BP dropped from 140/100 to 86/60 & eventually 76/40. Pt reports she is hydrating well. She has very poor pain relief with ROM exercises & was not able to flex knee past 50 degrees, which is what she had at SOC.  PT notified MD on call.  Patient instructed to continue with hydration, wear gt belt & have family members assist her with all ambulation for safety.  She is to focus on increasing (R) knee flexion.  Recommend she obtain a BP cuff to monitor her BP.  If pain & symptoms persist, she can call MD or go to an urgent care center or ED.  Pt, her mother & spouse verbalized understanding.    MEDICAL NECESSITY & FOCUS OF CARE FOR ONGOING SKILLED PHYSICAL THERAPY: Patient requires skilled physical therapy services for SURGICAL AFTERCARE OF PARTIAL KNEE ARTHROPLASTY for management of lower extremity pain/edema and to address deficits in range of motion, strength and gait activities.  Patient is unable to negotiate stairs to safely exit home to attend medical appointments.    PLAN FOR NEXT VISIT:  Monitor BP, Inquire about changing pain medication, progress HEP- strive to increase (R) Knee ROM, progress gt training.

## 2023-06-09 NOTE — TELEPHONE ENCOUNTER
Caller: DEANNE NAPOLES    Relationship: SELF     Best call back number: [phone]    Requested Prescriptions:     oxyCODONE-acetaminophen (PERCOCET) 5-325 MG per tablet       Pharmacy where request should be sent:  [facility]     Last office visit with prescribing clinician: 5/3/2023   Last telemedicine visit with prescribing clinician: Visit date not found   Next office visit with prescribing clinician: 6/21/2023     Additional details provided by patient:     Does the patient have less than a 3 day supply:  [x] Yes  [] No    Would you like a call back once the refill request has been completed: [x] Yes [] No    If the office needs to give you a call back, can they leave a voicemail: [x] Yes [] No    Mane Godoy   06/09/23 09:07 EDT

## 2023-06-10 ENCOUNTER — HOME CARE VISIT (OUTPATIENT)
Dept: HOME HEALTH SERVICES | Facility: HOME HEALTHCARE | Age: 43
End: 2023-06-10
Payer: COMMERCIAL

## 2023-06-10 VITALS
SYSTOLIC BLOOD PRESSURE: 76 MMHG | TEMPERATURE: 97.9 F | DIASTOLIC BLOOD PRESSURE: 40 MMHG | HEART RATE: 100 BPM | RESPIRATION RATE: 18 BRPM | OXYGEN SATURATION: 99 %

## 2023-06-12 ENCOUNTER — TELEPHONE (OUTPATIENT)
Dept: ORTHOPEDIC SURGERY | Facility: CLINIC | Age: 43
End: 2023-06-12
Payer: COMMERCIAL

## 2023-06-12 ENCOUNTER — HOME CARE VISIT (OUTPATIENT)
Dept: HOME HEALTH SERVICES | Facility: HOME HEALTHCARE | Age: 43
End: 2023-06-12
Payer: COMMERCIAL

## 2023-06-12 ENCOUNTER — HOSPITAL ENCOUNTER (OUTPATIENT)
Dept: ULTRASOUND IMAGING | Facility: HOSPITAL | Age: 43
Discharge: HOME | End: 2023-06-12
Admitting: ORTHOPAEDIC SURGERY
Payer: COMMERCIAL

## 2023-06-12 VITALS
DIASTOLIC BLOOD PRESSURE: 94 MMHG | HEART RATE: 91 BPM | SYSTOLIC BLOOD PRESSURE: 145 MMHG | RESPIRATION RATE: 18 BRPM | TEMPERATURE: 97.7 F | OXYGEN SATURATION: 97 %

## 2023-06-12 DIAGNOSIS — M79.89 PAIN AND SWELLING OF RIGHT LOWER EXTREMITY: Primary | ICD-10-CM

## 2023-06-12 DIAGNOSIS — M79.604 PAIN AND SWELLING OF RIGHT LOWER EXTREMITY: ICD-10-CM

## 2023-06-12 DIAGNOSIS — M79.89 PAIN AND SWELLING OF RIGHT LOWER EXTREMITY: ICD-10-CM

## 2023-06-12 DIAGNOSIS — M79.604 PAIN AND SWELLING OF RIGHT LOWER EXTREMITY: Primary | ICD-10-CM

## 2023-06-12 PROCEDURE — G0151 HHCP-SERV OF PT,EA 15 MIN: HCPCS

## 2023-06-12 PROCEDURE — 93971 EXTREMITY STUDY: CPT

## 2023-06-12 NOTE — HOME HEALTH
"Subjective:\"My calf has really been bugging me the last 2 days and is really sore.\" per patient    no new med changes  no recent falls    Skill/education provided: see interventions for details    Patient/caregiver response: see interventions for details    Assessment: R focal calf tenderness R anterior/medial calf, positive Eliezer's sign. Spoke with Dayanna at Dr. Pretty's office who scheduled patient for Doppler at [facility] to r/o DVT.    Plan for next visit: gait training, ther ex, HEP, ROM progression, fall prevention"

## 2023-06-12 NOTE — TELEPHONE ENCOUNTER
Received a call from the home physical therapist.  She was concerned that the patient was having increasing pain and swelling to her operative leg.  Primarily in the calf.  The therapist also reports she has a positive Homans' sign.  Will send the patient to [facility] for stat venous Doppler to rule out DVT

## 2023-06-14 ENCOUNTER — HOME CARE VISIT (OUTPATIENT)
Dept: HOME HEALTH SERVICES | Facility: HOME HEALTHCARE | Age: 43
End: 2023-06-14
Payer: COMMERCIAL

## 2023-06-14 VITALS
HEART RATE: 106 BPM | OXYGEN SATURATION: 98 % | SYSTOLIC BLOOD PRESSURE: 147 MMHG | RESPIRATION RATE: 18 BRPM | TEMPERATURE: 97.6 F | DIASTOLIC BLOOD PRESSURE: 96 MMHG

## 2023-06-14 DIAGNOSIS — M17.0 PRIMARY OSTEOARTHRITIS OF BOTH KNEES: ICD-10-CM

## 2023-06-14 PROCEDURE — G0151 HHCP-SERV OF PT,EA 15 MIN: HCPCS

## 2023-06-14 RX ORDER — OXYCODONE HYDROCHLORIDE AND ACETAMINOPHEN 5; 325 MG/1; MG/1
1-2 TABLET ORAL
Qty: 40 TABLET | Refills: 0 | Status: SHIPPED | OUTPATIENT
Start: 2023-06-14

## 2023-06-15 NOTE — HOME HEALTH
"Subjective:\"I can't really do the exercises because my calf hurts so bad.\" per patient  Patient's doppler results were negative for DVT    no new med changes  no recent falls    Skill/education provided: see interventions for details    Patient/caregiver response: see interventions for details    Assessment: patient continues with slow ROM progression due to poor follow through with HEP related to ongoing c/o R calf pain. Patient tends to keep R knee straight throughout all amb, transfers and requires moderate encouragement for any R knee flexion due to c/o pain. Therapist instructed patient in importance of early ROM progression and in being consistent with HEP    Plan for next visit: gait training, progress R knee flexion beyond 60 degrees, HEP progression"

## 2023-06-16 ENCOUNTER — HOME CARE VISIT (OUTPATIENT)
Dept: HOME HEALTH SERVICES | Facility: HOME HEALTHCARE | Age: 43
End: 2023-06-16
Payer: COMMERCIAL

## 2023-06-16 VITALS
RESPIRATION RATE: 18 BRPM | DIASTOLIC BLOOD PRESSURE: 83 MMHG | OXYGEN SATURATION: 95 % | SYSTOLIC BLOOD PRESSURE: 154 MMHG | TEMPERATURE: 97.6 F

## 2023-06-16 PROCEDURE — G0151 HHCP-SERV OF PT,EA 15 MIN: HCPCS

## 2023-06-16 NOTE — HOME HEALTH
Discharge Summary:    Patient was seen for PT discharge today due to PT goals met see interventions/goal status for details. Patient was seen for a total of 5 visits for decreased ability to transfer and amb related to recent R partial knee replacement for evaluation, gait training, transfer training, home safety, fall prevention, and pain/edema management. Currently patient is able to to amb IND/SUP with FWW, IND with transfers, IND with HEP with written handouts, IND with pain/edema management and fall prevention. Patient with low tolerance for ROM progression due to c/o calf pain. Patient agrees with PT discharge.    Home environment: lives with spouse who provides assist as needed. Has FWW    Follow up: with outpatient PT 6/19/23.

## 2023-06-22 ENCOUNTER — TELEPHONE (OUTPATIENT)
Dept: ORTHOPEDIC SURGERY | Facility: CLINIC | Age: 43
End: 2023-06-22

## 2023-06-22 NOTE — TELEPHONE ENCOUNTER
Caller: Aida Peralta    Relationship: Self    Best call back number:     What form or medical record are you requesting: FMLA FORM. THE PATIENT STATED SHE SENT IN A SECOND FORM ON 6/1 OR 6/2 TO BE FILLED OUT AND WHEN SHE WENT TO PICK IT UP SHE WAS GIVEN A COPY OF THE FIRST FORM SHE DROPPED OFF AGAIN.     Who is requesting this form or medical record from you: WORK    How would you like to receive the form or medical records (pick-up, mail, fax):     Timeframe paperwork needed: ASAP    Additional notes: PLEASE CALL THE PATIENT WHEN THE NEW FORM IS READY TO BE PICKED UP

## 2023-06-28 ENCOUNTER — TELEPHONE (OUTPATIENT)
Dept: ORTHOPEDIC SURGERY | Facility: CLINIC | Age: 43
End: 2023-06-28

## 2023-07-03 NOTE — TELEPHONE ENCOUNTER
Provider: LALA  Caller: DEANNE NAPOLES  Relationship to Patient: SELF  Phone Number: [phone]  Reason for Call: PATIENT IS TRYING TO GET IN TOUCH WITH ALEX WESLEY. SHE IS NEEDING HIS EMAIL FOR [facility] PAPER WORK. ON GOING TE.         PATIENT REQUESTING CALL BACK  PLEASE ADVISE

## 2023-07-05 PROBLEM — Z96.651 STATUS POST RIGHT PARTIAL KNEE REPLACEMENT: Status: ACTIVE | Noted: 2023-07-05

## 2023-07-24 DIAGNOSIS — Z96.651 STATUS POST RIGHT PARTIAL KNEE REPLACEMENT: ICD-10-CM

## 2023-07-25 ENCOUNTER — APPOINTMENT (OUTPATIENT)
Dept: PHYSICAL THERAPY | Facility: HOSPITAL | Age: 43
End: 2023-07-25
Payer: COMMERCIAL

## 2023-07-25 RX ORDER — OXYCODONE HYDROCHLORIDE AND ACETAMINOPHEN 5; 325 MG/1; MG/1
1 TABLET ORAL EVERY 4 HOURS PRN
Qty: 20 TABLET | Refills: 0 | Status: SHIPPED | OUTPATIENT
Start: 2023-07-25

## 2023-07-28 ENCOUNTER — APPOINTMENT (OUTPATIENT)
Dept: PHYSICAL THERAPY | Facility: HOSPITAL | Age: 43
End: 2023-07-28
Payer: COMMERCIAL

## 2023-08-01 ENCOUNTER — HOSPITAL ENCOUNTER (OUTPATIENT)
Dept: PHYSICAL THERAPY | Facility: HOSPITAL | Age: 43
Setting detail: THERAPIES SERIES
Discharge: HOME | End: 2023-08-01
Payer: COMMERCIAL

## 2023-08-01 DIAGNOSIS — Z96.651 S/P TOTAL KNEE ARTHROPLASTY, RIGHT: Primary | ICD-10-CM

## 2023-08-01 PROCEDURE — 97110 THERAPEUTIC EXERCISES: CPT

## 2023-08-01 PROCEDURE — 97140 MANUAL THERAPY 1/> REGIONS: CPT

## 2023-08-02 DIAGNOSIS — Z96.651 STATUS POST RIGHT PARTIAL KNEE REPLACEMENT: ICD-10-CM

## 2023-08-02 RX ORDER — OXYCODONE HYDROCHLORIDE AND ACETAMINOPHEN 5; 325 MG/1; MG/1
1 TABLET ORAL EVERY 6 HOURS PRN
Qty: 40 TABLET | Refills: 0 | Status: SHIPPED | OUTPATIENT
Start: 2023-08-02

## 2023-08-04 ENCOUNTER — HOSPITAL ENCOUNTER (OUTPATIENT)
Dept: PHYSICAL THERAPY | Facility: HOSPITAL | Age: 43
Setting detail: THERAPIES SERIES
Discharge: HOME | End: 2023-08-04
Payer: COMMERCIAL

## 2023-08-04 DIAGNOSIS — Z96.651 S/P TOTAL KNEE ARTHROPLASTY, RIGHT: Primary | ICD-10-CM

## 2023-08-04 PROCEDURE — 97140 MANUAL THERAPY 1/> REGIONS: CPT

## 2023-08-04 PROCEDURE — 97110 THERAPEUTIC EXERCISES: CPT

## 2023-08-08 ENCOUNTER — HOSPITAL ENCOUNTER (OUTPATIENT)
Dept: PHYSICAL THERAPY | Facility: HOSPITAL | Age: 43
Setting detail: THERAPIES SERIES
Discharge: HOME | End: 2023-08-08
Payer: COMMERCIAL

## 2023-08-08 DIAGNOSIS — Z96.651 S/P TOTAL KNEE ARTHROPLASTY, RIGHT: Primary | ICD-10-CM

## 2023-08-08 PROCEDURE — 97110 THERAPEUTIC EXERCISES: CPT

## 2023-08-08 PROCEDURE — 97140 MANUAL THERAPY 1/> REGIONS: CPT

## 2023-08-08 NOTE — THERAPY TREATMENT NOTE
Outpatient Physical Therapy Ortho Treatment Note   Jazz Bullock     Patient Name: Aida Peralta  : 1980  MRN: [id number]  Today's Date: 2023      Visit Date: 2023    Visit Dx:    ICD-10-CM ICD-9-CM   1. S/P total knee arthroplasty, right  Z96.651 V43.65       Patient Active Problem List   Diagnosis    YULIA (generalized anxiety disorder)    History of seizure    Hypertension    Hypothyroidism    Primary osteoarthritis of both knees    Morbid obesity    Plica of knee, left    Adjustment disorder    Status post right partial knee replacement        Past Medical History:   Diagnosis Date    CTS (carpal tunnel syndrome)     GERD (gastroesophageal reflux disease)     Hypertension     Migraines     Plica of knee, left 10/22/2021    Primary osteoarthritis of both knees 10/22/2021    Seizure     in 2018    Sleep apnea     no machine        Past Surgical History:   Procedure Laterality Date    CHOLECYSTECTOMY      KNEE ARTHROPLASTY, PARTIAL REPLACEMENT Right 2023    Procedure: KNEE ARTHROPLASTY, PARTIAL REPLACEMENT;  Surgeon: Siddhartha Pretty MD;  Location: [facility] MAIN OR;  Service: [employer] - Ortho;  Laterality: Right;    TUBAL ABDOMINAL LIGATION          PT Ortho       Row Name 23 1600       Subjective Pain    Pre-Treatment Pain Level 0  -LN       Right Lower Ext    Rt Knee Extension/Flexion AROM seated active knee flexion after stretching= 84 degrees; 0 degrees knee extension after stretching.  -LN    Rt Knee Extension/Flexion PROM 0-86 degrees after stretching  -LN              User Key  (r) = Recorded By, (t) = Taken By, (c) = Cosigned By      Initials Name Provider Type    LN Gloria Kan, PT Physical Therapist                                 PT Assessment/Plan       Row Name 23 1700          PT Assessment    Assessment Comments Pt tolerated treatment well today and able to increase weight with leg lifts by 1/2# today. Pt still with limited right knee flexion and she does  "not tolerate passive flexion stretching well; has a lot of pain with any passive stretching of flexion and she tenses up and extends her back and raises her right hip. Hard end-feel noted with passive knee flexion. Do not feel pt will be able to get much more knee flexion without having a knee manipulation; pt sees MD tomorrow.  -LN        PT Plan    PT Frequency 2x/week  -LN     PT Plan Comments Continue as MD orders; pt sees MD tomorrow.  -LN               User Key  (r) = Recorded By, (t) = Taken By, (c) = Cosigned By      Initials Name Provider Type    Gloria Wolf, PT Physical Therapist                     Modalities       Row Name 08/08/23 1600             Subjective Comments    Subjective Comments \"My knee is doing okay.\" \"It is still really stiff.\" \"I see the doctor tomorrow.\"  -LN         Moist Heat    MH Applied Yes  -LN      Location Anterior and Posterior R knee/leg with pt supine.  -LN      PT Moist Heat Minutes 12  -LN      MH S/P Rx Yes  -LN         Ice    Patient denies application of Ice Yes  -LN      Patient reports will apply ice at home to involved area Yes  -LN                User Key  (r) = Recorded By, (t) = Taken By, (c) = Cosigned By      Initials Name Provider Type    Gloria Wolf, PT Physical Therapist                   OP Exercises       Row Name 08/08/23 1700 08/08/23 1600          Subjective Comments    Subjective Comments -- \"My knee is doing okay.\" \"It is still really stiff.\" \"I see the doctor tomorrow.\"  -LN        Subjective Pain    Able to rate subjective pain? -- yes  -LN     Pre-Treatment Pain Level -- 0  -LN     Subjective Pain Comment -- No present pain reported.  -LN        Total Minutes    88302 - PT Therapeutic Exercise Minutes -- 45  -LN     78251 - PT Manual Therapy Minutes 15  -LN --        Exercise 1    Exercise Name 1 -- Heelslides with sheet  -LN     Cueing 1 -- Verbal;Tactile;Demo  -LN     Time 1 -- 10 minutes  -LN        Exercise 2    " Exercise Name 2 -- Wallslides  -LN     Cueing 2 -- Verbal;Tactile;Demo  -LN     Time 2 -- 10 minutes  -LN        Exercise 3    Exercise Name 3 -- Bike- Airdyne  seat 2  -LN     Cueing 3 -- Verbal  -LN     Time 3 -- 10 minutes  -LN        Exercise 4    Exercise Name 4 -- supine HS stretch with sheet  -LN     Cueing 4 -- Verbal;Tactile;Demo  -LN     Reps 4 -- 10  -LN     Time 4 -- 10 sec  -LN        Exercise 5    Exercise Name 5 -- QS over single towel roll  -LN     Cueing 5 -- Verbal;Tactile;Demo  -LN     Reps 5 -- 30  -LN     Time 5 -- 5 sec  -LN        Exercise 6    Exercise Name 6 -- NWB gastroc stretch with sheet  -LN     Cueing 6 -- Verbal;Tactile;Demo  -LN     Additional Comments -- HEP  -LN        Exercise 7    Exercise Name 7 -- SAQ  -LN     Cueing 7 -- Verbal;Tactile;Demo  -LN     Reps 7 -- 30  -LN     Time 7 -- 5 sec  -LN     Additional Comments -- 1.5#  -LN        Exercise 8    Exercise Name 8 -- SLR  -LN     Cueing 8 -- Verbal;Tactile;Demo  -LN     Reps 8 -- 30  -LN     Time 8 -- 2 sec  -LN     Additional Comments -- 1.5#  -LN        Exercise 9    Exercise Name 9 -- S/L hip abduction  -LN     Cueing 9 -- Verbal;Tactile;Demo  -LN     Reps 9 -- 30  -LN     Time 9 -- 2 sec  -LN     Additional Comments -- 1.5#  -LN        Exercise 10    Exercise Name 10 -- Heel prop  -LN     Cueing 10 -- Verbal;Tactile;Demo  -LN     Time 10 -- 5 minutes  -LN        Exercise 11    Exercise Name 11 -- sidelying hip adduction  -LN     Cueing 11 -- Verbal;Tactile;Demo  -LN     Reps 11 -- 30  -LN     Time 11 -- 2 sec  -LN     Additional Comments -- 1.5#  -LN        Exercise 12    Exercise Name 12 -- Standing calf raises  -LN     Cueing 12 -- Verbal;Tactile;Demo  -LN     Reps 12 -- 30  -LN        Exercise 13    Exercise Name 13 -- LAQ  -LN     Cueing 13 -- Verbal;Tactile;Demo  -LN     Reps 13 -- 30  -LN     Additional Comments -- 1.5#  -LN        Exercise 14    Exercise Name 14 -- standing HS curls  -LN     Cueing 14 --  Verbal;Tactile;Demo  -LN     Reps 14 -- 30  -LN     Time 14 -- 2-3 sec  -LN        Exercise 15    Exercise Name 15 -- Forward step ups- 6 inch step  -LN     Cueing 15 -- Verbal;Tactile;Demo  -LN     Reps 15 -- 30 each  -LN        Exercise 16    Exercise Name 16 -- Prone hip extension  -LN     Cueing 16 -- Verbal;Tactile;Demo  -LN     Reps 16 -- 30  -LN     Time 16 -- 2 sec  -LN     Additional Comments -- 1.5#  -LN        Exercise 17    Exercise Name 17 -- TKE vs TB  -LN     Cueing 17 -- Verbal;Tactile;Demo  -LN     Reps 17 -- 20  -LN     Time 17 -- 5 sec  -LN     Additional Comments -- Black TB  -LN        Exercise 18    Exercise Name 18 -- Lateral step up and over- 6 inch step  -LN     Cueing 18 -- Tactile;Demo;Verbal  -LN     Reps 18 -- 25  -LN               User Key  (r) = Recorded By, (t) = Taken By, (c) = Cosigned By      Initials Name Provider Type    Gloria Wolf, PT Physical Therapist                             Manual Rx (last 36 hours)       Manual Treatments       Row Name 08/08/23 1700             Total Minutes    54822 - PT Manual Therapy Minutes 15  -LN         Manual Rx 1    Manual Rx 1 Location Patellar mobs  -LN      Manual Rx 1 Duration x 10 each  -LN         Manual Rx 2    Manual Rx 2 Location Seated passive right knee flexion  -LN      Manual Rx 2 Duration 10 x 10 sec  -LN         Manual Rx 3    Manual Rx 3 Location Posterior tibial mobs  -LN      Manual Rx 3 Duration 3 x 5 osc  -LN                User Key  (r) = Recorded By, (t) = Taken By, (c) = Cosigned By      Initials Name Provider Type    Gloria Wolf, PT Physical Therapist                        Therapy Education  Given: HEP, Symptoms/condition management, Pain management  Program: Reinforced, Progressed  How Provided: Verbal, Demonstration  Provided to: Patient  Level of Understanding: Teach back education performed, Verbalized, Demonstrated              Time Calculation:   Start Time: 1625  Stop Time:  1805  Time Calculation (min): 100 min  Timed Charges  32035 - PT Therapeutic Exercise Minutes: 45  82145 - PT Manual Therapy Minutes: 15  Untimed Charges  PT Moist Heat Minutes: 12  Total Minutes  Timed Charges Total Minutes: 15  Untimed Charges Total Minutes: 12   Total Minutes: 15  Therapy Charges for Today       Code Description Service Date Service Provider Modifiers Qty    20897404435 HC PT THER PROC EA 15 MIN 8/8/2023 Gloria Kan, PT GP 3    20498603197 HC PT MANUAL THERAPY EA 15 MIN 8/8/2023 Gloria Kan, PT GP 1                      Gloria Kan, PT  8/8/2023

## 2023-08-09 ENCOUNTER — OFFICE VISIT (OUTPATIENT)
Dept: ORTHOPEDIC SURGERY | Facility: CLINIC | Age: 43
End: 2023-08-09
Payer: COMMERCIAL

## 2023-08-09 VITALS — RESPIRATION RATE: 20 BRPM | WEIGHT: 201 LBS | HEIGHT: 61 IN | BODY MASS INDEX: 37.95 KG/M2

## 2023-08-09 DIAGNOSIS — Z96.651 STATUS POST RIGHT PARTIAL KNEE REPLACEMENT: Primary | ICD-10-CM

## 2023-08-09 PROCEDURE — 99024 POSTOP FOLLOW-UP VISIT: CPT | Performed by: NURSE PRACTITIONER

## 2023-08-09 RX ORDER — DOXYCYCLINE HYCLATE 100 MG/1
CAPSULE ORAL
COMMUNITY
Start: 2023-07-24

## 2023-08-10 NOTE — PROGRESS NOTES
[facility]  Post Operative Visit      Patient Name: Aida Peralta  : 1980  Primary Care Physician: Aida Sheldon APRN        Chief Complaint:  S/P right knee partial arthroplasty with Dr. Pretty on 2023.    HPI:   Aida Peralta is a 42 y.o. year old who presents today for post operative evaluation.   She is doing relatively well.  At her last visit she had some concern for postoperative wound infection and was prescribed antibiotics.  Since that time she notes that the drainage has discontinued.  She denies any erythema or edema of the knee.  She denies any systemic fevers as well.  She is concerned that she is still having a relatively large amount of postoperative pain in the right knee.  She is also concerned about her degree of flexion with range of motion.  She is questioning if she needs manipulation under anesthesia.  She has not yet returned to work due to postoperative pain and limitations with range of motion.      Past Medical/Surgical, Social and Family History:  I have reviewed and/or updated pertinent history as noted in the medical record including:  Past Medical History:   Diagnosis Date    CTS (carpal tunnel syndrome)     GERD (gastroesophageal reflux disease)     Hypertension     Migraines     Plica of knee, left 10/22/2021    Primary osteoarthritis of both knees 10/22/2021    Seizure     in 2018    Sleep apnea     no machine     Past Surgical History:   Procedure Laterality Date    CHOLECYSTECTOMY      KNEE ARTHROPLASTY, PARTIAL REPLACEMENT Right 2023    Procedure: KNEE ARTHROPLASTY, PARTIAL REPLACEMENT;  Surgeon: Siddhartha Pretty MD;  Location: [facility] MAIN OR;  Service: Rob[employer] - Ortho;  Laterality: Right;    TUBAL ABDOMINAL LIGATION       Social History     Occupational History    Not on file   Tobacco Use    Smoking status: Never    Smokeless tobacco: Never   Vaping Use    Vaping Use: Never used   Substance and Sexual Activity    Alcohol use: Yes      Alcohol/week: 2.0 standard drinks     Types: 2 Glasses of wine per week    Drug use: Never    Sexual activity: Yes     Partners: Male     Birth control/protection: Tubal ligation      Social History     Social History Narrative    Not on file     Family History   Problem Relation Age of Onset    Cancer Father     Diabetes Father        Allergies:   Allergies   Allergen Reactions    Lisinopril Itching       Medications:   Home Medications:  Current Outpatient Medications on File Prior to Visit   Medication Sig    acetaminophen-codeine (TYLENOL/CODEINE #3) 300-30 MG per tablet Take 1-2 tablets by mouth Every 6 (Six) Hours As Needed for Moderate Pain.    apixaban (ELIQUIS) 2.5 MG tablet tablet Take 1 tablet by mouth 2 (Two) Times a Day.    buPROPion XL (WELLBUTRIN XL) 300 MG 24 hr tablet 1 tablet Daily. Indications: Anxiety disorder    busPIRone (BUSPAR) 15 MG tablet Take 0.5 tablets by mouth 3 (Three) Times a Day As Needed (anxiety). Indications: Anxiety Disorder    doxycycline (VIBRAMYCIN) 100 MG capsule     ondansetron (Zofran) 4 MG tablet Take 1 tablet by mouth Every 6 (Six) Hours As Needed for Nausea or Vomiting.    ondansetron (Zofran) 4 MG tablet Take 1 tablet by mouth Every 8 (Eight) Hours As Needed for Nausea or Vomiting.    oxyCODONE-acetaminophen (PERCOCET) 5-325 MG per tablet Take 1 tablet by mouth Every 6 (Six) Hours As Needed for Severe Pain.    pantoprazole (PROTONIX) 40 MG EC tablet Take 1 tablet by mouth Daily. Indications: Gastroesophageal Reflux Disease    triamterene-hydrochlorothiazide (DYAZIDE) 37.5-25 MG per capsule Take 1 capsule by mouth Daily. Indications: High Blood Pressure Disorder     No current facility-administered medications on file prior to visit.         ROS:  14 point review of systems was negative except as listed in the HPI     Physical Exam:   42 y.o. female  Body mass index is 37.98 kg/mý., 91.2 kg (201 lb)  Vitals:    08/09/23 1518   Resp: 20         General: Alert,  cooperative, appears well and in no observable distress.   HEENT: Normocephalic, atraumatic on external visual inspection. No icterus.   CV: No significant peripheral edema.   Respiratory: Normal respiratory effort.   Skin: Warm & well perfused; appropriate skin turgor.  Psych: Appropriate mood & affect.  Neuro: Gross sensation and motor intact in affected extremity/extremities.  Vascular: Peripheral pulses palpable in affected extremity/extremities. Calves/compartments soft and non tender, no evidence of DVT or compartment syndrome.    Right Knee Exam     Range of Motion   Flexion:  90     Other   Erythema: absent  Sensation: normal  Pulse: present  Swelling: mild  Effusion: no effusion present            Investigations  Physical therapy notes personally reviewed by me with flexion range of motion degrees as follows:   58 > 60 > 70 > 75 > 76 > 86          Assessment:  S/p right partial knee arthroplasty      Plan:  Continue physical therapy and ADLs as tolerated.  Educated on falls precautions.  Right knee ROM progressing over time and trending in + direction. No recommendation for manipulation at this time; continue to monitor progress closely.   Postoperative pain May continue Tylenol as needed.  Encouraged to call for refills as needed with Percocet.  Educated on dental/GYN/ procedures and pre-procedural antibiotic requirement/recommendation  Follow up in 4 weeks  She was encouraged to call the office should she have any questions or concerns in the interim.      HARRIET Singer

## 2023-08-11 ENCOUNTER — HOSPITAL ENCOUNTER (OUTPATIENT)
Dept: PHYSICAL THERAPY | Facility: HOSPITAL | Age: 43
Setting detail: THERAPIES SERIES
Discharge: HOME | End: 2023-08-11
Payer: COMMERCIAL

## 2023-08-11 DIAGNOSIS — Z96.651 S/P TOTAL KNEE ARTHROPLASTY, RIGHT: Primary | ICD-10-CM

## 2023-08-11 PROCEDURE — 97110 THERAPEUTIC EXERCISES: CPT

## 2023-08-11 PROCEDURE — 97140 MANUAL THERAPY 1/> REGIONS: CPT

## 2023-08-11 NOTE — THERAPY TREATMENT NOTE
"  Outpatient Physical Therapy Ortho Treatment Note   [facility]     Patient Name: Aida Peralta  : 1980  MRN: [id number]  Today's Date: 2023      Visit Date: 2023    Visit Dx:    ICD-10-CM ICD-9-CM   1. S/P total knee arthroplasty, right  Z96.651 V43.65       Patient Active Problem List   Diagnosis    YULIA (generalized anxiety disorder)    History of seizure    Hypertension    Hypothyroidism    Primary osteoarthritis of both knees    Morbid obesity    Plica of knee, left    Adjustment disorder    Status post right partial knee replacement        Past Medical History:   Diagnosis Date    CTS (carpal tunnel syndrome)     GERD (gastroesophageal reflux disease)     Hypertension     Migraines     Plica of knee, left 10/22/2021    Primary osteoarthritis of both knees 10/22/2021    Seizure     in 2018    Sleep apnea     no machine        Past Surgical History:   Procedure Laterality Date    CHOLECYSTECTOMY      KNEE ARTHROPLASTY, PARTIAL REPLACEMENT Right 2023    Procedure: KNEE ARTHROPLASTY, PARTIAL REPLACEMENT;  Surgeon: Siddhartha Pretty MD;  Location: [facility] OR;  Service: Robotics - Ortho;  Laterality: Right;    TUBAL ABDOMINAL LIGATION          PT Ortho       Row Name 23 1600       Subjective Comments    Subjective Comments \"My knee is a little sore today because I had my granddaughter last night and this morning.\" \"I saw the doctor and she said she wants me to wait on doing a manipulation at this point since my bending has improved some.\" \"I told her that when we did get the 86 degrees, that was you pushing my knee and I thought I was going to die.\" \"She did say that if I get where I can't stand it in the next 10 days to call her.\"  \"She at first said I could go back to work but then when I told her about my job she was more hesitant and did not release me to go back to work yet.\" \"She did release me to drive.\"  -LN       Subjective Pain    Able to rate subjective pain? yes  -LN    " "Pre-Treatment Pain Level 1  1-2/10  -LN    Subjective Pain Comment soreness  -LN              User Key  (r) = Recorded By, (t) = Taken By, (c) = Cosigned By      Initials Name Provider Type    Gloria Wolf, PT Physical Therapist                                 PT Assessment/Plan       Row Name 08/11/23 1700          PT Assessment    Assessment Comments Pt with c/o knee soreness but not having much pain. Pt tolerated treatment well today and had a little better tolerance to passive knee flexion, but still tends to tense up and extend her back and raise her right hip. She does seem to tolerate knee flexion exercises better after having MH on knee/leg. Decreased limp noted but still has decreased knee flexion. Minimal edema persists.  -LN        PT Plan    PT Frequency 1x/week  -LN     PT Plan Comments Continue per POC. Decrease to 1 x week secondary to pt has limited PT visits left from insurance. Progress as tolerated and continue to push knee flexion. MH PRN. Try contract/relax next visit for knee flexion as tolerated.  -LN               User Key  (r) = Recorded By, (t) = Taken By, (c) = Cosigned By      Initials Name Provider Type    Gloria Wolf, PT Physical Therapist                     Modalities       Row Name 08/11/23 1700             Moist Heat    MH Applied Yes  -LN      Location Anterior and Posterior R knee/leg with pt supine.  -LN      PT Moist Heat Minutes 12  -LN      MH S/P Rx Yes  -LN         Ice    Patient denies application of Ice Yes  -LN      Patient reports will apply ice at home to involved area Yes  -LN                User Key  (r) = Recorded By, (t) = Taken By, (c) = Cosigned By      Initials Name Provider Type    Gloria Wolf, PT Physical Therapist                   OP Exercises       Row Name 08/11/23 1600 08/11/23 1500          Subjective Comments    Subjective Comments \"My knee is a little sore today because I had my granddaughter last night and " "this morning.\" \"I saw the doctor and she said she wants me to wait on doing a manipulation at this point since my bending has improved some.\" \"I told her that when we did get the 86 degrees, that was you pushing my knee and I thought I was going to die.\" \"She did say that if I get where I can't stand it in the next 10 days ,to call her.\"  \"She at first said I could go back to work but then when I told her about my job she was more hesitant and did not release me to go back to work yet.\" \"She did release me to drive.\"  -LN --        Subjective Pain    Able to rate subjective pain? yes  -LN --     Pre-Treatment Pain Level 1  1-2/10  -LN --     Subjective Pain Comment soreness  -LN --        Total Minutes    50244 - PT Therapeutic Exercise Minutes 55  -LN --     75746 - PT Manual Therapy Minutes -- 17  -LN        Exercise 1    Exercise Name 1 Heelslides with sheet  -LN --     Cueing 1 Verbal;Tactile;Demo  -LN --     Time 1 10 minutes  -LN --        Exercise 2    Exercise Name 2 Wallslides  -LN --     Cueing 2 Verbal;Tactile;Demo  -LN --     Time 2 10 minutes  -LN --        Exercise 3    Exercise Name 3 Bike- Airdyne  seat 2  -LN --     Cueing 3 Verbal  -LN --     Time 3 10 minutes  -LN --        Exercise 4    Exercise Name 4 supine HS stretch with sheet  -LN --     Cueing 4 Verbal;Tactile;Demo  -LN --     Reps 4 10  -LN --     Time 4 10 sec  -LN --        Exercise 5    Exercise Name 5 QS over single towel roll  -LN --     Cueing 5 Verbal;Tactile;Demo  -LN --     Reps 5 30  -LN --     Time 5 5 sec  -LN --        Exercise 6    Exercise Name 6 NWB gastroc stretch with sheet  -LN --     Cueing 6 Verbal;Tactile;Demo  -LN --     Additional Comments HEP  -LN --        Exercise 7    Exercise Name 7 SAQ  -LN --     Cueing 7 Verbal;Tactile;Demo  -LN --     Reps 7 30  -LN --     Time 7 5 sec  -LN --     Additional Comments 1.5#  -LN --        Exercise 8    Exercise Name 8 SLR  -LN --     Cueing 8 Verbal;Tactile;Demo  -LN --     " Reps 8 30  -LN --     Time 8 2 sec  -LN --     Additional Comments 1.5#  -LN --        Exercise 9    Exercise Name 9 S/L hip abduction  -LN --     Cueing 9 Verbal;Tactile;Demo  -LN --     Reps 9 30  -LN --     Time 9 2 sec  -LN --     Additional Comments 1.5#  -LN --        Exercise 10    Exercise Name 10 Prone knee hang  -LN --     Cueing 10 Verbal;Tactile;Demo  -LN --     Time 10 4 minutes  -LN --        Exercise 11    Exercise Name 11 sidelying hip adduction  -LN --     Cueing 11 Verbal;Tactile;Demo  -LN --     Reps 11 30  -LN --     Time 11 2 sec  -LN --     Additional Comments 1.5#  -LN --        Exercise 12    Exercise Name 12 Standing calf raises  -LN --     Cueing 12 Verbal;Tactile;Demo  -LN --     Reps 12 30  -LN --        Exercise 13    Exercise Name 13 LAQ  -LN --     Cueing 13 Verbal;Tactile;Demo  -LN --     Reps 13 30  -LN --     Additional Comments 1.5#  -LN --        Exercise 14    Exercise Name 14 standing HS curls  -LN --     Cueing 14 Verbal;Tactile;Demo  -LN --     Reps 14 30  -LN --     Time 14 2-3 sec  -LN --        Exercise 15    Exercise Name 15 Forward step ups- 6 inch step  -LN --     Cueing 15 Verbal;Tactile;Demo  -LN --     Reps 15 30 each  -LN --        Exercise 16    Exercise Name 16 Prone hip extension  -LN --     Cueing 16 Verbal;Tactile;Demo  -LN --     Reps 16 30  -LN --     Time 16 2 sec  -LN --        Exercise 17    Exercise Name 17 TKE vs TB  -LN --     Cueing 17 Verbal;Tactile;Demo  -LN --     Reps 17 30  -LN --     Time 17 5 sec  -LN --     Additional Comments Black TB  -LN --        Exercise 18    Exercise Name 18 Lateral step up and over- 6 inch step  -LN --     Cueing 18 Tactile;Demo;Verbal  -LN --     Reps 18 30  -LN --        Exercise 19    Exercise Name 19 Step knee flexion stretch  -LN --     Cueing 19 Verbal;Tactile;Demo  -LN --     Reps 19 10  -LN --     Time 19 20 sec  -LN --               User Key  (r) = Recorded By, (t) = Taken By, (c) = Cosigned By      Initials  Name Provider Type    Gloria Wolf, PT Physical Therapist                             Manual Rx (last 36 hours)       Manual Treatments       Row Name 08/11/23 1500             Total Minutes    35950 - PT Manual Therapy Minutes 17  -LN         Manual Rx 1    Manual Rx 1 Location Patellar mobs  -LN      Manual Rx 1 Duration x 10 each  -LN         Manual Rx 2    Manual Rx 2 Location Seated passive right knee flexion  -LN      Manual Rx 2 Duration 10 x 10 sec  -LN         Manual Rx 3    Manual Rx 3 Location Posterior tibial mobs  -LN      Manual Rx 3 Duration 3 x 5 osc  -LN         Manual Rx 4    Manual Rx 4 Location Inferior patella glide  -LN      Manual Rx 4 Duration 5 x 10 sec  -LN                User Key  (r) = Recorded By, (t) = Taken By, (c) = Cosigned By      Initials Name Provider Type    Gloria Wolf, PT Physical Therapist                        Therapy Education  Education Details: Pt to add knee flexion stretch on step; prone knee hang; and knee flexion stretch in sitting with wall (static stretch) to HEP as tolerated. To use MH/CP PRN. Pt to continue with exercises in her pool and to try flexion stretch on step in pool on pool step.  Given: HEP, Symptoms/condition management, Pain management  Program: New, Reinforced, Progressed, Modified (added knee flexion stretch on step; prone knee hang; and knee flexion stretch in sitting with wall (static stretch))  How Provided: Verbal, Demonstration, Written  Provided to: Patient  Level of Understanding: Teach back education performed, Verbalized, Demonstrated              Time Calculation:   Start Time: 1603  Stop Time: 1750  Time Calculation (min): 107 min  Timed Charges  56609 - PT Therapeutic Exercise Minutes: 55  21178 - PT Manual Therapy Minutes: 17  Untimed Charges  PT Moist Heat Minutes: 12  Total Minutes  Timed Charges Total Minutes: 55  Untimed Charges Total Minutes: 12   Total Minutes: 12  Therapy Charges for Today       Code  Description Service Date Service Provider Modifiers Qty    93419106645 HC PT THER PROC EA 15 MIN 8/11/2023 Gloria Kan, PT GP 4    95428835915 HC PT MANUAL THERAPY EA 15 MIN 8/11/2023 Gloria Kan, PT GP 1                      Gloria Kan, PT  8/11/2023

## 2023-08-16 ENCOUNTER — HOSPITAL ENCOUNTER (OUTPATIENT)
Dept: PHYSICAL THERAPY | Facility: HOSPITAL | Age: 43
Setting detail: THERAPIES SERIES
Discharge: HOME | End: 2023-08-16
Payer: COMMERCIAL

## 2023-08-16 DIAGNOSIS — Z96.651 S/P TOTAL KNEE ARTHROPLASTY, RIGHT: Primary | ICD-10-CM

## 2023-08-16 PROCEDURE — 97140 MANUAL THERAPY 1/> REGIONS: CPT

## 2023-08-16 PROCEDURE — 97110 THERAPEUTIC EXERCISES: CPT

## 2023-08-16 NOTE — THERAPY TREATMENT NOTE
Outpatient Physical Therapy Ortho Treatment Note   [facility]     Patient Name: Aida Peralta  : 1980  MRN: [id number]  Today's Date: 2023      Visit Date: 2023    Visit Dx:    ICD-10-CM ICD-9-CM   1. S/P total knee arthroplasty, right  Z96.651 V43.65       Patient Active Problem List   Diagnosis    YULIA (generalized anxiety disorder)    History of seizure    Hypertension    Hypothyroidism    Primary osteoarthritis of both knees    Morbid obesity    Plica of knee, left    Adjustment disorder    Status post right partial knee replacement        Past Medical History:   Diagnosis Date    CTS (carpal tunnel syndrome)     GERD (gastroesophageal reflux disease)     Hypertension     Migraines     Plica of knee, left 10/22/2021    Primary osteoarthritis of both knees 10/22/2021    Seizure     in 2018    Sleep apnea     no machine        Past Surgical History:   Procedure Laterality Date    CHOLECYSTECTOMY      KNEE ARTHROPLASTY, PARTIAL REPLACEMENT Right 2023    Procedure: KNEE ARTHROPLASTY, PARTIAL REPLACEMENT;  Surgeon: Siddhartha Pretty MD;  Location: [facility] OR;  Service: Robotics - Ortho;  Laterality: Right;    TUBAL ABDOMINAL LIGATION          PT Ortho       Row Name 23 1100       Subjective Comments    Subjective Comments pt denies anything new since last session  -              User Key  (r) = Recorded By, (t) = Taken By, (c) = Cosigned By      Initials Name Provider Type     Edi Winchester PTA Physical Therapist Assistant                                 PT Assessment/Plan       Row Name 23 1237          PT Assessment    Assessment Comments pt tolerated ex's well; continues with limited knee flexion ROM; noted improvement of gait following session  -        PT Plan    PT Plan Comments Cont 1x/week due to limited insurance approval; cont per POC  -               User Key  (r) = Recorded By, (t) = Taken By, (c) = Cosigned By      Initials Name Provider Type      Edi Winchester PTA Physical Therapist Assistant                     Modalities       Row Name 08/16/23 1100             Ice    Location (R) knee - pt supine  -MH      PT Ice Rx Minutes 10  -MH      Ice S/P Rx Yes  -                User Key  (r) = Recorded By, (t) = Taken By, (c) = Cosigned By      Initials Name Provider Type     Edi Winchester PTA Physical Therapist Assistant                   OP Exercises       Row Name 08/16/23 1315 08/16/23 1100          Subjective Comments    Subjective Comments -- pt denies anything new since last session  -MH        Total Minutes    13436 - PT Therapeutic Exercise Minutes 55  -MH --     85518 - PT Manual Therapy Minutes 15  -MH --        Exercise 1    Exercise Name 1 -- Heelslides with sheet  -     Cueing 1 -- Verbal;Tactile;Demo  -MH     Time 1 -- 10 minutes  -MH        Exercise 2    Exercise Name 2 -- Wallslides  -MH     Cueing 2 -- Verbal;Tactile;Demo  -MH     Time 2 -- 10 minutes  -MH        Exercise 3    Exercise Name 3 -- AIrdyne - seat 0  -MH     Cueing 3 -- Verbal  -MH     Time 3 -- 10 minutes  -MH     Additional Comments -- partial revolutions  -MH        Exercise 4    Exercise Name 4 -- supine HS stretch with sheet  -MH     Cueing 4 -- Verbal;Tactile;Demo  -MH     Reps 4 -- 10  -MH     Time 4 -- 10 sec  -MH        Exercise 5    Exercise Name 5 -- QS over single towel roll  -MH     Cueing 5 -- Verbal;Tactile;Demo  -MH     Reps 5 -- 30  -MH     Time 5 -- 5 sec  -MH        Exercise 6    Exercise Name 6 -- --  -MH     Cueing 6 -- --  -MH        Exercise 7    Exercise Name 7 -- SAQ  -MH     Cueing 7 -- Verbal;Tactile;Demo  -MH     Reps 7 -- 30  -MH     Time 7 -- 5 sec  -MH     Additional Comments -- 1.5#  -        Exercise 8    Exercise Name 8 -- SLR  -MH     Cueing 8 -- Verbal;Tactile;Demo  -MH     Reps 8 -- 30  -MH     Time 8 -- 2 sec  -MH     Additional Comments -- 1.5#  -MH        Exercise 9    Exercise Name 9 -- S/L hip abduction  -MH     Cueing 9 --  Verbal;Tactile;Demo  -     Reps 9 -- 30  -MH     Time 9 -- 2 sec  -     Additional Comments -- 1.5#  -        Exercise 10    Exercise Name 10 -- Prone knee hang  -     Cueing 10 -- Verbal;Tactile;Demo  -     Time 10 -- 4 minutes  -        Exercise 11    Exercise Name 11 -- sidelying hip adduction  -     Cueing 11 -- Verbal;Tactile;Demo  -     Reps 11 -- 30  -     Time 11 -- 2 sec  -     Additional Comments -- 1.5#  -        Exercise 12    Exercise Name 12 -- Standing calf raises  -     Cueing 12 -- Verbal;Tactile;Demo  -     Reps 12 -- 30  -        Exercise 13    Exercise Name 13 -- LAQ  -     Cueing 13 -- Verbal;Tactile;Demo  -     Reps 13 -- 30  -     Additional Comments -- 1.5#  -        Exercise 14    Exercise Name 14 -- standing HS curls  -     Cueing 14 -- Verbal;Tactile;Demo  -     Reps 14 -- 30  -     Time 14 -- 2-3 sec  -        Exercise 15    Exercise Name 15 -- Forward step ups- 6 inch step  -     Cueing 15 -- Verbal;Tactile;Demo  -     Reps 15 -- 30 each  -        Exercise 16    Exercise Name 16 -- Prone hip extension  -     Cueing 16 -- Verbal;Tactile;Demo  -     Reps 16 -- 30  -     Time 16 -- 2 sec  -     Additional Comments -- 1.5#  -        Exercise 17    Exercise Name 17 -- TKE vs TB  -     Cueing 17 -- Verbal;Tactile;Demo  -     Reps 17 -- 30  -     Time 17 -- 5 sec  -     Additional Comments -- Black TB  -        Exercise 18    Exercise Name 18 -- Lateral step up and over- 6 inch step  -     Cueing 18 -- Tactile;Demo;Verbal  -     Reps 18 -- 30  -        Exercise 19    Exercise Name 19 -- Step knee flexion stretch  -     Cueing 19 -- Verbal;Tactile;Demo  -     Reps 19 -- 10  -     Time 19 -- 20 sec  -               User Key  (r) = Recorded By, (t) = Taken By, (c) = Cosigned By      Initials Name Provider Type    Edi Moncada PTA Physical Therapist Assistant                             Manual Rx (last 36  hours)       Manual Treatments       Row Name 08/16/23 1315 08/16/23 1100          Total Minutes    58446 - PT Manual Therapy Minutes 15  -MH --        Manual Rx 1    Manual Rx 1 Location -- Patellar mobs  -     Manual Rx 1 Duration -- x 10 each  -        Manual Rx 2    Manual Rx 2 Location -- Seated passive right knee flexion  -     Manual Rx 2 Grade -- contract relax throughout  -     Manual Rx 2 Duration -- 10 x 10 sec  -        Manual Rx 3    Manual Rx 3 Location -- Posterior tibial mobs  -     Manual Rx 3 Duration -- 3 x 5 osc  -        Manual Rx 4    Manual Rx 4 Location -- Inferior patella glide  -     Manual Rx 4 Duration -- 5 x 10 sec  -               User Key  (r) = Recorded By, (t) = Taken By, (c) = Cosigned By      Initials Name Provider Type     Edi Winchester PTA Physical Therapist Assistant                        Therapy Education  Given: HEP, Symptoms/condition management  Program: Reinforced  How Provided: Verbal  Provided to: Patient  Level of Understanding: Teach back education performed, Verbalized, Demonstrated              Time Calculation:   Start Time: 1100  Stop Time: 1245  Time Calculation (min): 105 min  Timed Charges  21275 - PT Therapeutic Exercise Minutes: 55  06209 - PT Manual Therapy Minutes: 15  Untimed Charges  PT Ice Rx Minutes: 10  Total Minutes  Timed Charges Total Minutes: 70  Untimed Charges Total Minutes: 10   Total Minutes: 70  Therapy Charges for Today       Code Description Service Date Service Provider Modifiers Qty    40784040144 HC PT THER PROC EA 15 MIN 8/16/2023 Edi Winchester PTA GP 4    69952991971 HC PT MANUAL THERAPY EA 15 MIN 8/16/2023 Edi Winchester PTA GP 1                      Edi Winchester PTA  8/16/2023

## 2023-08-21 DIAGNOSIS — Z96.651 STATUS POST RIGHT PARTIAL KNEE REPLACEMENT: ICD-10-CM

## 2023-08-22 RX ORDER — OXYCODONE HYDROCHLORIDE AND ACETAMINOPHEN 5; 325 MG/1; MG/1
1 TABLET ORAL EVERY 6 HOURS PRN
Qty: 40 TABLET | Refills: 0 | Status: SHIPPED | OUTPATIENT
Start: 2023-08-22

## 2023-08-23 ENCOUNTER — HOSPITAL ENCOUNTER (OUTPATIENT)
Dept: PHYSICAL THERAPY | Facility: HOSPITAL | Age: 43
Setting detail: THERAPIES SERIES
Discharge: HOME | End: 2023-08-23
Payer: COMMERCIAL

## 2023-08-23 DIAGNOSIS — Z96.651 S/P TOTAL KNEE ARTHROPLASTY, RIGHT: Primary | ICD-10-CM

## 2023-08-23 PROCEDURE — 97140 MANUAL THERAPY 1/> REGIONS: CPT

## 2023-08-23 PROCEDURE — 97110 THERAPEUTIC EXERCISES: CPT

## 2023-08-23 NOTE — THERAPY TREATMENT NOTE
"  Outpatient Physical Therapy Ortho Treatment Note   [place]     Patient Name: Aida Peralta  : 1980  MRN: [id number]  Today's Date: 2023      Visit Date: 2023    Visit Dx:    ICD-10-CM ICD-9-CM   1. S/P total knee arthroplasty, right  Z96.651 V43.65       Patient Active Problem List   Diagnosis    YULIA (generalized anxiety disorder)    History of seizure    Hypertension    Hypothyroidism    Primary osteoarthritis of both knees    Morbid obesity    Plica of knee, left    Adjustment disorder    Status post right partial knee replacement        Past Medical History:   Diagnosis Date    CTS (carpal tunnel syndrome)     GERD (gastroesophageal reflux disease)     Hypertension     Migraines     Plica of knee, left 10/22/2021    Primary osteoarthritis of both knees 10/22/2021    Seizure     in 2018    Sleep apnea     no machine        Past Surgical History:   Procedure Laterality Date    CHOLECYSTECTOMY      KNEE ARTHROPLASTY, PARTIAL REPLACEMENT Right 2023    Procedure: KNEE ARTHROPLASTY, PARTIAL REPLACEMENT;  Surgeon: Siddhartha Pretty MD;  Location: [facility];  Service: Robotics - Ortho;  Laterality: Right;    TUBAL ABDOMINAL LIGATION          PT Ortho       Row Name 23 1100       Subjective Comments    Subjective Comments pt states over the past week she has had increased pain at night making it difficult to sleep - has been using heat, ice and warm bath soaks wtih minmal to no relief; \"I am over it.\"  Pt says she plans to call MD to see if she can get in sooner and wants to move a head with the manipulation - pt feels she is losing ground  -MH       Right Lower Ext    Rt Knee Extension/Flexion AROM 90 degrees flexion (mild support from therapist), supine post stretches  -MH    Rt Knee Extension/Flexion PROM 90 degrees flexion. seated post stretches  -MH              User Key  (r) = Recorded By, (t) = Taken By, (c) = Cosigned By      Initials Name Provider Type     Edi Winchester " "ALESIA Gonzlaes Physical Therapist Assistant                                 PT Assessment/Plan       Row Name 08/23/23 1552          PT Assessment    Assessment Comments pt with reports of increased pain especially at night with increased disturbed sleep; pt still with limited tolerance to passive stretching with minimal progression of ROM at this time  -        PT Plan    PT Plan Comments Cont weekly - pt plans to contact MD office to see if she can get in sooner and request manipulation  -               User Key  (r) = Recorded By, (t) = Taken By, (c) = Cosigned By      Initials Name Provider Type     Edi Winchester PTA Physical Therapist Assistant                     Modalities       Row Name 08/23/23 1100             Moist Heat    Location Anterior and Posterior R knee/leg with pt supine.  -      PT Moist Heat Minutes 12  -      MH Prior to Rx Yes  -         Ice    Location (R) knee - pt supine  -      PT Ice Rx Minutes 10  -      Ice S/P Rx Yes  -                User Key  (r) = Recorded By, (t) = Taken By, (c) = Cosigned By      Initials Name Provider Type     Edi Winchester PTA Physical Therapist Assistant                   OP Exercises       Row Name 08/23/23 1609 08/23/23 1100          Subjective Comments    Subjective Comments -- pt states over the past week she has had increased pain at night making it difficult to sleep - has been using heat, ice and warm bath soaks wtih minmal to no relief; \"I am over it.\"  Pt says she plans to call MD to see if she can get in sooner and wants to move a head with the manipulation - pt feels she is losing ground  -        Total Minutes    75382 - PT Therapeutic Exercise Minutes 55  - --     92396 - PT Manual Therapy Minutes 12  -MH --        Exercise 1    Exercise Name 1 -- Heelslides with sheet  -     Cueing 1 -- Verbal;Tactile;Demo  -     Time 1 -- 8 min  -        Exercise 2    Exercise Name 2 -- Wallslides  -     Cueing 2 -- " Verbal;Tactile;Demo  -     Time 2 -- 8 min  -        Exercise 3    Exercise Name 3 -- AIrdyne - seat 0  -MH     Cueing 3 -- Verbal  -     Time 3 -- 8 min  -        Exercise 4    Exercise Name 4 -- supine HS stretch with sheet  -     Cueing 4 -- Verbal;Tactile;Demo  -     Reps 4 -- 10  -MH     Time 4 -- 10 sec  -        Exercise 5    Exercise Name 5 -- QS over single towel roll  -     Cueing 5 -- Verbal;Tactile;Demo  -     Reps 5 -- 30  -MH     Time 5 -- 5 sec  -        Exercise 7    Exercise Name 7 -- SAQ  -     Cueing 7 -- Verbal;Tactile;Demo  -     Reps 7 -- 30  -MH     Time 7 -- 5 sec  -     Additional Comments -- 1.5#  -        Exercise 8    Exercise Name 8 -- SLR  -     Cueing 8 -- Verbal;Tactile;Demo  -     Reps 8 -- 30  -MH     Time 8 -- 2 sec  -     Additional Comments -- 1.5#  -        Exercise 9    Exercise Name 9 -- S/L hip abduction  -     Cueing 9 -- Verbal;Tactile;Demo  -     Reps 9 -- 30  -MH     Time 9 -- 2 sec  -     Additional Comments -- 1.5#  -        Exercise 10    Exercise Name 10 -- Prone knee hang  -     Cueing 10 -- Verbal;Tactile;Demo  -     Time 10 -- 4 minutes  -        Exercise 11    Exercise Name 11 -- sidelying hip adduction  -     Cueing 11 -- Verbal;Tactile;Demo  -     Reps 11 -- 30  -MH     Time 11 -- 2 sec  -     Additional Comments -- 1.5#  -        Exercise 12    Exercise Name 12 -- Standing calf raises  -     Cueing 12 -- Verbal;Tactile;Demo  -     Reps 12 -- 30  -        Exercise 13    Exercise Name 13 -- LAQ  -     Cueing 13 -- Verbal;Tactile;Demo  -     Reps 13 -- 30  -     Additional Comments -- 1.5#  -        Exercise 14    Exercise Name 14 -- standing HS curls  -     Cueing 14 -- Verbal;Tactile;Demo  -     Reps 14 -- 30  -MH     Time 14 -- 2-3 sec  -        Exercise 15    Exercise Name 15 -- Forward step ups- 6 inch step  -     Cueing 15 -- Verbal;Tactile;Demo  -     Reps 15 -- 30 each  -MH         Exercise 16    Exercise Name 16 -- Prone hip extension  -     Cueing 16 -- Verbal;Tactile;Demo  -     Reps 16 -- 30  -MH     Time 16 -- 2 sec  -     Additional Comments -- 1.5#  -        Exercise 17    Exercise Name 17 -- TKE vs TB  -MH     Cueing 17 -- Verbal;Tactile;Demo  -     Reps 17 -- 30  -MH     Time 17 -- 5 sec  -MH     Additional Comments -- Black TB  -        Exercise 18    Exercise Name 18 -- Lateral step up and over- 6 inch step  -     Cueing 18 -- Tactile;Demo;Verbal  -     Reps 18 -- 30  -MH        Exercise 19    Exercise Name 19 -- Step knee flexion stretch  -     Cueing 19 -- Verbal;Tactile;Demo  -     Reps 19 -- 10  -MH     Time 19 -- 20 sec  -               User Key  (r) = Recorded By, (t) = Taken By, (c) = Cosigned By      Initials Name Provider Type     Edi Winchester PTA Physical Therapist Assistant                             Manual Rx (last 36 hours)       Manual Treatments       Row Name 08/23/23 1609 08/23/23 1100          Total Minutes    95057 - PT Manual Therapy Minutes 12  -MH --        Manual Rx 2    Manual Rx 2 Location -- Seated passive right knee flexion  -     Manual Rx 2 Duration -- 10 x 10 sec  -        Manual Rx 3    Manual Rx 3 Location -- Posterior tibial mobs  -     Manual Rx 3 Duration -- throughout PROM Flexion  -               User Key  (r) = Recorded By, (t) = Taken By, (c) = Cosigned By      Initials Name Provider Type     Edi Winchester PTA Physical Therapist Assistant                        Therapy Education  Given: HEP, Symptoms/condition management  Program: Reinforced  How Provided: Verbal, Demonstration  Provided to: Patient  Level of Understanding: Teach back education performed, Verbalized, Demonstrated              Time Calculation:   Start Time: 1057  Stop Time: 1240  Time Calculation (min): 103 min  Timed Charges  62196 - PT Therapeutic Exercise Minutes: 55  75141 - PT Manual Therapy Minutes: 12  Untimed Charges  PT  Moist Heat Minutes: 12  PT Ice Rx Minutes: 10  Total Minutes  Timed Charges Total Minutes: 67  Untimed Charges Total Minutes: 22   Total Minutes: 67  Therapy Charges for Today       Code Description Service Date Service Provider Modifiers Qty    96134029183 HC PT THER PROC EA 15 MIN 8/23/2023 Edi Winchester PTA GP 3    80598124559 HC PT MANUAL THERAPY EA 15 MIN 8/23/2023 Edi Winchester PTA GP 1                      Edi Winchester PTA  8/23/2023

## 2023-08-29 ENCOUNTER — HOSPITAL ENCOUNTER (OUTPATIENT)
Dept: PHYSICAL THERAPY | Facility: HOSPITAL | Age: 43
Setting detail: THERAPIES SERIES
Discharge: HOME | End: 2023-08-29
Payer: COMMERCIAL

## 2023-08-29 ENCOUNTER — OFFICE VISIT (OUTPATIENT)
Dept: ORTHOPEDIC SURGERY | Facility: CLINIC | Age: 43
End: 2023-08-29
Payer: COMMERCIAL

## 2023-08-29 VITALS — WEIGHT: 201 LBS | BODY MASS INDEX: 37.95 KG/M2 | HEIGHT: 61 IN | RESPIRATION RATE: 20 BRPM

## 2023-08-29 DIAGNOSIS — Z96.651 STATUS POST RIGHT PARTIAL KNEE REPLACEMENT: Primary | ICD-10-CM

## 2023-08-29 DIAGNOSIS — Z96.651 S/P TOTAL KNEE ARTHROPLASTY, RIGHT: Primary | ICD-10-CM

## 2023-08-29 PROCEDURE — 97140 MANUAL THERAPY 1/> REGIONS: CPT

## 2023-08-29 PROCEDURE — 97110 THERAPEUTIC EXERCISES: CPT

## 2023-08-29 PROCEDURE — 99024 POSTOP FOLLOW-UP VISIT: CPT | Performed by: NURSE PRACTITIONER

## 2023-08-29 NOTE — PROGRESS NOTES
[facility]  Post Operative Visit      Patient Name: Aida Peralta  : 1980  Primary Care Physician: Aida Sheldon APRN        Chief Complaint:  S/P right knee partial arthroplasty with Dr. Pretty on 2023.     HPI:   Aida Peralta is a 42 y.o. who presents today for postoperative evaluation.     Continues to progress nicely. Degree of flexion improved to 100. Pain continues to be controlled. Ambulating independently with improved gait. Wants to RTW next week. Interested in discussing LEFT partial knee for .       Past Medical/Surgical, Social and Family History:  I have reviewed and/or updated pertinent history as noted in the medical record including:  Past Medical History:   Diagnosis Date    CTS (carpal tunnel syndrome)     GERD (gastroesophageal reflux disease)     Hypertension     Migraines     Plica of knee, left 10/22/2021    Primary osteoarthritis of both knees 10/22/2021    Seizure     in 2018    Sleep apnea     no machine     Past Surgical History:   Procedure Laterality Date    CHOLECYSTECTOMY      KNEE ARTHROPLASTY, PARTIAL REPLACEMENT Right 2023    Procedure: KNEE ARTHROPLASTY, PARTIAL REPLACEMENT;  Surgeon: Siddhartha Pretty MD;  Location: [facility] MAIN OR;  Service: Robotics - Ortho;  Laterality: Right;    TUBAL ABDOMINAL LIGATION       Social History     Occupational History    Not on file   Tobacco Use    Smoking status: Never    Smokeless tobacco: Never   Vaping Use    Vaping Use: Never used   Substance and Sexual Activity    Alcohol use: Yes     Alcohol/week: 2.0 standard drinks     Types: 2 Glasses of wine per week    Drug use: Never    Sexual activity: Yes     Partners: Male     Birth control/protection: Tubal ligation      Social History     Social History Narrative    Not on file     Family History   Problem Relation Age of Onset    Cancer Father     Diabetes Father        Allergies:   Allergies   Allergen Reactions    Lisinopril Itching       Medications:    Home Medications:  Current Outpatient Medications on File Prior to Visit   Medication Sig    acetaminophen-codeine (TYLENOL/CODEINE #3) 300-30 MG per tablet Take 1-2 tablets by mouth Every 6 (Six) Hours As Needed for Moderate Pain.    apixaban (ELIQUIS) 2.5 MG tablet tablet Take 1 tablet by mouth 2 (Two) Times a Day.    buPROPion XL (WELLBUTRIN XL) 300 MG 24 hr tablet 1 tablet Daily. Indications: Anxiety disorder    busPIRone (BUSPAR) 15 MG tablet Take 0.5 tablets by mouth 3 (Three) Times a Day As Needed (anxiety). Indications: Anxiety Disorder    doxycycline (VIBRAMYCIN) 100 MG capsule     ondansetron (Zofran) 4 MG tablet Take 1 tablet by mouth Every 6 (Six) Hours As Needed for Nausea or Vomiting.    ondansetron (Zofran) 4 MG tablet Take 1 tablet by mouth Every 8 (Eight) Hours As Needed for Nausea or Vomiting.    oxyCODONE-acetaminophen (PERCOCET) 5-325 MG per tablet Take 1 tablet by mouth Every 6 (Six) Hours As Needed for Severe Pain.    pantoprazole (PROTONIX) 40 MG EC tablet Take 1 tablet by mouth Daily. Indications: Gastroesophageal Reflux Disease    triamterene-hydrochlorothiazide (DYAZIDE) 37.5-25 MG per capsule Take 1 capsule by mouth Daily. Indications: High Blood Pressure Disorder     No current facility-administered medications on file prior to visit.         ROS:  14 point review of systems was negative except as listed in the HPI     Physical Exam:   42 y.o. female  Body mass index is 37.98 kg/mý., 91.2 kg (201 lb)  Vitals:    08/29/23 0915   Resp: 20         General: Alert, cooperative, appears well and in no observable distress.   HEENT: Normocephalic, atraumatic on external visual inspection. No icterus.   CV: No significant peripheral edema.   Respiratory: Normal respiratory effort.   Skin: Warm & well perfused; appropriate skin turgor.  Psych: Appropriate mood & affect.  Neuro: Gross sensation and motor intact in affected extremity/extremities.  Vascular: Peripheral pulses palpable in affected  extremity/extremities. Calves/compartments soft and nontender, no evidence of DVT or compartment syndrome.    Right Knee Exam     Muscle Strength   The patient has normal right knee strength.    Range of Motion   Flexion:  100     Other   Erythema: absent  Sensation: normal  Pulse: present  Swelling: none  Effusion: no effusion present                Investigations:  No new x-rays were needed today.              Assessment:  S/p right partial knee arthroplasty   Body mass index is 37.98 kg/mý.      Plan:  Continue physical therapy and ADLs as tolerated.      Progressing normally. ROM and pain improving with each visit.     Ok to RTW next week; note provided    Educated on dental/GYN/ procedures and preprocedural antibiotic requirement/recommendation    Continue elevation, ice and compression    BMI reviewed    Follow-up in 3 months with bilateral knee imaging    Patient encouraged to call with questions or concerns in the interim       HARRIET Singer

## 2023-08-29 NOTE — THERAPY TREATMENT NOTE
Outpatient Physical Therapy Ortho Treatment Note   [facility]     Patient Name: Aida Peralta  : 1980  MRN: [id number]  Today's Date: 2023      Visit Date: 2023    Visit Dx:    ICD-10-CM ICD-9-CM   1. S/P total knee arthroplasty, right  Z96.651 V43.65       Patient Active Problem List   Diagnosis    YULIA (generalized anxiety disorder)    History of seizure    Hypertension    Hypothyroidism    Primary osteoarthritis of both knees    Morbid obesity    Plica of knee, left    Adjustment disorder    Status post right partial knee replacement        Past Medical History:   Diagnosis Date    CTS (carpal tunnel syndrome)     GERD (gastroesophageal reflux disease)     Hypertension     Migraines     Plica of knee, left 10/22/2021    Primary osteoarthritis of both knees 10/22/2021    Seizure     in 2018    Sleep apnea     no machine        Past Surgical History:   Procedure Laterality Date    CHOLECYSTECTOMY      KNEE ARTHROPLASTY, PARTIAL REPLACEMENT Right 2023    Procedure: KNEE ARTHROPLASTY, PARTIAL REPLACEMENT;  Surgeon: Siddhartha Pretty MD;  Location: [facility];  Service: Robotics - Ortho;  Laterality: Right;    TUBAL ABDOMINAL LIGATION          PT Ortho       Row Name 23 1300       Subjective Comments    Subjective Comments pt states she had ortho appt today and was told she was doing well and would not being doing the manipulation; pt is going to RTW next week  -              User Key  (r) = Recorded By, (t) = Taken By, (c) = Cosigned By      Initials Name Provider Type     Edi Winchester, PTA Physical Therapist Assistant                                 PT Assessment/Plan       Row Name 23 4832          PT Assessment    Assessment Comments pt continues with decreased AROM and limited tolerance to passive stretching; noted improved gait after session with increased heel strike and knee ROM  -        PT Plan    PT Plan Comments Cont per POC  -               User Key   (r) = Recorded By, (t) = Taken By, (c) = Cosigned By      Initials Name Provider Type     Edi WinchesterALESIA Physical Therapist Assistant                     Modalities       Row Name 08/29/23 1300             Moist Heat    Location Anterior and Posterior R knee/leg with pt supine.  -      PT Moist Heat Minutes 12  -MH      MH Prior to Rx Yes  -MH         Ice    Location --  -MH      PT Ice Rx Minutes --  -MH      Ice S/P Rx --  -MH      Patient denies application of Ice Yes  -MH      Patient reports will apply ice at home to involved area Yes  -                User Key  (r) = Recorded By, (t) = Taken By, (c) = Cosigned By      Initials Name Provider Type     Edi Winchester, ALESIA Physical Therapist Assistant                   OP Exercises       Row Name 08/29/23 1554 08/29/23 1300          Subjective Comments    Subjective Comments -- pt states she had ortho appt today and was told she was doing well and would not being doing the manipulation; pt is going to RTW next week  -MH        Total Minutes    21307 - PT Therapeutic Exercise Minutes 50  -MH --     38901 - PT Manual Therapy Minutes 10  -MH --        Exercise 1    Exercise Name 1 -- Heelslides with sheet  -     Cueing 1 -- Verbal;Tactile;Demo  -MH     Time 1 -- 8 min  -MH        Exercise 2    Exercise Name 2 -- Wallslides  -MH     Cueing 2 -- Verbal;Tactile;Demo  -MH     Time 2 -- 8 min  -MH        Exercise 3    Exercise Name 3 -- AIrdyne - seat 0  -MH     Cueing 3 -- Verbal  -MH     Time 3 -- 8 min  -MH        Exercise 4    Exercise Name 4 -- supine HS stretch with sheet  -MH     Cueing 4 -- Verbal;Tactile;Demo  -MH     Reps 4 -- 10  -MH     Time 4 -- 10 sec  -MH        Exercise 5    Exercise Name 5 -- QS over single towel roll  -MH     Cueing 5 -- Verbal;Tactile;Demo  -MH     Reps 5 -- 30  -MH     Time 5 -- 5 sec  -MH        Exercise 7    Exercise Name 7 -- SAQ  -MH     Cueing 7 -- Verbal;Tactile;Demo  -MH     Reps 7 -- 30  -MH     Time 7 -- 5 sec   -     Additional Comments -- 1.5#  -        Exercise 8    Exercise Name 8 -- SLR  -     Cueing 8 -- Verbal;Tactile;Demo  -     Reps 8 -- 30  -     Time 8 -- 2 sec  -     Additional Comments -- 1.5#  -        Exercise 9    Exercise Name 9 -- S/L hip abduction  -     Cueing 9 -- Verbal;Tactile;Demo  -     Reps 9 -- 30  -     Time 9 -- 2 sec  -     Additional Comments -- 1.5#  -        Exercise 10    Exercise Name 10 -- Prone knee hang  -     Cueing 10 -- Verbal;Tactile;Demo  -     Time 10 -- 4 minutes  -        Exercise 11    Exercise Name 11 -- sidelying hip adduction  -     Cueing 11 -- Verbal;Tactile;Demo  -     Reps 11 -- 30  -     Time 11 -- 2 sec  -     Additional Comments -- 1.5#  -        Exercise 12    Exercise Name 12 -- Standing calf raises  -     Cueing 12 -- Verbal;Tactile;Demo  -     Reps 12 -- 30  -        Exercise 13    Exercise Name 13 -- LAQ  -     Cueing 13 -- Verbal;Tactile;Demo  -     Reps 13 -- 30  -     Additional Comments -- 1.5#  -        Exercise 14    Exercise Name 14 -- standing HS curls  -     Cueing 14 -- Verbal;Tactile;Demo  -     Reps 14 -- 30  -     Time 14 -- 2-3 sec  -        Exercise 15    Exercise Name 15 -- Forward step ups- 6 inch step  -     Cueing 15 -- Verbal;Tactile;Demo  -     Reps 15 -- 30 each  -        Exercise 16    Exercise Name 16 -- Prone hip extension  -     Cueing 16 -- Verbal;Tactile;Demo  -     Reps 16 -- 30  -     Time 16 -- 2 sec  -     Additional Comments -- 1.5#  -        Exercise 17    Exercise Name 17 -- TKE vs TB  -     Cueing 17 -- Verbal;Tactile;Demo  -     Reps 17 -- 30  -     Time 17 -- 5 sec  -     Additional Comments -- slilver band  -        Exercise 18    Exercise Name 18 -- Lateral step up and over- 6 inch step  -     Cueing 18 -- Tactile;Demo;Verbal  -     Reps 18 -- 30  -        Exercise 19    Exercise Name 19 -- Step knee flexion stretch  -MH      Cueing 19 -- Verbal;Tactile;Demo  -     Reps 19 -- 10  -MH     Time 19 -- 20 sec  -               User Key  (r) = Recorded By, (t) = Taken By, (c) = Cosigned By      Initials Name Provider Type    Edi Moncada PTA Physical Therapist Assistant                             Manual Rx (last 36 hours)       Manual Treatments       Row Name 08/29/23 1554 08/29/23 1300          Total Minutes    37706 - PT Manual Therapy Minutes 10  -MH --        Manual Rx 2    Manual Rx 2 Location -- Seated passive right knee flexion  -     Manual Rx 2 Duration -- 10 x 10 sec  -        Manual Rx 3    Manual Rx 3 Location -- Posterior tibial mobs  -     Manual Rx 3 Duration -- throughout PROM Flexion  -               User Key  (r) = Recorded By, (t) = Taken By, (c) = Cosigned By      Initials Name Provider Type    INDIO Winchester Edi Gonzales PTA Physical Therapist Assistant                        Therapy Education  Education Details: silver band issued for home  Given: HEP, Symptoms/condition management  Program: Reinforced  How Provided: Verbal, Demonstration  Provided to: Patient  Level of Understanding: Teach back education performed, Verbalized, Demonstrated              Time Calculation:   Start Time: 1300  Stop Time: 1438  Time Calculation (min): 98 min  Timed Charges  86618 - PT Therapeutic Exercise Minutes: 50  99779 - PT Manual Therapy Minutes: 10  Untimed Charges  PT Moist Heat Minutes: 12  Total Minutes  Timed Charges Total Minutes: 60  Untimed Charges Total Minutes: 12   Total Minutes: 60  Therapy Charges for Today       Code Description Service Date Service Provider Modifiers Qty    59473666348 HC PT THER PROC EA 15 MIN 8/29/2023 Edi Winchester PTA GP 3    44950286452 HC PT MANUAL THERAPY EA 15 MIN 8/29/2023 Edi Winchester PTA GP 1                      Edi Winchester PTA  8/29/2023

## 2023-09-05 ENCOUNTER — APPOINTMENT (OUTPATIENT)
Dept: PHYSICAL THERAPY | Facility: HOSPITAL | Age: 43
End: 2023-09-05
Payer: COMMERCIAL

## 2023-09-08 ENCOUNTER — HOSPITAL ENCOUNTER (OUTPATIENT)
Dept: PHYSICAL THERAPY | Facility: HOSPITAL | Age: 43
Setting detail: THERAPIES SERIES
Discharge: HOME | End: 2023-09-08
Payer: COMMERCIAL

## 2023-09-08 DIAGNOSIS — Z96.651 STATUS POST RIGHT PARTIAL KNEE REPLACEMENT: ICD-10-CM

## 2023-09-08 DIAGNOSIS — Z96.651 STATUS POST RIGHT PARTIAL KNEE REPLACEMENT: Primary | ICD-10-CM

## 2023-09-08 PROCEDURE — 97140 MANUAL THERAPY 1/> REGIONS: CPT

## 2023-09-08 PROCEDURE — 97110 THERAPEUTIC EXERCISES: CPT

## 2023-09-08 RX ORDER — OXYCODONE HYDROCHLORIDE AND ACETAMINOPHEN 5; 325 MG/1; MG/1
1 TABLET ORAL NIGHTLY PRN
Qty: 20 TABLET | Refills: 0 | Status: SHIPPED | OUTPATIENT
Start: 2023-09-08

## 2023-09-08 NOTE — THERAPY RE-EVALUATION
"  Outpatient Physical Therapy Ortho Re-Evaluation   Jazz Bullock     Patient Name: Aida Peralta  : 1980  MRN: [id number]  Today's Date: 2023      Visit Date: 2023    Patient Active Problem List   Diagnosis    YULIA (generalized anxiety disorder)    History of seizure    Hypertension    Hypothyroidism    Primary osteoarthritis of both knees    Morbid obesity    Plica of knee, left    Adjustment disorder    Status post right partial knee replacement        Past Medical History:   Diagnosis Date    CTS (carpal tunnel syndrome)     GERD (gastroesophageal reflux disease)     Hypertension     Migraines     Plica of knee, left 10/22/2021    Primary osteoarthritis of both knees 10/22/2021    Seizure     in 2018    Sleep apnea     no machine        Past Surgical History:   Procedure Laterality Date    CHOLECYSTECTOMY      KNEE ARTHROPLASTY, PARTIAL REPLACEMENT Right 2023    Procedure: KNEE ARTHROPLASTY, PARTIAL REPLACEMENT;  Surgeon: Siddhartha Pretty MD;  Location: [facility];  Service: Robotics - Ortho;  Laterality: Right;    TUBAL ABDOMINAL LIGATION         Visit Dx:   No diagnosis found.           PT Ortho       Row Name 23 1500       Subjective Comments    Subjective Comments Pt RTW this week and worked 4 days; she reports its been a rough week and her knee has been more sore and swollen; she has been wearing ace wrap. Pt reports she doesn't have much pain with stairs, but they are still difficult for her to do. \"I go back to the doctor at the end of November and we are going to talk about getting my left knee surgery scheduled.\"  -LN       Subjective Pain    Able to rate subjective pain? yes  -LN    Pre-Treatment Pain Level 0  No present pain but did just recently take pain meds  -LN    Subjective Pain Comment Pt reports pain this past week has been 7/10 at night and 3-5/10 during day.  -LN       Right Lower Ext    Rt Knee Extension/Flexion AROM 100 degrees seated knee flexion post " stretch; 0 degrees knee extension post-stretch  -LN    Rt Knee Extension/Flexion PROM 103 degrees post-stretch  -LN       MMT Right Lower Ext    Rt Hip Flexion MMT, Gross Movement (5/5) normal  -LN    Rt Hip Extension MMT, Gross Movement (5/5) normal  -LN    Rt Hip ABduction MMT, Gross Movement (4+/5) good plus  -LN    Rt Hip ADduction MMT, Gross Movement (4+/5) good plus  -LN    Rt Knee Extension MMT, Gross Movement (4+/5) good plus  -LN    Rt Knee Flexion MMT, Gross Movement (5/5) normal  -LN    Rt Ankle Plantarflexion MMT, Gross Movement (5/5) normal  -LN    Rt Ankle Dorsiflexion MMT, Gross Movement (5/5) normal  -LN       Sensation    Sensation WNL? WNL  -LN    Light Touch No apparent deficits  -LN    Additional Comments no c/o any N/T  -LN       Lower Extremity Flexibility    Hamstrings Right:;Mildly limited  -LN    Quadriceps Right:;Moderately limited  -LN    ITB Right:;Mildly limited  -LN    Gastrocnemius Right:;Mildly limited  -LN    Soleus Right:;Mildly limited  -LN       RLE Quick Girth (cm)    Largest calf 39.3 cm  -LN    Tib tuberosity 38.9 cm  -LN    Mid patella 42.5 cm  -LN    Distal thigh 45.5 cm  -LN    RLE Quick Girth Total 166.2  -LN       Transfers    Sit-Stand [place] (Transfers) independent  -LN    Stand-Sit [place] (Transfers) independent  -LN    Transfers, Sit-Stand-Sit, Assist Device other (see comments)  none  -LN       Gait/Stairs (Locomotion)    [place] Level (Gait) independent  -LN    Assistive Device (Gait) other (see comments)  none used  -LN    Deviations/Abnormal Patterns (Gait) right sided deviations;antalgic  -LN    Right Sided Gait Deviations decreased knee extension  -LN    Comment, (Gait/Stairs) Pt ambulates independently without any AD with improved gait speed with decreased stride length and decreased heel to toe gait on R, but overall improved. Minimal antalgic gait noted.  -LN              User Key  (r) = Recorded By, (t) = Taken By, (c) = Cosigned By       Initials Name Provider Type    Gloria Wolf, PT Physical Therapist                                Therapy Education  Education Details: Gold TB issued for home. Pt to wear ace bandage as needed at work for edema control and pt to ice and elevate knee after work day.  Given: HEP, Symptoms/condition management, Edema management  Program: Reinforced, Progressed  How Provided: Verbal, Demonstration  Provided to: Patient  Level of Understanding: Verbalized, Demonstrated, Teach back education performed      PT OP Goals       Row Name 09/08/23 1500          PT Short Term Goals    STG Date to Achieve 09/22/23  -LN     STG 1 Pt to verbally report decreased right knee/leg pain to <5/10 with ADLs and everyday activities.  -LN     STG 1 Progress Partially Met  -LN     STG 1 Progress Comments Pt reporting increased right knee pain since RTW this week; pain 7/10 at night and 3-5/10 during day. Present knee pain= 0/10 with pain meds.  -LN     STG 2 Pt independent with initial HEP issued by therapist.  -LN     STG 2 Progress Met  -LN     STG 3 Right knee ROM improved to 0-90 degrees to allow her to get in and out of car easier.  -LN     STG 3 Progress Met  -LN     STG 4 Right hip and knee strength improved by 1/2 muscle grade.  -LN     STG 4 Progress Met  -LN     STG 5 Edema right knee decreased by 1-2 cm.  -LN     STG 5 Progress Met  -LN     STG 6 Pt able to ambulate home & short community distances with SPC with only minimal antalgic gait and improved heel to toe gait.  -LN     STG 6 Progress Met  -LN     STG 6 Progress Comments She is now ambulating without any AD with only minimal antalgic gait; still with decreased knee extension and decreased heel toe gait, but overall improved.  -LN     STG 7 Pt to have decreased tightness in R calf to minimal.  -LN     STG 7 Progress Met  -LN     STG 7 Progress Comments No longer c/o any calf tightness or pain.  -LN        Long Term Goals    LTG Date to Achieve 10/06/23  -LN      LTG 1 Pt to verbally report decreased right knee/leg pain to <3/10 with ADLs and everyday activities.  -LN     LTG 1 Progress Partially Met  -LN     LTG 1 Progress Comments Pt reporting increased right knee pain since RTW this week; pain 7/10 at night and 3-5/10 during day. Present knee pain= 0/10 with pain meds.  -LN     LTG 2 Pt independent with more advanced HEP issued by therapist.  -LN     LTG 2 Progress Ongoing;Progressing  -LN     LTG 2 Progress Comments Continues to progress with her exercises.  -LN     LTG 3 Right knee ROM improved to 0-115 degrees.  -LN     LTG 3 Progress Partially Met;Ongoing;Progressing  -LN     LTG 3 Progress Comments Right knee flexion post-stretch= 100 degrees active and 103 degrees passive; 0 degrees extension.  -LN     LTG 4 Right hip and knee strength improved to 4+-5/5.  -LN     LTG 4 Progress Met  -LN     LTG 5 Edema right knee decreased to nominal.  -LN     LTG 5 Progress Met  -LN     LTG 6 Pt able to ambulate home & short community distances without any AD with only nominal limp and good heel to toe gait.  -LN     LTG 6 Progress Partially Met;Ongoing;Progressing  -LN     LTG 6 Progress Comments She is now ambulating without any AD with only minimal antalgic gait; still with decreased knee extension and decreased heel toe gait, but overall improved.  -LN     LTG 7 Pt to have decreased tightness in R calf to nominal.  -LN     LTG 7 Progress Met  -LN     LTG 8 Pt able to go up and down a flight of steps without any difficulty or right knee pain reported.  -LN     LTG 8 Progress Ongoing;Progressing  -LN     LTG 8 Progress Comments Pt reports less pain with this but is still difficult for her to do.  -LN     LTG 9 Pt able to work a full day at her job with right knee pain no > than 3/10.  -LN     LTG 9 Progress New  -        Time Calculation    PT Goal Re-Cert Due Date 10/06/23  -LN               User Key  (r) = Recorded By, (t) = Taken By, (c) = Cosigned By      Initials Name  Provider Type    LN Gloria Kan, PT Physical Therapist                     PT Assessment/Plan       Row Name 09/08/23 1500          PT Assessment    Assessment Comments Pt with c/o increased right knee pain and increased knee swelling since RTW this week and being up on her feet a lot. Pt still with limited R knee flexion but was improved today and she was able to make full revolutions on the bike with only a slight R hip hike. Pt with improved right hip and knee strength, but still with minimal weakness in hip abduction/adduction and Quads. She continues to ambulate well without any AD but still with minimal antalgic gait, but overall improved heel to toe gait. She is doing well with HEP. She still has difficulty with stairs due to limited knee motion, but reports no pain with stairs. Improving muscle girth right Quads and calf. Pt has met 6 out of 7 STG and partiallly met 1 STG (pain goal; which had been previously met before she RTW) and she has met 3 LTG and partially met 3 LTG. 1 new LTG set today. Recommend continued PT 1 x week to progress with strengthening and continue to push/work on improved knee flexion to allow her improved functional mobility and normalization of gait.  -LN        PT Plan    PT Frequency 1x/week  -LN     Predicted Duration of Therapy Intervention (PT) 4 weeks  -LN     PT Plan Comments Continue per POC; progress with exercises as tolerated. Continue to push knee flexion. MH/CP PRN; pt education and gait training as needed.  -LN               User Key  (r) = Recorded By, (t) = Taken By, (c) = Cosigned By      Initials Name Provider Type    Gloria Wolf, PT Physical Therapist                     Modalities       Row Name 09/08/23 1500             Moist Heat    MH Applied Yes  -LN      Location Anterior and Posterior R knee/leg with pt supine.  -LN      PT Moist Heat Minutes 12  -LN      MH Prior to Rx Yes  -LN         Ice    Ice Applied Yes  -LN      Location  "Anterior and Posterior R knee with pt supine.  -LN      PT Ice Rx Minutes 10  -LN      Ice S/P Rx Yes  -LN      Patient reports will apply ice at home to involved area Yes  -LN                User Key  (r) = Recorded By, (t) = Taken By, (c) = Cosigned By      Initials Name Provider Type    Gloria Wolf, PT Physical Therapist                   OP Exercises       Row Name 09/08/23 1500             Subjective Comments    Subjective Comments Pt RTW this week and worked 4 days; she reports its been a rough week and her knee has been more sore and swollen; she has been wearing ace wrap. Pt reports she doesn't have much pain with stairs, but they are still difficult for her to do. \"I go back to the doctor at the end of November and we are going to talk about getting my left knee surgery scheduled.\"  -LN         Subjective Pain    Able to rate subjective pain? yes  -LN      Pre-Treatment Pain Level 0  No present pain but did just recently take pain meds  -LN      Subjective Pain Comment Pt reports pain this past week has been 7/10 at night and 3-5/10 during day.  -LN         Total Minutes    99886 - PT Therapeutic Exercise Minutes 50  -LN      53244 - PT Manual Therapy Minutes 10  -LN         Exercise 1    Exercise Name 1 Heelslides with sheet  -LN      Cueing 1 Verbal;Tactile;Demo  -LN      Time 1 8 min  -LN         Exercise 2    Exercise Name 2 Wallslides  -LN      Cueing 2 Verbal;Tactile;Demo  -LN      Time 2 8 min  -LN         Exercise 3    Exercise Name 3 AIrdyne - seat 0  -LN      Cueing 3 Verbal  -LN      Time 3 10 minutes  -LN      Additional Comments able to make full revolutions today with slight hip hike  -LN         Exercise 4    Exercise Name 4 supine HS stretch with sheet  -LN      Cueing 4 Verbal;Tactile;Demo  -LN      Reps 4 10  -LN      Time 4 10 sec  -LN         Exercise 5    Exercise Name 5 QS over single towel roll  -LN      Cueing 5 Verbal;Tactile;Demo  -LN      Reps 5 30  -LN      Time 5 " 5 sec  -LN         Exercise 7    Exercise Name 7 SAQ  -LN      Cueing 7 Verbal;Tactile;Demo  -LN      Reps 7 30  -LN      Time 7 5 sec  -LN      Additional Comments 2#  -LN         Exercise 8    Exercise Name 8 SLR  -LN      Cueing 8 Verbal;Tactile;Demo  -LN      Reps 8 30  -LN      Time 8 2 sec  -LN      Additional Comments 2#  -LN         Exercise 9    Exercise Name 9 S/L hip abduction  -LN      Cueing 9 Verbal;Tactile;Demo  -LN      Reps 9 30  -LN      Time 9 2 sec  -LN      Additional Comments 2#  -LN         Exercise 10    Exercise Name 10 Prone knee hang  -LN      Cueing 10 Verbal;Tactile;Demo  -LN      Time 10 4 minutes  -LN         Exercise 11    Exercise Name 11 sidelying hip adduction  -LN      Cueing 11 Verbal;Tactile;Demo  -LN      Reps 11 30  -LN      Time 11 2 sec  -LN      Additional Comments 2#  -LN         Exercise 12    Exercise Name 12 Standing calf raises  -LN      Cueing 12 Verbal;Tactile;Demo  -LN      Reps 12 30  -LN         Exercise 13    Exercise Name 13 LAQ  -LN      Cueing 13 Verbal;Tactile;Demo  -LN      Reps 13 30  -LN      Additional Comments 2#  -LN         Exercise 14    Exercise Name 14 standing HS curls  -LN      Cueing 14 Verbal;Tactile;Demo  -LN      Reps 14 30  -LN      Time 14 2-3 sec  -LN         Exercise 15    Exercise Name 15 Forward step ups- 6 inch step  -LN      Cueing 15 Verbal;Tactile;Demo  -LN      Reps 15 30 each  -LN         Exercise 16    Exercise Name 16 Prone hip extension  -LN      Cueing 16 Verbal;Tactile;Demo  -LN      Reps 16 30  -LN      Time 16 2 sec  -LN      Additional Comments 2#  -LN         Exercise 17    Exercise Name 17 TKE vs TB  -LN      Cueing 17 Verbal;Tactile;Demo  -LN      Reps 17 30  -LN      Time 17 5 sec  -LN      Additional Comments gold TB  -LN         Exercise 18    Exercise Name 18 Lateral step up and over- 6 inch step  -LN      Cueing 18 Tactile;Demo;Verbal  -LN      Reps 18 30  -LN         Exercise 19    Exercise Name 19 Step knee  flexion stretch  -LN      Cueing 19 Verbal;Tactile;Demo  -LN      Reps 19 10  -LN      Time 19 20 sec  -LN                User Key  (r) = Recorded By, (t) = Taken By, (c) = Cosigned By      Initials Name Provider Type    Gloria Wolf, PT Physical Therapist                  Manual Rx (last 36 hours)       Manual Treatments       Row Name 09/08/23 1500             Total Minutes    85153 - PT Manual Therapy Minutes 10  -LN         Manual Rx 2    Manual Rx 2 Location Seated passive right knee flexion  -LN      Manual Rx 2 Duration 10 x 10 sec  -LN         Manual Rx 3    Manual Rx 3 Location Posterior tibial mobs  -LN      Manual Rx 3 Duration throughout PROM Flexion  -LN                User Key  (r) = Recorded By, (t) = Taken By, (c) = Cosigned By      Initials Name Provider Type    Gloria Wolf, PT Physical Therapist                                          Time Calculation:     Start Time: 1515  Stop Time: 1655  Time Calculation (min): 100 min  Timed Charges  57829 - PT Therapeutic Exercise Minutes: 50  52237 - PT Manual Therapy Minutes: 10  Untimed Charges  PT Moist Heat Minutes: 12  PT Ice Rx Minutes: 10  Total Minutes  Timed Charges Total Minutes: 60  Untimed Charges Total Minutes: 22   Total Minutes: 82     Therapy Charges for Today       Code Description Service Date Service Provider Modifiers Qty    [id number] HC PT THER PROC EA 15 MIN 9/8/2023 Gloria Kan, PT GP 3    98711490622 HC PT MANUAL THERAPY EA 15 MIN 9/8/2023 Gloria Kan, PT GP 1                      Gloria Kan, PT  9/8/2023

## 2023-09-11 ENCOUNTER — TELEPHONE (OUTPATIENT)
Dept: ORTHOPEDIC SURGERY | Facility: CLINIC | Age: 43
End: 2023-09-11
Payer: COMMERCIAL

## 2023-09-11 NOTE — TELEPHONE ENCOUNTER
FOR DOCUMENTATION PURPOSES ONLY: PATIENT CAME IN TODAY AND STATED THAT HER HR DEPT FROM WORK NEEDED HER WORK EXCUSE FROM 8/29/23 TO STATE SHE COULD RETURN TO WORK ON 9/4/23 INSTEAD 9/5/23. 9/5/23 WAS PUT DUE TO THE HOLIDAY.

## 2023-09-13 ENCOUNTER — HOSPITAL ENCOUNTER (OUTPATIENT)
Dept: PHYSICAL THERAPY | Facility: HOSPITAL | Age: 43
Setting detail: THERAPIES SERIES
Discharge: HOME | End: 2023-09-13
Payer: COMMERCIAL

## 2023-09-13 DIAGNOSIS — Z96.651 S/P TOTAL KNEE ARTHROPLASTY, RIGHT: Primary | ICD-10-CM

## 2023-09-13 NOTE — THERAPY TREATMENT NOTE
Outpatient Physical Therapy Ortho Treatment Note   Jazz Bullock     Patient Name: Aida Peralta  : 1980  MRN: [id number]  Today's Date: 2023      Visit Date: 2023    Visit Dx:    ICD-10-CM ICD-9-CM   1. S/P total knee arthroplasty, right  Z96.651 V43.65       Patient Active Problem List   Diagnosis    YULIA (generalized anxiety disorder)    History of seizure    Hypertension    Hypothyroidism    Primary osteoarthritis of both knees    Morbid obesity    Plica of knee, left    Adjustment disorder    Status post right partial knee replacement        Past Medical History:   Diagnosis Date    CTS (carpal tunnel syndrome)     GERD (gastroesophageal reflux disease)     Hypertension     Migraines     Plica of knee, left 10/22/2021    Primary osteoarthritis of both knees 10/22/2021    Seizure     in 2018    Sleep apnea     no machine        Past Surgical History:   Procedure Laterality Date    CHOLECYSTECTOMY      KNEE ARTHROPLASTY, PARTIAL REPLACEMENT Right 2023    Procedure: KNEE ARTHROPLASTY, PARTIAL REPLACEMENT;  Surgeon: Siddhartha Pretty MD;  Location: [facility] OR;  Service: Robotics - Ortho;  Laterality: Right;    TUBAL ABDOMINAL LIGATION          PT Ortho       Row Name 23 1400       Subjective    Subjective Comments pt states she is doing pretty good today  -              User Key  (r) = Recorded By, (t) = Taken By, (c) = Cosigned By      Initials Name Provider Type    Edi Moncada PTA Physical Therapist Assistant                                 PT Assessment/Plan       Row Name 23 1518          PT Assessment    Assessment Comments pt tolerating RTW well; continues to be challenged with current reps/resistance  -MH        PT Plan    PT Plan Comments Cont per POC  -               User Key  (r) = Recorded By, (t) = Taken By, (c) = Cosigned By      Initials Name Provider Type    Edi Moncada PTA Physical Therapist Assistant                     Modalities       Row  Name 09/13/23 1400             Moist Heat    MH Prior to Rx No  -         Ice    Location Anterior and Posterior R knee with pt supine.  -      PT Ice Rx Minutes 10  -MH      Ice S/P Rx Yes  -                User Key  (r) = Recorded By, (t) = Taken By, (c) = Cosigned By      Initials Name Provider Type     Edi Winchester, PTA Physical Therapist Assistant                   OP Exercises       Row Name 09/13/23 1522 09/13/23 1400          Subjective    Subjective Comments -- pt states she is doing pretty good today  -        Total Minutes    42087 - PT Therapeutic Exercise Minutes 45  -MH --     64952 - PT Manual Therapy Minutes 10  -MH --        Exercise 1    Exercise Name 1 -- Heelslides with sheet  -     Cueing 1 -- Verbal;Tactile;Demo  -     Time 1 -- 8 min  -        Exercise 2    Exercise Name 2 -- Wallslides  -     Cueing 2 -- Verbal;Tactile;Demo  -     Time 2 -- 8 min  -        Exercise 3    Exercise Name 3 -- AIrdyne - seat 0  -     Cueing 3 -- Verbal  -     Time 3 -- 10 minutes  -        Exercise 4    Exercise Name 4 -- supine HS stretch with sheet  -     Cueing 4 -- Verbal;Tactile;Demo  -     Reps 4 -- 10  -MH     Time 4 -- 10 sec  -        Exercise 5    Exercise Name 5 -- QS over single towel roll  -     Cueing 5 -- Verbal;Tactile;Demo  -     Reps 5 -- 30  -MH     Time 5 -- 5 sec  -        Exercise 7    Exercise Name 7 -- SAQ  -     Cueing 7 -- Verbal;Tactile;Demo  -     Reps 7 -- 30  -MH     Time 7 -- 5 sec  -MH     Additional Comments -- 2#  -        Exercise 8    Exercise Name 8 -- SLR  -     Cueing 8 -- Verbal;Tactile;Demo  -     Reps 8 -- 30  -MH     Time 8 -- 2 sec  -MH     Additional Comments -- 2#  -        Exercise 9    Exercise Name 9 -- S/L hip abduction  -     Cueing 9 -- Verbal;Tactile;Demo  -     Reps 9 -- 30  -MH     Time 9 -- 2 sec  -MH     Additional Comments -- 2#  -        Exercise 10    Exercise Name 10 -- Prone knee hang  -      Cueing 10 -- Verbal;Tactile;Demo  -     Time 10 -- 4 minutes  -        Exercise 11    Exercise Name 11 -- sidelying hip adduction  -     Cueing 11 -- Verbal;Tactile;Demo  -     Reps 11 -- 30  -     Time 11 -- 2 sec  -     Additional Comments -- 2#  -        Exercise 12    Exercise Name 12 -- Standing calf raises  -     Cueing 12 -- Verbal;Tactile;Demo  -     Reps 12 -- 30  -        Exercise 13    Exercise Name 13 -- LAQ  -     Cueing 13 -- Verbal;Tactile;Demo  -     Reps 13 -- 30  -     Time 13 -- 2#  -        Exercise 14    Exercise Name 14 -- standing HS curls  -     Cueing 14 -- Verbal;Tactile;Demo  -     Reps 14 -- 30  -     Time 14 -- 2-3 sec  -        Exercise 15    Exercise Name 15 -- Forward step ups- 6 inch step  -     Cueing 15 -- Verbal;Tactile;Demo  -     Reps 15 -- 30 each  -        Exercise 16    Exercise Name 16 -- Prone hip extension  -     Cueing 16 -- Verbal;Tactile;Demo  -     Reps 16 -- 30  -     Time 16 -- 2 sec  -        Exercise 17    Exercise Name 17 -- TKE vs TB  -     Cueing 17 -- Verbal;Tactile;Demo  -     Reps 17 -- 30  -     Time 17 -- 5 sec  -     Additional Comments -- gold  -        Exercise 18    Exercise Name 18 -- Lateral step up and over- 6 inch step  -     Cueing 18 -- Tactile;Demo;Verbal  -     Reps 18 -- 30  -        Exercise 19    Exercise Name 19 -- Step knee flexion stretch  -     Cueing 19 -- Verbal;Tactile;Demo  -     Reps 19 -- 10  -     Time 19 -- 20 sec  -               User Key  (r) = Recorded By, (t) = Taken By, (c) = Cosigned By      Initials Name Provider Type     Edi Winchester, PTA Physical Therapist Assistant                             Manual Rx (last 36 hours)       Manual Treatments       Row Name 09/13/23 1522 09/13/23 1400          Total Minutes    90986 - PT Manual Therapy Minutes 10  -MH --        Manual Rx 2    Manual Rx 2 Location -- Seated passive right knee flexion  -      Manual Rx 2 Duration -- 10 x 10 sec  -        Manual Rx 3    Manual Rx 3 Location -- Posterior tibial mobs  -     Manual Rx 3 Duration -- throughout PROM Flexion  -               User Key  (r) = Recorded By, (t) = Taken By, (c) = Cosigned By      Initials Name Provider Type    Edi Moncada PTA Physical Therapist Assistant                        Therapy Education  Given: HEP, Symptoms/condition management  Program: Reinforced  How Provided: Verbal  Provided to: Patient  Level of Understanding: Teach back education performed, Verbalized, Demonstrated              Time Calculation:   Start Time: 1358  Stop Time: 1521  Time Calculation (min): 83 min  Timed Charges  56885 - PT Therapeutic Exercise Minutes: 45  88522 - PT Manual Therapy Minutes: 10  Untimed Charges  PT Ice Rx Minutes: 10  Total Minutes  Timed Charges Total Minutes: 55  Untimed Charges Total Minutes: 10   Total Minutes: 55                Edi Winchester PTA  9/13/2023

## 2023-09-19 ENCOUNTER — HOSPITAL ENCOUNTER (OUTPATIENT)
Dept: PHYSICAL THERAPY | Facility: HOSPITAL | Age: 43
Setting detail: THERAPIES SERIES
Discharge: HOME | End: 2023-09-19
Payer: COMMERCIAL

## 2023-09-19 DIAGNOSIS — Z96.651 STATUS POST RIGHT PARTIAL KNEE REPLACEMENT: Primary | ICD-10-CM

## 2023-09-19 PROCEDURE — 97110 THERAPEUTIC EXERCISES: CPT

## 2023-09-19 PROCEDURE — 97140 MANUAL THERAPY 1/> REGIONS: CPT

## 2023-09-19 NOTE — THERAPY TREATMENT NOTE
"  Outpatient Physical Therapy Ortho Treatment Note   Jazz Bullock     Patient Name: Aida Peralta  : 1980  MRN: [id number]  Today's Date: 2023      Visit Date: 2023    Visit Dx:    ICD-10-CM ICD-9-CM   1. Status post right partial knee replacement  Z96.651 V43.65       Patient Active Problem List   Diagnosis    YULIA (generalized anxiety disorder)    History of seizure    Hypertension    Hypothyroidism    Primary osteoarthritis of both knees    Morbid obesity    Plica of knee, left    Adjustment disorder    Status post right partial knee replacement        Past Medical History:   Diagnosis Date    CTS (carpal tunnel syndrome)     GERD (gastroesophageal reflux disease)     Hypertension     Migraines     Plica of knee, left 10/22/2021    Primary osteoarthritis of both knees 10/22/2021    Seizure     in 2018    Sleep apnea     no machine        Past Surgical History:   Procedure Laterality Date    CHOLECYSTECTOMY      KNEE ARTHROPLASTY, PARTIAL REPLACEMENT Right 2023    Procedure: KNEE ARTHROPLASTY, PARTIAL REPLACEMENT;  Surgeon: Siddhartha Pretty MD;  Location: [facility] MAIN OR;  Service: Robotics - Ortho;  Laterality: Right;    TUBAL ABDOMINAL LIGATION          PT Ortho       Row Name 23 1500       Subjective    Subjective Comments \"My knee has been aching a little more the past couple days for some reason.\"  -LN       Subjective Pain    Able to rate subjective pain? yes  -LN    Pre-Treatment Pain Level 2  2-3/10  -LN       Right Lower Ext    Rt Knee Extension/Flexion AROM 103 degrees seated knee flexion post stretch; 0 degrees knee extension post-stretch  -LN    Rt Knee Extension/Flexion PROM 105 degrees post-stretch  -LN              User Key  (r) = Recorded By, (t) = Taken By, (c) = Cosigned By      Initials Name Provider Type    LN Gloria Kan, PT Physical Therapist                                 PT Assessment/Plan       Row Name 23 1600          PT Assessment    " "Assessment Comments Pt with improving right knee ROM and she is not lifting her right hip as much with PROM and bike.  She tolerated treatment well today. Pt with c/o mild aching the past couple days. Overall decreased limp noted.  -LN        PT Plan    PT Frequency 1x/week  -LN     PT Plan Comments Cont per POC  -LN               User Key  (r) = Recorded By, (t) = Taken By, (c) = Cosigned By      Initials Name Provider Type    Gloria Wolf, PT Physical Therapist                     Modalities       Row Name 09/19/23 1500             Moist Heat    MH Applied Yes  -LN      Location Anterior and Posterior R knee/leg with pt supine.  -LN      PT Moist Heat Minutes 12  -LN      MH Prior to Rx Yes  -LN         Ice    Ice Applied --  -LN      Location --  -LN      PT Ice Rx Minutes --  -LN      Ice S/P Rx --  -LN      Patient denies application of Ice Yes  -LN      Patient reports will apply ice at home to involved area Yes  -LN                User Key  (r) = Recorded By, (t) = Taken By, (c) = Cosigned By      Initials Name Provider Type    Gloria Wolf, PT Physical Therapist                   OP Exercises       Row Name 09/19/23 1700 09/19/23 1500          Subjective    Subjective Comments -- \"My knee has been aching a little more the past couple days for some reason.\"  -LN        Subjective Pain    Able to rate subjective pain? -- yes  -LN     Pre-Treatment Pain Level -- 2  2-3/10  -LN        Total Minutes    10024 - PT Therapeutic Exercise Minutes -- 45  -LN     63180 - PT Manual Therapy Minutes 10  -LN --        Exercise 1    Exercise Name 1 -- Heelslides with sheet  -LN     Cueing 1 -- Verbal;Tactile;Demo  -LN     Time 1 -- 8 min  -LN        Exercise 2    Exercise Name 2 -- Wallslides  -LN     Cueing 2 -- Verbal;Tactile;Demo  -LN     Time 2 -- 8 min  -LN        Exercise 3    Exercise Name 3 -- AIrdyne - seat 0  -LN     Cueing 3 -- Verbal  -LN     Time 3 -- 10 minutes  -LN        Exercise 4 "    Exercise Name 4 -- supine HS stretch with sheet  -LN     Cueing 4 -- Verbal;Tactile;Demo  -LN     Reps 4 -- 10  -LN     Time 4 -- 10 sec  -LN        Exercise 5    Exercise Name 5 -- QS over single towel roll  -LN     Cueing 5 -- Verbal;Tactile;Demo  -LN     Reps 5 -- 30  -LN     Time 5 -- 5 sec  -LN        Exercise 7    Exercise Name 7 -- SAQ  -LN     Cueing 7 -- Verbal;Tactile;Demo  -LN     Reps 7 -- 30  -LN     Time 7 -- 5 sec  -LN     Additional Comments -- 2#  -LN        Exercise 8    Exercise Name 8 -- SLR  -LN     Cueing 8 -- Verbal;Tactile;Demo  -LN     Reps 8 -- 30  -LN     Time 8 -- 2 sec  -LN     Additional Comments -- 2#  -LN        Exercise 9    Exercise Name 9 -- S/L hip abduction  -LN     Cueing 9 -- Verbal;Tactile;Demo  -LN     Reps 9 -- 30  -LN     Time 9 -- 2 sec  -LN     Additional Comments -- 2#  -LN        Exercise 10    Exercise Name 10 -- Prone knee hang  -LN     Cueing 10 -- Verbal;Tactile;Demo  -LN     Time 10 -- 4 minutes  -LN        Exercise 11    Exercise Name 11 -- sidelying hip adduction  -LN     Cueing 11 -- Verbal;Tactile;Demo  -LN     Reps 11 -- 30  -LN     Time 11 -- 2 sec  -LN     Additional Comments -- 2#  -LN        Exercise 12    Exercise Name 12 -- Standing calf raises  -LN     Cueing 12 -- Verbal;Tactile;Demo  -LN     Reps 12 -- 30  -LN        Exercise 13    Exercise Name 13 -- LAQ  -LN     Cueing 13 -- Verbal;Tactile;Demo  -LN     Reps 13 -- 30  -LN     Time 13 -- 2#  -LN        Exercise 14    Exercise Name 14 -- standing HS curls  -LN     Cueing 14 -- Verbal;Tactile;Demo  -LN     Reps 14 -- 30  -LN     Time 14 -- 2-3 sec  -LN        Exercise 15    Exercise Name 15 -- Forward step ups- 6 inch step  -LN     Cueing 15 -- Verbal;Tactile;Demo  -LN     Reps 15 -- 30 each  -LN        Exercise 16    Exercise Name 16 -- Prone hip extension  -LN     Cueing 16 -- Verbal;Tactile;Demo  -LN     Reps 16 -- 30  -LN     Time 16 -- 2 sec  -LN     Additional Comments -- 2#  -LN         Exercise 17    Exercise Name 17 -- TKE vs TB  -LN     Cueing 17 -- Verbal;Tactile;Demo  -LN     Reps 17 -- 30  -LN     Time 17 -- 5 sec  -LN     Additional Comments -- gold  -LN        Exercise 18    Exercise Name 18 -- Lateral step up and over- 6 inch step  -LN     Cueing 18 -- Tactile;Demo;Verbal  -LN     Reps 18 -- 30  -LN        Exercise 19    Exercise Name 19 -- Step knee flexion stretch  -LN     Cueing 19 -- Verbal;Tactile;Demo  -LN     Reps 19 -- 10  -LN     Time 19 -- 20 sec  -LN               User Key  (r) = Recorded By, (t) = Taken By, (c) = Cosigned By      Initials Name Provider Type    LN Gloria Kan, PT Physical Therapist                             Manual Rx (last 36 hours)       Manual Treatments       Row Name 09/19/23 1700             Total Minutes    81863 - PT Manual Therapy Minutes 10  -LN         Manual Rx 2    Manual Rx 2 Location Seated passive right knee flexion  -LN      Manual Rx 2 Duration 10 x 10 sec  -LN         Manual Rx 3    Manual Rx 3 Location Posterior tibial mobs  -LN      Manual Rx 3 Duration throughout PROM Flexion  -LN                User Key  (r) = Recorded By, (t) = Taken By, (c) = Cosigned By      Initials Name Provider Type    LN Gloria Kan, PT Physical Therapist                        Therapy Education  Given: HEP, Symptoms/condition management  Program: Reinforced  How Provided: Verbal  Provided to: Patient  Level of Understanding: Teach back education performed, Verbalized, Demonstrated              Time Calculation:   Start Time: 1550  Stop Time: 1715  Time Calculation (min): 85 min  Timed Charges  97409 - PT Therapeutic Exercise Minutes: 45  49430 - PT Manual Therapy Minutes: 10  Untimed Charges  PT Moist Heat Minutes: 12  Total Minutes  Timed Charges Total Minutes: 10  Untimed Charges Total Minutes: 12   Total Minutes: 10  Therapy Charges for Today       Code Description Service Date Service Provider Modifiers Qty    20945408917  PT THER  PROC EA 15 MIN 9/19/2023 Gloria Kan, PT GP 3    [id number]  PT MANUAL THERAPY EA 15 MIN 9/19/2023 Gloria Kan, PT GP 1                      Gloria Kan, PT  9/19/2023

## 2023-09-26 ENCOUNTER — HOSPITAL ENCOUNTER (OUTPATIENT)
Dept: PHYSICAL THERAPY | Facility: HOSPITAL | Age: 43
Setting detail: THERAPIES SERIES
Discharge: HOME | End: 2023-09-26
Payer: COMMERCIAL

## 2023-09-26 DIAGNOSIS — Z96.651 STATUS POST RIGHT PARTIAL KNEE REPLACEMENT: Primary | ICD-10-CM

## 2023-09-26 PROCEDURE — 97110 THERAPEUTIC EXERCISES: CPT

## 2023-09-26 PROCEDURE — 97140 MANUAL THERAPY 1/> REGIONS: CPT

## 2023-09-26 NOTE — THERAPY TREATMENT NOTE
Outpatient Physical Therapy Ortho Treatment Note   Jazz Bullock     Patient Name: Aida Peralta  : 1980  MRN: [id number]  Today's Date: 2023      Visit Date: 2023    Visit Dx:    ICD-10-CM ICD-9-CM   1. Status post right partial knee replacement  Z96.651 V43.65       Patient Active Problem List   Diagnosis    YULIA (generalized anxiety disorder)    History of seizure    Hypertension    Hypothyroidism    Primary osteoarthritis of both knees    Morbid obesity    Plica of knee, left    Adjustment disorder    Status post right partial knee replacement        Past Medical History:   Diagnosis Date    CTS (carpal tunnel syndrome)     GERD (gastroesophageal reflux disease)     Hypertension     Migraines     Plica of knee, left 10/22/2021    Primary osteoarthritis of both knees 10/22/2021    Seizure     in 2018    Sleep apnea     no machine        Past Surgical History:   Procedure Laterality Date    CHOLECYSTECTOMY      KNEE ARTHROPLASTY, PARTIAL REPLACEMENT Right 2023    Procedure: KNEE ARTHROPLASTY, PARTIAL REPLACEMENT;  Surgeon: Siddhartha Pretty MD;  Location: [facility];  Service: Robotics - Ortho;  Laterality: Right;    TUBAL ABDOMINAL LIGATION          PT Ortho       Row Name 23 1600       Subjective Pain    Able to rate subjective pain? yes  -LN    Pre-Treatment Pain Level 4  -LN    Subjective Pain Comment Pt reported her right knee felt much better after session.  -LN       Right Lower Ext    Rt Knee Extension/Flexion AROM 107 degrees seated knee flexion post stretch; 0 degrees knee extension post-stretch  -LN    Rt Knee Extension/Flexion PROM 110 degrees post-stretch  -LN              User Key  (r) = Recorded By, (t) = Taken By, (c) = Cosigned By      Initials Name Provider Type    Gloria Wolf, PT Physical Therapist                                 PT Assessment/Plan       Row Name 23 1700          PT Assessment    Assessment Comments Pt with c/o increased  "right knee pain today but may be due to her having increased left knee pain recently and causing her to put more weight on her right leg. Overall decreased edema noted right knee. Pt with improved knee ROM but still painful with passive stretching and she still tends to have to extend her back and lift her right hip but she doesn't seem to lift her hip as high as she used to. Right knee strength continues to improve and she was able to do increased weight with leg lifts today.  Only nominal antagic gait noted. She reported that her knee felt much better after session.  -LN        PT Plan    PT Frequency 1x/week  -LN     PT Plan Comments Cont per POC  -LN               User Key  (r) = Recorded By, (t) = Taken By, (c) = Cosigned By      Initials Name Provider Type    Gloria Wolf, KELTON Physical Therapist                     Modalities       Row Name 09/26/23 1600             Precautions    Existing Precautions/Restrictions no known precautions/restrictions  -LN         Subjective    Subjective Comments \"My knee is hurting today.\" \"It still hurts more at night.\" \"My other knee hasn't been good, so I think I am putting more weight on my right leg and that is part of it.\" \"It doesn't seem as swollen though.\"  -LN         Moist Heat    MH Applied Yes  -LN      Location Anterior and Posterior R knee/leg with pt supine.  -LN      PT Moist Heat Minutes 12  -LN      MH Prior to Rx Yes  -LN         Ice    Patient denies application of Ice Yes  -LN      Patient reports will apply ice at home to involved area Yes  -LN                User Key  (r) = Recorded By, (t) = Taken By, (c) = Cosigned By      Initials Name Provider Type    Gloria Wolf, PT Physical Therapist                   OP Exercises       Row Name 09/26/23 1600 09/26/23 1500          Precautions    Existing Precautions/Restrictions no known precautions/restrictions  -LN --        Subjective    Subjective Comments \"My knee is hurting today.\" " "\"It still hurts more at night.\" \"My other knee hasn't been good, so I think I am putting more weight on my right leg and that is part of it.\" \"It doesn't seem as swollen though.\"  -LN --        Subjective Pain    Able to rate subjective pain? yes  -LN --     Pre-Treatment Pain Level 4  -LN --     Subjective Pain Comment Pt reported her right knee felt much better after session.  -LN --        Total Minutes    89443 - PT Therapeutic Exercise Minutes 45  -LN --     74894 - PT Manual Therapy Minutes -- 10  -LN        Exercise 1    Exercise Name 1 Heelslides with sheet  -LN --     Cueing 1 Verbal;Tactile;Demo  -LN --     Time 1 8 min  -LN --        Exercise 2    Exercise Name 2 Wallslides  -LN --     Cueing 2 Verbal;Tactile;Demo  -LN --     Time 2 8 min  -LN --        Exercise 3    Exercise Name 3 Airdyne - seat 0  -LN --     Cueing 3 Verbal  -LN --     Time 3 10 minutes  -LN --        Exercise 4    Exercise Name 4 supine HS stretch with sheet  -LN --     Cueing 4 Verbal;Tactile;Demo  -LN --     Reps 4 10  -LN --     Time 4 10 sec  -LN --        Exercise 5    Exercise Name 5 QS over single towel roll  -LN --     Cueing 5 Verbal;Tactile;Demo  -LN --     Reps 5 30  -LN --     Time 5 5 sec  -LN --        Exercise 7    Exercise Name 7 SAQ  -LN --     Cueing 7 Verbal;Tactile;Demo  -LN --     Reps 7 30  -LN --     Time 7 5 sec  -LN --     Additional Comments 2.5#  -LN --        Exercise 8    Exercise Name 8 SLR  -LN --     Cueing 8 Verbal;Tactile;Demo  -LN --     Reps 8 30  -LN --     Time 8 2 sec  -LN --     Additional Comments 2.5#  -LN --        Exercise 9    Exercise Name 9 S/L hip abduction  -LN --     Cueing 9 Verbal;Tactile;Demo  -LN --     Reps 9 30  -LN --     Time 9 2 sec  -LN --     Additional Comments 2.5#  -LN --        Exercise 10    Exercise Name 10 Prone knee hang  -LN --     Cueing 10 Verbal;Tactile;Demo  -LN --     Time 10 5 minutes  -LN --        Exercise 11    Exercise Name 11 sidelying hip adduction  -LN " --     Cueing 11 Verbal;Tactile;Demo  -LN --     Reps 11 30  -LN --     Time 11 2 sec  -LN --     Additional Comments 2.5#  -LN --        Exercise 12    Exercise Name 12 Standing calf raises  -LN --     Cueing 12 Verbal;Tactile;Demo  -LN --     Reps 12 30  -LN --        Exercise 13    Exercise Name 13 LAQ  -LN --     Cueing 13 Verbal;Tactile;Demo  -LN --     Reps 13 30  -LN --     Time 13 2.5#  -LN --        Exercise 14    Exercise Name 14 standing HS curls  -LN --     Cueing 14 Verbal;Tactile;Demo  -LN --     Reps 14 30  -LN --     Time 14 2-3 sec  -LN --        Exercise 15    Exercise Name 15 Forward step ups- 6 inch step  -LN --     Cueing 15 Verbal;Tactile;Demo  -LN --     Reps 15 30 each  -LN --        Exercise 16    Exercise Name 16 Prone hip extension  -LN --     Cueing 16 Verbal;Tactile;Demo  -LN --     Reps 16 30  -LN --     Time 16 2 sec  -LN --     Additional Comments 2.5#  -LN --        Exercise 17    Exercise Name 17 TKE vs TB  -LN --     Cueing 17 Verbal;Tactile;Demo  -LN --     Reps 17 30  -LN --     Time 17 5 sec  -LN --     Additional Comments gold  -LN --        Exercise 18    Exercise Name 18 Lateral step up and over- 6 inch step  -LN --     Cueing 18 Tactile;Demo;Verbal  -LN --     Reps 18 30  -LN --        Exercise 19    Exercise Name 19 Step knee flexion stretch  -LN --     Cueing 19 Verbal;Tactile;Demo  -LN --     Reps 19 10  -LN --     Time 19 20 sec  -LN --               User Key  (r) = Recorded By, (t) = Taken By, (c) = Cosigned By      Initials Name Provider Type    LN Gloria Kan, PT Physical Therapist                             Manual Rx (last 36 hours)       Manual Treatments       Row Name 09/26/23 1500             Total Minutes    58134 - PT Manual Therapy Minutes 10  -LN         Manual Rx 2    Manual Rx 2 Location Seated passive right knee flexion  -LN      Manual Rx 2 Duration 10 x 10 sec  -LN         Manual Rx 3    Manual Rx 3 Location Posterior tibial mobs  -LN       Manual Rx 3 Duration throughout PROM Flexion  -LN                User Key  (r) = Recorded By, (t) = Taken By, (c) = Cosigned By      Initials Name Provider Type    Gloria Wolf, PT Physical Therapist                        Therapy Education  Given: HEP, Symptoms/condition management  Program: Reinforced  How Provided: Verbal  Provided to: Patient  Level of Understanding: Teach back education performed, Verbalized, Demonstrated              Time Calculation:   Start Time: 1615  Stop Time: 1737  Time Calculation (min): 82 min  Timed Charges  14185 - PT Therapeutic Exercise Minutes: 45  24182 - PT Manual Therapy Minutes: 10  Untimed Charges  PT Moist Heat Minutes: 12  Total Minutes  Timed Charges Total Minutes: 45  Untimed Charges Total Minutes: 12   Total Minutes: 57  Therapy Charges for Today       Code Description Service Date Service Provider Modifiers Qty    [id number]  PT THER PROC EA 15 MIN 9/26/2023 Gloria Kan, PT GP 3    [id number] HC PT MANUAL THERAPY EA 15 MIN 9/26/2023 Gloria Kan, PT GP 1                      Gloria Kan, PT  9/26/2023

## 2023-10-04 ENCOUNTER — HOSPITAL ENCOUNTER (OUTPATIENT)
Dept: PHYSICAL THERAPY | Facility: HOSPITAL | Age: 43
Setting detail: THERAPIES SERIES
Discharge: HOME | End: 2023-10-04
Payer: COMMERCIAL

## 2023-10-04 DIAGNOSIS — Z96.651 STATUS POST RIGHT PARTIAL KNEE REPLACEMENT: Primary | ICD-10-CM

## 2023-10-04 PROCEDURE — 97140 MANUAL THERAPY 1/> REGIONS: CPT

## 2023-10-04 PROCEDURE — 97110 THERAPEUTIC EXERCISES: CPT

## 2023-10-04 NOTE — THERAPY TREATMENT NOTE
"  Outpatient Physical Therapy Ortho Treatment Note   [place]     Patient Name: Aida Peralta  : 1980  MRN: [id number]  Today's Date: 10/4/2023      Visit Date: 10/04/2023    Visit Dx:    ICD-10-CM ICD-9-CM   1. Status post right partial knee replacement  Z96.651 V43.65       Patient Active Problem List   Diagnosis    YULIA (generalized anxiety disorder)    History of seizure    Hypertension    Hypothyroidism    Primary osteoarthritis of both knees    Morbid obesity    Plica of knee, left    Adjustment disorder    Status post right partial knee replacement        Past Medical History:   Diagnosis Date    CTS (carpal tunnel syndrome)     GERD (gastroesophageal reflux disease)     Hypertension     Migraines     Plica of knee, left 10/22/2021    Primary osteoarthritis of both knees 10/22/2021    Seizure     in 2018    Sleep apnea     no machine        Past Surgical History:   Procedure Laterality Date    CHOLECYSTECTOMY      KNEE ARTHROPLASTY, PARTIAL REPLACEMENT Right 2023    Procedure: KNEE ARTHROPLASTY, PARTIAL REPLACEMENT;  Surgeon: Siddhartha Pretty MD;  Location: [facility] MAIN OR;  Service: Robotics - Ortho;  Laterality: Right;    TUBAL ABDOMINAL LIGATION          PT Ortho       Row Name 10/04/23 1600       Subjective    Subjective Comments \"My knee is doing okay.\"  -LN       Subjective Pain    Able to rate subjective pain? yes  -LN    Pre-Treatment Pain Level 3  -LN       Right Lower Ext    Rt Knee Extension/Flexion AROM 107 degrees seated knee flexion post stretch; 0 degrees knee extension post-stretch  -LN              User Key  (r) = Recorded By, (t) = Taken By, (c) = Cosigned By      Initials Name Provider Type    LN Gloria Kan, PT Physical Therapist                                 PT Assessment/Plan       Row Name 10/04/23 1600          PT Assessment    Assessment Comments Pt continues to report mild right knee pain and reports recent increase in sensitivity on incision; which may " "be due to nerve endings regenerating. She continues to do well with exercises. Decreased limp noted; only nominal noted.  -LN        PT Plan    PT Frequency 1x/week  -LN     PT Plan Comments Cont per POC; has 3 more visits approved by insurance.  -LN               User Key  (r) = Recorded By, (t) = Taken By, (c) = Cosigned By      Initials Name Provider Type    Gloria Wolf, PT Physical Therapist                     Modalities       Row Name 10/04/23 1500             Moist Heat    MH Applied Yes  -LN      Location Anterior and Posterior R knee/leg with pt supine.  -LN      PT Moist Heat Minutes 12  -LN      MH Prior to Rx Yes  -LN         Ice    Patient denies application of Ice Yes  -LN      Patient reports will apply ice at home to involved area Yes  -LN                User Key  (r) = Recorded By, (t) = Taken By, (c) = Cosigned By      Initials Name Provider Type    Gloria Wolf, PT Physical Therapist                   OP Exercises       Row Name 10/04/23 1600 10/04/23 1500          Precautions    Existing Precautions/Restrictions no known precautions/restrictions  -LN --        Subjective    Subjective Comments \"My knee is doing okay.\"  -LN --        Subjective Pain    Able to rate subjective pain? yes  -LN --     Pre-Treatment Pain Level 3  -LN --        Total Minutes    38104 - PT Therapeutic Exercise Minutes 45  -LN --     46153 - PT Manual Therapy Minutes -- 10  -LN        Exercise 1    Exercise Name 1 Heelslides with sheet  -LN --     Cueing 1 Verbal;Tactile;Demo  -LN --     Time 1 8 min  -LN --        Exercise 2    Exercise Name 2 Wallslides  -LN --     Cueing 2 Verbal;Tactile;Demo  -LN --     Time 2 8 min  -LN --        Exercise 3    Exercise Name 3 Airdyne - seat 0  -LN --     Cueing 3 Verbal  -LN --     Time 3 10 minutes  -LN --        Exercise 4    Exercise Name 4 supine HS stretch with sheet  -LN --     Cueing 4 Verbal;Tactile;Demo  -LN --     Reps 4 10  -LN --     Time 4 10 " sec  -LN --        Exercise 5    Exercise Name 5 QS over single towel roll  -LN --     Cueing 5 Verbal;Tactile;Demo  -LN --     Reps 5 30  -LN --     Time 5 5 sec  -LN --        Exercise 7    Exercise Name 7 SAQ  -LN --     Cueing 7 Verbal;Tactile;Demo  -LN --     Reps 7 30  -LN --     Time 7 5 sec  -LN --     Additional Comments 2.5#  -LN --        Exercise 8    Exercise Name 8 SLR  -LN --     Cueing 8 Verbal;Tactile;Demo  -LN --     Reps 8 30  -LN --     Time 8 2 sec  -LN --     Additional Comments 2.5#  -LN --        Exercise 9    Exercise Name 9 S/L hip abduction  -LN --     Cueing 9 Verbal;Tactile;Demo  -LN --     Reps 9 30  -LN --     Time 9 2 sec  -LN --     Additional Comments 2.5#  -LN --        Exercise 10    Exercise Name 10 Prone knee hang  -LN --     Cueing 10 Verbal;Tactile;Demo  -LN --     Time 10 5 minutes  -LN --        Exercise 11    Exercise Name 11 sidelying hip adduction  -LN --     Cueing 11 Verbal;Tactile;Demo  -LN --     Reps 11 30  -LN --     Time 11 2 sec  -LN --     Additional Comments 2.5#  -LN --        Exercise 12    Exercise Name 12 Standing calf raises  -LN --     Cueing 12 Verbal;Tactile;Demo  -LN --     Reps 12 30  -LN --        Exercise 13    Exercise Name 13 LAQ  -LN --     Cueing 13 Verbal;Tactile;Demo  -LN --     Reps 13 30  -LN --     Time 13 2.5#  -LN --        Exercise 14    Exercise Name 14 standing HS curls  -LN --     Cueing 14 Verbal;Tactile;Demo  -LN --     Reps 14 30  -LN --     Time 14 2-3 sec  -LN --        Exercise 15    Exercise Name 15 Forward step ups- 6 inch step  -LN --     Cueing 15 Verbal;Tactile;Demo  -LN --     Reps 15 30 each  -LN --        Exercise 16    Exercise Name 16 Prone hip extension  -LN --     Cueing 16 Verbal;Tactile;Demo  -LN --     Reps 16 30  -LN --     Time 16 2 sec  -LN --     Additional Comments 2.5#  -LN --        Exercise 17    Exercise Name 17 TKE vs TB  -LN --     Cueing 17 Verbal;Tactile;Demo  -LN --     Reps 17 30  -LN --     Time 17  5 sec  -LN --     Additional Comments gold  -LN --        Exercise 18    Exercise Name 18 Lateral step up and over- 6 inch step  -LN --     Cueing 18 Tactile;Demo;Verbal  -LN --     Reps 18 30  -LN --        Exercise 19    Exercise Name 19 Step knee flexion stretch  -LN --     Cueing 19 Verbal;Tactile;Demo  -LN --     Reps 19 10  -LN --     Time 19 20 sec  -LN --               User Key  (r) = Recorded By, (t) = Taken By, (c) = Cosigned By      Initials Name Provider Type    LN Gloria Kan, PT Physical Therapist                             Manual Rx (last 36 hours)       Manual Treatments       Row Name 10/04/23 1500             Total Minutes    64244 - PT Manual Therapy Minutes 10  -LN         Manual Rx 2    Manual Rx 2 Location Seated passive right knee flexion  -LN      Manual Rx 2 Duration 10 x 10 sec  -LN         Manual Rx 3    Manual Rx 3 Location Posterior tibial mobs  -LN      Manual Rx 3 Duration throughout PROM Flexion  -LN                User Key  (r) = Recorded By, (t) = Taken By, (c) = Cosigned By      Initials Name Provider Type    LN Gloria Kan, PT Physical Therapist                        Therapy Education  Given: HEP, Symptoms/condition management  Program: Reinforced  How Provided: Verbal  Provided to: Patient  Level of Understanding: Teach back education performed, Verbalized, Demonstrated              Time Calculation:   Start Time: 1555  Stop Time: 1727  Time Calculation (min): 92 min  Timed Charges  06721 - PT Therapeutic Exercise Minutes: 45  22019 - PT Manual Therapy Minutes: 10  Untimed Charges  PT Moist Heat Minutes: 12  Total Minutes  Timed Charges Total Minutes: 45  Untimed Charges Total Minutes: 12   Total Minutes: 45  Therapy Charges for Today       Code Description Service Date Service Provider Modifiers Qty    37309809458 HC PT THER PROC EA 15 MIN 10/4/2023 Gloria Kan, PT GP 3    20036479044 HC PT MANUAL THERAPY EA 15 MIN 10/4/2023 Gloria Kan  Jessi, PT GP 1                      Gloria Kan, PT  10/4/2023

## 2023-10-11 ENCOUNTER — DOCUMENTATION (OUTPATIENT)
Dept: PHYSICAL THERAPY | Facility: HOSPITAL | Age: 43
End: 2023-10-11
Payer: COMMERCIAL

## 2023-10-11 NOTE — SIGNIFICANT NOTE
Pt did not show for scheduled PT appointment on Tuesday, 10/10/23. No further PT appointments are scheduled at this time.

## 2023-10-16 DIAGNOSIS — Z96.651 STATUS POST RIGHT PARTIAL KNEE REPLACEMENT: ICD-10-CM

## 2023-10-16 RX ORDER — OXYCODONE HYDROCHLORIDE AND ACETAMINOPHEN 5; 325 MG/1; MG/1
1 TABLET ORAL NIGHTLY PRN
Qty: 20 TABLET | Refills: 0 | OUTPATIENT
Start: 2023-10-16

## 2023-10-26 NOTE — TELEPHONE ENCOUNTER
Caller: Aida Peralta    Relationship: Self    Best call back number: [phone]    What form or medical record are you requesting: FMLA    Who is requesting this form or medical record from you: EMPLOYER    How would you like to receive the form or medical records (pick-up, mail, fax):     Timeframe paperwork needed: ASAP    Additional notes:  PATIENT DROPPED OFF WRONG PAPERWORK AT THE BEGINNING OF June. WHEN SHE PICKED IT UP SHE REALIZED IT WAS THE WRONG FORM. PATIENT FAXED THE CORRECT PAPERWORK -[phone]. PATIENT STILL HASN'T HEARD ANYTHING BACK ABOUT THE CORRECT FMLA PAPER SHE FAXED INTO [employer]. PLEASE GIVE PATIENT A CALL BACK.          25-Oct-2023 18:56 26-Oct-2023 04:41

## 2023-11-29 ENCOUNTER — OFFICE VISIT (OUTPATIENT)
Dept: ORTHOPEDIC SURGERY | Facility: CLINIC | Age: 43
End: 2023-11-29
Payer: COMMERCIAL

## 2023-11-29 VITALS — BODY MASS INDEX: 37.95 KG/M2 | WEIGHT: 201 LBS | HEIGHT: 61 IN | RESPIRATION RATE: 20 BRPM | OXYGEN SATURATION: 99 %

## 2023-11-29 DIAGNOSIS — M17.0 PRIMARY OSTEOARTHRITIS OF BOTH KNEES: ICD-10-CM

## 2023-11-29 DIAGNOSIS — G89.29 CHRONIC PAIN OF RIGHT KNEE: Primary | ICD-10-CM

## 2023-11-29 DIAGNOSIS — Z01.818 PRE-OP EVALUATION: Primary | ICD-10-CM

## 2023-11-29 DIAGNOSIS — M25.562 CHRONIC PAIN OF LEFT KNEE: ICD-10-CM

## 2023-11-29 DIAGNOSIS — M25.561 CHRONIC PAIN OF RIGHT KNEE: Primary | ICD-10-CM

## 2023-11-29 DIAGNOSIS — G89.29 CHRONIC PAIN OF LEFT KNEE: ICD-10-CM

## 2023-11-29 RX ORDER — SODIUM CHLORIDE 0.9 % (FLUSH) 0.9 %
10 SYRINGE (ML) INJECTION EVERY 12 HOURS SCHEDULED
OUTPATIENT
Start: 2023-11-29

## 2023-11-29 RX ORDER — SODIUM CHLORIDE 9 MG/ML
40 INJECTION, SOLUTION INTRAVENOUS AS NEEDED
OUTPATIENT
Start: 2023-11-29

## 2023-11-29 RX ORDER — SODIUM CHLORIDE 0.9 % (FLUSH) 0.9 %
10 SYRINGE (ML) INJECTION AS NEEDED
OUTPATIENT
Start: 2023-11-29

## 2023-11-29 NOTE — PROGRESS NOTES
[facility]  Post Operative Visit      Patient Name: Aida Peralta  : 1980  Primary Care Physician: Aida Sheldon APRN        Chief Complaint:  S/P right knee partial arthroplasty with Dr. Pretty on 23.    HPI:   Aida Peralta is a 43 y.o. who presents today for postoperative evaluation.     Right knee is recovering well. Flexion has improved to 120 degrees. She still has occasional pain in the right knee, but overall improved.     She would like to discuss left knee surgical intervention. She is tearful describing pain. Described as constant, severe. Unable to maneuver steps without severe discomfort. Injections and PT has not been successful in the past.       Past Medical/Surgical, Social and Family History:  I have reviewed and/or updated pertinent history as noted in the medical record including:  Past Medical History:   Diagnosis Date    CTS (carpal tunnel syndrome)     GERD (gastroesophageal reflux disease)     Hypertension     Migraines     Plica of knee, left 10/22/2021    Primary osteoarthritis of both knees 10/22/2021    Seizure     in 2018    Sleep apnea     no machine     Past Surgical History:   Procedure Laterality Date    CHOLECYSTECTOMY      KNEE ARTHROPLASTY, PARTIAL REPLACEMENT Right 2023    Procedure: KNEE ARTHROPLASTY, PARTIAL REPLACEMENT;  Surgeon: Siddhartha Pretty MD;  Location: [facility] MAIN OR;  Service: Robotics - Ortho;  Laterality: Right;    TUBAL ABDOMINAL LIGATION       Social History     Occupational History    Not on file   Tobacco Use    Smoking status: Never    Smokeless tobacco: Never   Vaping Use    Vaping Use: Never used   Substance and Sexual Activity    Alcohol use: Yes     Alcohol/week: 2.0 standard drinks of alcohol     Types: 2 Glasses of wine per week    Drug use: Never    Sexual activity: Yes     Partners: Male     Birth control/protection: Tubal ligation      Social History     Social History Narrative    Not on file     Family History   Problem  Relation Age of Onset    Cancer Father     Diabetes Father        Allergies:   Allergies   Allergen Reactions    Lisinopril Itching       Medications:   Home Medications:  Current Outpatient Medications on File Prior to Visit   Medication Sig    acetaminophen-codeine (TYLENOL/CODEINE #3) 300-30 MG per tablet Take 1-2 tablets by mouth Every 6 (Six) Hours As Needed for Moderate Pain.    apixaban (ELIQUIS) 2.5 MG tablet tablet Take 1 tablet by mouth 2 (Two) Times a Day.    buPROPion XL (WELLBUTRIN XL) 300 MG 24 hr tablet 1 tablet Daily. Indications: Anxiety disorder    busPIRone (BUSPAR) 15 MG tablet Take 0.5 tablets by mouth 3 (Three) Times a Day As Needed (anxiety). Indications: Anxiety Disorder    doxycycline (VIBRAMYCIN) 100 MG capsule     ondansetron (Zofran) 4 MG tablet Take 1 tablet by mouth Every 6 (Six) Hours As Needed for Nausea or Vomiting.    ondansetron (Zofran) 4 MG tablet Take 1 tablet by mouth Every 8 (Eight) Hours As Needed for Nausea or Vomiting.    pantoprazole (PROTONIX) 40 MG EC tablet Take 1 tablet by mouth Daily. Indications: Gastroesophageal Reflux Disease    triamterene-hydrochlorothiazide (DYAZIDE) 37.5-25 MG per capsule Take 1 capsule by mouth Daily. Indications: High Blood Pressure Disorder    oxyCODONE-acetaminophen (PERCOCET) 5-325 MG per tablet Take 1 tablet by mouth At Night As Needed for Severe Pain. (Patient not taking: Reported on 11/29/2023)     No current facility-administered medications on file prior to visit.         ROS:  14 point review of systems was negative except as listed in the HPI     Physical Exam:   43 y.o. female  Body mass index is 37.98 kg/m²., 91.2 kg (201 lb)  Vitals:    11/29/23 0929   Resp: 20   SpO2: 99%         General: Alert, cooperative, appears well and in no observable distress.   HEENT: Normocephalic, atraumatic on external visual inspection. No icterus.   CV: No significant peripheral edema.   Respiratory: Normal respiratory effort.   Skin: Warm & well  perfused; appropriate skin turgor.  Psych: Appropriate mood & affect.  Neuro: Gross sensation and motor intact in affected extremity/extremities.  Vascular: Peripheral pulses palpable in affected extremity/extremities. Calves/compartments soft and nontender, no evidence of DVT or compartment syndrome.    Ortho Exam          Investigations:  X-Ray Report:  Bilateral knee(s) X-Ray  Indication: Evaluation of pain and post op  AP, Lateral views  Findings: right knee, s/p partial knee arthroplasty. Good anatomical alignment with normal hardware positioning. Left knee with moderate to severe medial joint space narrowing  Bony lesion: no  Soft tissues: within normal limits  Joint spaces: decreased  Hardware appropriately positioned: yes  Prior studies available for comparison: yes   X-RAY was ordered and reviewed by HARRIET Singer               Assessment:  Diagnoses and all orders for this visit:    1. Chronic pain of right knee (Primary)  -     XR Knee 3 View Bilateral    2. Chronic pain of left knee  -     XR Knee 3 View Bilateral       Body mass index is 37.98 kg/m².  BMI consistent with Obese Class II: 35-39.9kg/m2        Plan:  Regarding the left knee, patient wishes to proceed with surgical intervention. Conservative measures have been unsuccessful in the past.   We reviewed imaging today in detail. Given findings on imaging and failure of conservative measures in the past, we discussed surgical intervention in detail.     Time was spent today discussing the surgical procedure in detail, as well as the expected perioperative course.     Risks and benefits of surgery were discussed with the patient today, which include but are not limited to, anesthesia complications, infection, DVT, PE, MI, stroke, neurovascular injury, tendon or ligament damage, leg length discrepancy, dislocation, wound healing complications, continued pain, hardware failure, the need for more surgery, and/or the remote possibility of death.         Regarding the right knee, healing as expected with normal post op recovery.       Will place orders for partial knee replacement on the left. Follow up post operatively.       HARRIET Singer

## 2023-12-08 ENCOUNTER — DOCUMENTATION (OUTPATIENT)
Dept: ORTHOPEDIC SURGERY | Facility: CLINIC | Age: 43
End: 2023-12-08
Payer: COMMERCIAL

## 2023-12-15 ENCOUNTER — LAB (OUTPATIENT)
Dept: LAB | Facility: HOSPITAL | Age: 43
End: 2023-12-15
Payer: COMMERCIAL

## 2023-12-15 ENCOUNTER — HOSPITAL ENCOUNTER (OUTPATIENT)
Dept: CARDIOLOGY | Facility: HOSPITAL | Age: 43
Discharge: HOME | End: 2023-12-15
Payer: COMMERCIAL

## 2023-12-15 ENCOUNTER — HOSPITAL ENCOUNTER (OUTPATIENT)
Dept: GENERAL RADIOLOGY | Facility: HOSPITAL | Age: 43
Discharge: HOME | End: 2023-12-15
Payer: COMMERCIAL

## 2023-12-15 ENCOUNTER — PRE-ADMISSION TESTING (OUTPATIENT)
Dept: PREADMISSION TESTING | Facility: HOSPITAL | Age: 43
End: 2023-12-15
Payer: COMMERCIAL

## 2023-12-15 VITALS
HEART RATE: 82 BPM | WEIGHT: 194 LBS | HEIGHT: 62 IN | BODY MASS INDEX: 35.7 KG/M2 | RESPIRATION RATE: 16 BRPM | TEMPERATURE: 98.1 F | OXYGEN SATURATION: 95 %

## 2023-12-15 DIAGNOSIS — Z01.818 PRE-OP EVALUATION: ICD-10-CM

## 2023-12-15 LAB
ABO GROUP BLD: NORMAL
ANION GAP SERPL CALCULATED.3IONS-SCNC: 12.6 MMOL/L (ref 5–15)
BASOPHILS # BLD AUTO: 0.05 10*3/MM3 (ref 0–0.2)
BASOPHILS NFR BLD AUTO: 0.6 % (ref 0–1.5)
BILIRUB UR QL STRIP: NEGATIVE
BLD GP AB SCN SERPL QL: NEGATIVE
BUN SERPL-MCNC: 11 MG/DL (ref 6–20)
BUN/CREAT SERPL: 12.1 (ref 7–25)
CALCIUM SPEC-SCNC: 10.5 MG/DL (ref 8.6–10.5)
CHLORIDE SERPL-SCNC: 103 MMOL/L (ref 98–107)
CLARITY UR: CLEAR
CO2 SERPL-SCNC: 23.4 MMOL/L (ref 22–29)
COLOR UR: YELLOW
CREAT SERPL-MCNC: 0.91 MG/DL (ref 0.57–1)
DEPRECATED RDW RBC AUTO: 39.9 FL (ref 37–54)
EGFRCR SERPLBLD CKD-EPI 2021: 81.4 ML/MIN/1.73
EOSINOPHIL # BLD AUTO: 0.11 10*3/MM3 (ref 0–0.4)
EOSINOPHIL NFR BLD AUTO: 1.3 % (ref 0.3–6.2)
ERYTHROCYTE [DISTWIDTH] IN BLOOD BY AUTOMATED COUNT: 13 % (ref 12.3–15.4)
GLUCOSE SERPL-MCNC: 94 MG/DL (ref 65–99)
GLUCOSE UR STRIP-MCNC: NEGATIVE MG/DL
HCG INTACT+B SERPL-ACNC: <1 MIU/ML
HCT VFR BLD AUTO: 38 % (ref 34–46.6)
HGB BLD-MCNC: 13.1 G/DL (ref 12–15.9)
HGB UR QL STRIP.AUTO: NEGATIVE
HOLD SPECIMEN: NORMAL
IMM GRANULOCYTES # BLD AUTO: 0.02 10*3/MM3 (ref 0–0.05)
IMM GRANULOCYTES NFR BLD AUTO: 0.2 % (ref 0–0.5)
KETONES UR QL STRIP: NEGATIVE
LEUKOCYTE ESTERASE UR QL STRIP.AUTO: NEGATIVE
LYMPHOCYTES # BLD AUTO: 2.16 10*3/MM3 (ref 0.7–3.1)
LYMPHOCYTES NFR BLD AUTO: 24.9 % (ref 19.6–45.3)
MCH RBC QN AUTO: 29.4 PG (ref 26.6–33)
MCHC RBC AUTO-ENTMCNC: 34.5 G/DL (ref 31.5–35.7)
MCV RBC AUTO: 85.2 FL (ref 79–97)
MONOCYTES # BLD AUTO: 0.7 10*3/MM3 (ref 0.1–0.9)
MONOCYTES NFR BLD AUTO: 8.1 % (ref 5–12)
MRSA DNA SPEC QL NAA+PROBE: NORMAL
NEUTROPHILS NFR BLD AUTO: 5.65 10*3/MM3 (ref 1.7–7)
NEUTROPHILS NFR BLD AUTO: 64.9 % (ref 42.7–76)
NITRITE UR QL STRIP: NEGATIVE
NRBC BLD AUTO-RTO: 0 /100 WBC (ref 0–0.2)
PH UR STRIP.AUTO: 6.5 [PH] (ref 5–8)
PLATELET # BLD AUTO: 353 10*3/MM3 (ref 140–450)
PMV BLD AUTO: 11.3 FL (ref 6–12)
POTASSIUM SERPL-SCNC: 4.1 MMOL/L (ref 3.5–5.2)
PROT UR QL STRIP: NEGATIVE
QT INTERVAL: 362 MS
QTC INTERVAL: 415 MS
RBC # BLD AUTO: 4.46 10*6/MM3 (ref 3.77–5.28)
RH BLD: NEGATIVE
SODIUM SERPL-SCNC: 139 MMOL/L (ref 136–145)
SP GR UR STRIP: 1.01 (ref 1–1.03)
T&S EXPIRATION DATE: NORMAL
UROBILINOGEN UR QL STRIP: NORMAL
WBC NRBC COR # BLD AUTO: 8.69 10*3/MM3 (ref 3.4–10.8)

## 2023-12-15 PROCEDURE — 86850 RBC ANTIBODY SCREEN: CPT

## 2023-12-15 PROCEDURE — 97162 PT EVAL MOD COMPLEX 30 MIN: CPT

## 2023-12-15 PROCEDURE — 93005 ELECTROCARDIOGRAM TRACING: CPT | Performed by: NURSE PRACTITIONER

## 2023-12-15 PROCEDURE — 87641 MR-STAPH DNA AMP PROBE: CPT

## 2023-12-15 PROCEDURE — 86901 BLOOD TYPING SEROLOGIC RH(D): CPT

## 2023-12-15 PROCEDURE — 86900 BLOOD TYPING SEROLOGIC ABO: CPT

## 2023-12-15 PROCEDURE — 85025 COMPLETE CBC W/AUTO DIFF WBC: CPT

## 2023-12-15 PROCEDURE — 80048 BASIC METABOLIC PNL TOTAL CA: CPT

## 2023-12-15 PROCEDURE — 84702 CHORIONIC GONADOTROPIN TEST: CPT

## 2023-12-15 PROCEDURE — 81003 URINALYSIS AUTO W/O SCOPE: CPT

## 2023-12-15 PROCEDURE — 36415 COLL VENOUS BLD VENIPUNCTURE: CPT

## 2023-12-15 PROCEDURE — 71046 X-RAY EXAM CHEST 2 VIEWS: CPT

## 2023-12-15 NOTE — PLAN OF CARE
Goal Outcome Evaluation:  Plan of Care Reviewed With: patient        Progress: no change   Pt is a 42 y/o F presenting to PAT this date with scheduled partial TKA being performed by Dr. Pretty on 12/19/23. Pt had previously failed conservative management of L Knee OA and has now elected for surgical intervention. Pt is currently living with significant other and children in [facility] with no steps to enter, flight to basement laundry. Working full-time for [employer]. She had R Knee replacement on 6/2/23 with Dr. Pretty. AROM of the L Knee:  degrees. Education was provided on what to expect on day of surgery, icing protocol, ambulation distance/intensity, safe household navigation, d/c planning, and goals for physical therapy. PT is recommending starting with OPPT services at d/c with sig other transporting. PT will follow-up post-operatively to complete re-evaluation to ensure a safe d/c from [facility]. Will not req RW at d/c and planning for same-day d/c from hospital.    Anticipated Discharge Disposition (PT): home with outpatient therapy services

## 2023-12-15 NOTE — PAT
B/P elevated in PAT (149/99), Pt. stated B/P has been running high lately and believes it is related to caffeinated beverages, which she agreed to decrease prior to surgery. Communicated this info with Dr. Pretty's APRNJEFFERSON.

## 2023-12-15 NOTE — THERAPY EVALUATION
Patient Name: Aida Peralta  : 1980    MRN: [id number]                              Today's Date: 12/15/2023       Admit Date: (Not on file)    Visit Dx: No diagnosis found.  Patient Active Problem List   Diagnosis    YULIA (generalized anxiety disorder)    History of seizure    Hypertension    Hypothyroidism    Primary osteoarthritis of both knees    Morbid obesity    Plica of knee, left    Adjustment disorder    Status post right partial knee replacement     Past Medical History:   Diagnosis Date    CTS (carpal tunnel syndrome)     GERD (gastroesophageal reflux disease)     Hypertension     Migraines     Plica of knee, left 10/22/2021    Primary osteoarthritis of both knees 10/22/2021    Seizure     one in  2018, no meds    Sleep apnea     no machine     Past Surgical History:   Procedure Laterality Date    CHOLECYSTECTOMY      KNEE ARTHROPLASTY, PARTIAL REPLACEMENT Right 2023    Procedure: KNEE ARTHROPLASTY, PARTIAL REPLACEMENT;  Surgeon: Siddhartha Pretty MD;  Location: [facility] OR;  Service: Robotics - Ortho;  Laterality: Right;    TUBAL ABDOMINAL LIGATION        General Information       Row Name 12/15/23 130          Physical Therapy Time and Intention    Document Type evaluation  -MB     Mode of Treatment physical therapy  -MB       Row Name 12/15/23 1303          General Information    Patient Profile Reviewed yes  -MB     Prior Level of Function independent:;all household mobility;community mobility;gait;transfer;ADL's;home management;driving;work  -MB     Existing Precautions/Restrictions no known precautions/restrictions  -MB     Barriers to Rehab none identified  -MB       Row Name 12/15/23 1300          Living Environment    People in Home significant other;child(marcio), dependent  -MB       Row Name 12/15/23 1300          Home Main Entrance    Number of Stairs, Main Entrance none  -MB       Row Name 12/15/23 1307          Stairs Within Home, Primary    Stairs, Within Home, Primary flight  of steps to basement  -MB     Stair Railings, Within Home, Primary railings safe and in good condition  -MB       Row Name 12/15/23 1307          Cognition    Orientation Status (Cognition) oriented x 4  -MB       Row Name 12/15/23 1307          Safety Issues, Functional Mobility    Impairments Affecting Function (Mobility) balance;pain;endurance/activity tolerance;range of motion (ROM);strength  -MB               User Key  (r) = Recorded By, (t) = Taken By, (c) = Cosigned By      Initials Name Provider Type    Vahe Fink, PT Physical Therapist                   Mobility       Row Name 12/15/23 1308          Bed-Chair Transfer    Bed-Chair [place] (Transfers) independent  -MB     Comment, (Bed-Chair Transfer) no AD  -MB       Row Name 12/15/23 1308          Sit-Stand Transfer    Sit-Stand [place] (Transfers) independent  -MB     Comment, (Sit-Stand Transfer) no AD  -MB       Row Name 12/15/23 1308          Gait/Stairs (Locomotion)    [place] Level (Gait) independent  -MB     Patient was able to Ambulate yes  -MB               User Key  (r) = Recorded By, (t) = Taken By, (c) = Cosigned By      Initials Name Provider Type    Vahe Fink, PT Physical Therapist                   Obj/Interventions       Row Name 12/15/23 1309          Range of Motion Comprehensive    Comment, General Range of Motion AROM L Knee:  degrees  -MB       Row Name 12/15/23 1309          Balance    Balance Assessment sitting static balance;sitting dynamic balance;sit to stand dynamic balance;standing dynamic balance;standing static balance  -MB     Static Sitting Balance independent  -MB     Dynamic Sitting Balance independent  -MB     Position, Sitting Balance supported;sitting in chair  -MB     Sit to Stand Dynamic Balance independent  -MB     Static Standing Balance independent  -MB     Dynamic Standing Balance independent  -MB       Row Name 12/15/23 1309          Sensory Assessment (Somatosensory)    Sensory  Assessment (Somatosensory) sensation intact  -MB               User Key  (r) = Recorded By, (t) = Taken By, (c) = Cosigned By      Initials Name Provider Type    Vahe Fnik, PT Physical Therapist                   Goals/Plan       Row Name 12/15/23 1310          Bed Mobility Goal 1 (PT)    Activity/Assistive Device (Bed Mobility Goal 1, PT) bed mobility activities, all  -MB     [place] Level/Cues Needed (Bed Mobility Goal 1, PT) independent  -MB     Time Frame (Bed Mobility Goal 1, PT) long term goal (LTG);2 weeks  -MB       Row Name 12/15/23 1310          Transfer Goal 1 (PT)    Activity/Assistive Device (Transfer Goal 1, PT) transfers, all;walker, rolling  -MB     [place] Level/Cues Needed (Transfer Goal 1, PT) standby assist  -MB     Time Frame (Transfer Goal 1, PT) long term goal (LTG);2 weeks  -MB       Row Name 12/15/23 1310          Gait Training Goal 1 (PT)    Activity/Assistive Device (Gait Training Goal 1, PT) gait (walking locomotion);walker, rolling  -MB     [place] Level (Gait Training Goal 1, PT) standby assist  -MB     Distance (Gait Training Goal 1, PT) 100  -MB     Time Frame (Gait Training Goal 1, PT) long term goal (LTG);2 weeks  -MB       Row Name 12/15/23 1310          ROM Goal 1 (PT)    ROM Goal 1 (PT) AROM L Knee: 0-120 degrees  -MB     Time Frame (ROM Goal 1, PT) long-term goal (LTG);2 weeks  -MB       Row Name 12/15/23 1310          Patient Education Goal (PT)    Activity (Patient Education Goal, PT) IND with HEP  -MB     Time Frame (Patient Education Goal, PT) long term goal (LTG);2 weeks  -MB       Row Name 12/15/23 1310          Therapy Assessment/Plan (PT)    Planned Therapy Interventions (PT) balance training;bed mobility training;gait training;home exercise program;neuromuscular re-education;ROM (range of motion);strengthening;transfer training;patient/family education  -MB               User Key  (r) = Recorded By, (t) = Taken By, (c) = Cosigned By      Initials  Name Provider Type    Vahe Fink, PT Physical Therapist                   Clinical Impression       Row Name 12/15/23 1309          Pain    Pretreatment Pain Rating 5/10  -MB     Posttreatment Pain Rating 5/10  -MB     Pain Location - Side/Orientation Left  -MB     Pain Location - knee  -MB       Row Name 12/15/23 1314 12/15/23 1305       Plan of Care Review    Plan of Care Reviewed With -- patient  -MB    Progress -- no change  -MB    Outcome Evaluation Pt is a 44 y/o F presenting to [facility] this date with scheduled partial TKA being performed by Dr. Pretty on 12/19/23. Pt had previously failed conservative management of L Knee OA and has now elected for surgical intervention. Pt is currently living with significant other and children in [facility] with no steps to enter, flight to basement laundry. Working full-time for [employer]. She had R Knee replacement on 6/2/23 with Dr. Pretty. AROM of the L Knee:  degrees. Education was provided on what to expect on day of surgery, icing protocol, ambulation distance/intensity, safe household navigation, d/c planning, and goals for physical therapy. PT is recommending starting with OPPT services at d/c with sig other transporting. PT will follow-up post-operatively to complete re-evaluation to ensure a safe d/c from [facility]. Will not req RW at d/c and planning for same-day d/c from hospital.  -MB --      Row Name 12/15/23 1303          Therapy Assessment/Plan (PT)    Rehab Potential (PT) good, to achieve stated therapy goals  -MB     Criteria for Skilled Interventions Met (PT) yes;meets criteria  -MB     Therapy Frequency (PT) 2 times/day  -MB     Predicted Duration of Therapy Intervention (PT) until d/c  -MB       Row Name 12/15/23 130          Vital Signs    O2 Delivery Pre Treatment room air  -MB     O2 Delivery Intra Treatment room air  -MB     O2 Delivery Post Treatment room air  -MB     Pre Patient Position Standing  -MB     Intra Patient Position Sitting  -MB     Post Patient  Position Standing  -MB               User Key  (r) = Recorded By, (t) = Taken By, (c) = Cosigned By      Initials Name Provider Type    Vahe Fink PT Physical Therapist                   Outcome Measures    No documentation.                                Physical Therapy Education       Title: PT OT SLP Therapies (In Progress)       Topic: Physical Therapy (In Progress)       Point: Mobility training (Done)       Learning Progress Summary             Patient Acceptance, E,TB, VU by MB at 12/15/2023 1312                         Point: Home exercise program (Done)       Learning Progress Summary             Patient Acceptance, E,TB, VU by MB at 12/15/2023 1312                         Point: Body mechanics (Not Started)       Learner Progress:  Not documented in this visit.              Point: Precautions (Done)       Learning Progress Summary             Patient Acceptance, E,TB, VU by MB at 12/15/2023 1312                                         User Key       Initials Effective Dates Name Provider Type Discipline    MB 06/06/23 -  Vahe Bai PT Physical Therapist PT                  PT Recommendation and Plan  Planned Therapy Interventions (PT): balance training, bed mobility training, gait training, home exercise program, neuromuscular re-education, ROM (range of motion), strengthening, transfer training, patient/family education  Plan of Care Reviewed With: patient  Progress: no change  Outcome Evaluation: Pt is a 42 y/o F presenting to [facility] this date with scheduled partial TKA being performed by Dr. Pretty on 12/19/23. Pt had previously failed conservative management of L Knee OA and has now elected for surgical intervention. Pt is currently living with significant other and children in [facility] with no steps to enter, flight to basement laundry. Working full-time for [employer]. She had R Knee replacement on 6/2/23 with Dr. Pretty. AROM of the L Knee:  degrees. Education was provided on what to expect on day of  surgery, icing protocol, ambulation distance/intensity, safe household navigation, d/c planning, and goals for physical therapy. PT is recommending starting with OPPT services at d/c with sig other transporting. PT will follow-up post-operatively to complete re-evaluation to ensure a safe d/c from [facility]. Will not req RW at d/c and planning for same-day d/c from hospital.     Time Calculation:   PT Evaluation Complexity  History, PT Evaluation Complexity: 1-2 personal factors and/or comorbidities  Examination of Body Systems (PT Eval Complexity): total of 3 or more elements  Clinical Presentation (PT Evaluation Complexity): evolving  Clinical Decision Making (PT Evaluation Complexity): moderate complexity  Overall Complexity (PT Evaluation Complexity): moderate complexity     PT Charges       Row Name 12/15/23 1313             Time Calculation    Start Time 1130  -MB      Stop Time 1150  -MB      Time Calculation (min) 20 min  -MB      PT Received On 12/15/23  -MB      PT - Next Appointment 12/19/23  -MB      PT Goal Re-Cert Due Date 12/29/23  -MB         Time Calculation- PT    Total Timed Code Minutes- PT 0 minute(s)  -MB                User Key  (r) = Recorded By, (t) = Taken By, (c) = Cosigned By      Initials Name Provider Type    Vahe Fink, PT Physical Therapist                  Therapy Charges for Today       Code Description Service Date Service Provider Modifiers Qty    11034665954 HC PT EVAL MOD COMPLEXITY 4 12/15/2023 Vahe Bai, PT GP 1               PT Discharge Summary  Anticipated Discharge Disposition (PT): home with outpatient therapy services    Vahe Bai PT  12/15/2023

## 2023-12-18 ENCOUNTER — ANESTHESIA EVENT (OUTPATIENT)
Dept: PERIOP | Facility: HOSPITAL | Age: 43
End: 2023-12-18
Payer: COMMERCIAL

## 2023-12-18 NOTE — ANESTHESIA PREPROCEDURE EVALUATION
Anesthesia Evaluation     Patient summary reviewed and Nursing notes reviewed   no history of anesthetic complications:   NPO Solid Status: > 8 hours  NPO Liquid Status: > 2 hours           Airway   Dental      Pulmonary    (+) ,sleep apnea  Cardiovascular     ECG reviewed  PT is on anticoagulation therapy    (+) hypertension      Neuro/Psych  (+) seizures, headaches, numbness, psychiatric history Anxiety  GI/Hepatic/Renal/Endo    (+) obesity, GERD, thyroid problem hypothyroidism    Musculoskeletal     Abdominal    Substance History      OB/GYN          Other   arthritis,     ROS/Med Hx Other: Additional History:      PSH:  TUBAL ABDOMINAL LIGATION CHOLECYSTECTOMY  KNEE ARTHROPLASTY, PARTIAL REPLACEMENT               Anesthesia Plan    ASA 3     ERAS Protocol and general with block   total IV anesthesia  (Patient identified; pre-operative vital signs, all relevant labs/studies, complete medical/surgical/anesthetic history, full medication list, full allergy list, and NPO status obtained/reviewed; physical assessment performed; anesthetic options, side effects, potential complications, risks, and benefits discussed; questions answered; written anesthesia consent obtained; patient cleared for procedure; anesthesia machine and equipment checked and functioning)  intravenous induction     Anesthetic plan, risks, benefits, and alternatives have been provided, discussed and informed consent has been obtained with: patient.    Plan discussed with CRNA and CAA.    CODE STATUS:

## 2023-12-19 ENCOUNTER — APPOINTMENT (OUTPATIENT)
Dept: GENERAL RADIOLOGY | Facility: HOSPITAL | Age: 43
End: 2023-12-19
Payer: COMMERCIAL

## 2023-12-19 ENCOUNTER — ANESTHESIA (OUTPATIENT)
Dept: PERIOP | Facility: HOSPITAL | Age: 43
End: 2023-12-19
Payer: COMMERCIAL

## 2023-12-19 ENCOUNTER — HOSPITAL ENCOUNTER (OUTPATIENT)
Facility: HOSPITAL | Age: 43
Setting detail: HOSPITAL OUTPATIENT SURGERY
Discharge: HOME | End: 2023-12-19
Attending: ORTHOPAEDIC SURGERY | Admitting: ORTHOPAEDIC SURGERY
Payer: COMMERCIAL

## 2023-12-19 ENCOUNTER — HOSPITAL ENCOUNTER (EMERGENCY)
Facility: HOSPITAL | Age: 43
Discharge: HOME | End: 2023-12-19
Attending: EMERGENCY MEDICINE | Admitting: EMERGENCY MEDICINE
Payer: COMMERCIAL

## 2023-12-19 VITALS
DIASTOLIC BLOOD PRESSURE: 82 MMHG | OXYGEN SATURATION: 95 % | TEMPERATURE: 98 F | HEART RATE: 90 BPM | RESPIRATION RATE: 18 BRPM | WEIGHT: 194.67 LBS | SYSTOLIC BLOOD PRESSURE: 137 MMHG | BODY MASS INDEX: 35.82 KG/M2 | HEIGHT: 62 IN

## 2023-12-19 VITALS
OXYGEN SATURATION: 92 % | DIASTOLIC BLOOD PRESSURE: 91 MMHG | WEIGHT: 194.8 LBS | TEMPERATURE: 97.6 F | RESPIRATION RATE: 15 BRPM | BODY MASS INDEX: 35.85 KG/M2 | SYSTOLIC BLOOD PRESSURE: 124 MMHG | HEART RATE: 92 BPM | HEIGHT: 62 IN

## 2023-12-19 DIAGNOSIS — M96.89 POSTOPERATIVE SURGICAL COMPLICATION INVOLVING MUSCULOSKELETAL SYSTEM ASSOCIATED WITH MUSCULOSKELETAL PROCEDURE, UNSPECIFIED COMPLICATION: Primary | ICD-10-CM

## 2023-12-19 DIAGNOSIS — M17.0 PRIMARY OSTEOARTHRITIS OF BOTH KNEES: ICD-10-CM

## 2023-12-19 DIAGNOSIS — Z96.652 STATUS POST LEFT PARTIAL KNEE REPLACEMENT: Primary | ICD-10-CM

## 2023-12-19 LAB
B-HCG UR QL: NEGATIVE
QT INTERVAL: 362 MS
QTC INTERVAL: 415 MS

## 2023-12-19 PROCEDURE — 25010000002 CEFAZOLIN PER 500 MG: Performed by: ORTHOPAEDIC SURGERY

## 2023-12-19 PROCEDURE — 25010000002 EPINEPHRINE 1 MG/ML SOLUTION: Performed by: ORTHOPAEDIC SURGERY

## 2023-12-19 PROCEDURE — 25010000002 DEXAMETHASONE PER 1 MG: Performed by: ANESTHESIOLOGIST ASSISTANT

## 2023-12-19 PROCEDURE — 25010000002 KETOROLAC TROMETHAMINE PER 15 MG: Performed by: ORTHOPAEDIC SURGERY

## 2023-12-19 PROCEDURE — 25010000002 ROPIVACAINE PER 1 MG: Performed by: ORTHOPAEDIC SURGERY

## 2023-12-19 PROCEDURE — 81025 URINE PREGNANCY TEST: CPT | Performed by: ORTHOPAEDIC SURGERY

## 2023-12-19 PROCEDURE — 25010000002 CEFAZOLIN PER 500 MG: Performed by: ANESTHESIOLOGIST ASSISTANT

## 2023-12-19 PROCEDURE — C1776 JOINT DEVICE (IMPLANTABLE): HCPCS | Performed by: ORTHOPAEDIC SURGERY

## 2023-12-19 PROCEDURE — 73560 X-RAY EXAM OF KNEE 1 OR 2: CPT

## 2023-12-19 PROCEDURE — 25010000002 CLONIDINE PER 1 MG: Performed by: ORTHOPAEDIC SURGERY

## 2023-12-19 PROCEDURE — 25010000002 MIDAZOLAM PER 1 MG: Performed by: ANESTHESIOLOGY

## 2023-12-19 PROCEDURE — 25010000002 FENTANYL CITRATE (PF) 50 MCG/ML SOLUTION: Performed by: ANESTHESIOLOGIST ASSISTANT

## 2023-12-19 PROCEDURE — 25810000003 LACTATED RINGERS PER 1000 ML: Performed by: ORTHOPAEDIC SURGERY

## 2023-12-19 PROCEDURE — 25010000002 PROPOFOL 200 MG/20ML EMULSION: Performed by: ANESTHESIOLOGIST ASSISTANT

## 2023-12-19 PROCEDURE — 25010000002 ROPIVACAINE PER 1 MG: Performed by: ANESTHESIOLOGY

## 2023-12-19 PROCEDURE — 99282 EMERGENCY DEPT VISIT SF MDM: CPT

## 2023-12-19 PROCEDURE — 25010000002 HYDROMORPHONE 1 MG/ML SOLUTION: Performed by: ANESTHESIOLOGIST ASSISTANT

## 2023-12-19 PROCEDURE — 25010000002 ONDANSETRON PER 1 MG: Performed by: ANESTHESIOLOGIST ASSISTANT

## 2023-12-19 PROCEDURE — C1713 ANCHOR/SCREW BN/BN,TIS/BN: HCPCS | Performed by: ORTHOPAEDIC SURGERY

## 2023-12-19 DEVICE — DEV WND/CLS CONTRL TISS STRATAFIX SYMM PDS PLS CTX 60CM VIL: Type: IMPLANTABLE DEVICE | Site: KNEE | Status: FUNCTIONAL

## 2023-12-19 DEVICE — CAP PART KN VANGUARD M: Type: IMPLANTABLE DEVICE | Site: KNEE | Status: FUNCTIONAL

## 2023-12-19 DEVICE — IMPLANTABLE DEVICE: Type: IMPLANTABLE DEVICE | Site: KNEE | Status: FUNCTIONAL

## 2023-12-19 DEVICE — SEAL HEMO SURG ARISTA/AH ABS/PWDR 3GM: Type: IMPLANTABLE DEVICE | Site: KNEE | Status: FUNCTIONAL

## 2023-12-19 DEVICE — CMT BONE REFOBACIN R W/GENT 1X40: Type: IMPLANTABLE DEVICE | Site: KNEE | Status: FUNCTIONAL

## 2023-12-19 DEVICE — DEV CONTRL TISS STRATAFIX SPIRAL MNCRYL UD 3/0 PLS 30CM: Type: IMPLANTABLE DEVICE | Site: KNEE | Status: FUNCTIONAL

## 2023-12-19 RX ORDER — NALOXONE HCL 0.4 MG/ML
0.4 VIAL (ML) INJECTION AS NEEDED
Status: DISCONTINUED | OUTPATIENT
Start: 2023-12-19 | End: 2023-12-19 | Stop reason: HOSPADM

## 2023-12-19 RX ORDER — ACETAMINOPHEN 325 MG/1
650 TABLET ORAL ONCE AS NEEDED
Status: DISCONTINUED | OUTPATIENT
Start: 2023-12-19 | End: 2023-12-19 | Stop reason: HOSPADM

## 2023-12-19 RX ORDER — AMOXICILLIN 250 MG
2 CAPSULE ORAL 2 TIMES DAILY
Qty: 120 TABLET | Refills: 0 | Status: SHIPPED | OUTPATIENT
Start: 2023-12-19 | End: 2024-01-18

## 2023-12-19 RX ORDER — MEPERIDINE HYDROCHLORIDE 25 MG/ML
12.5 INJECTION INTRAMUSCULAR; INTRAVENOUS; SUBCUTANEOUS
Status: DISCONTINUED | OUTPATIENT
Start: 2023-12-19 | End: 2023-12-19 | Stop reason: HOSPADM

## 2023-12-19 RX ORDER — SODIUM CHLORIDE 0.9 % (FLUSH) 0.9 %
10 SYRINGE (ML) INJECTION AS NEEDED
Status: DISCONTINUED | OUTPATIENT
Start: 2023-12-19 | End: 2023-12-19 | Stop reason: HOSPADM

## 2023-12-19 RX ORDER — ACETAMINOPHEN 500 MG
1000 TABLET ORAL ONCE
Status: COMPLETED | OUTPATIENT
Start: 2023-12-19 | End: 2023-12-19

## 2023-12-19 RX ORDER — FENTANYL CITRATE 50 UG/ML
50 INJECTION, SOLUTION INTRAMUSCULAR; INTRAVENOUS
Status: DISCONTINUED | OUTPATIENT
Start: 2023-12-19 | End: 2023-12-19 | Stop reason: HOSPADM

## 2023-12-19 RX ORDER — PROMETHAZINE HYDROCHLORIDE 25 MG/1
25 SUPPOSITORY RECTAL ONCE AS NEEDED
Status: DISCONTINUED | OUTPATIENT
Start: 2023-12-19 | End: 2023-12-19 | Stop reason: HOSPADM

## 2023-12-19 RX ORDER — ONDANSETRON 4 MG/1
4 TABLET, FILM COATED ORAL EVERY 8 HOURS PRN
Qty: 30 TABLET | Refills: 0 | Status: SHIPPED | OUTPATIENT
Start: 2023-12-19

## 2023-12-19 RX ORDER — PROMETHAZINE HYDROCHLORIDE 25 MG/1
25 TABLET ORAL ONCE AS NEEDED
Status: DISCONTINUED | OUTPATIENT
Start: 2023-12-19 | End: 2023-12-19 | Stop reason: HOSPADM

## 2023-12-19 RX ORDER — SODIUM CHLORIDE, SODIUM LACTATE, POTASSIUM CHLORIDE, CALCIUM CHLORIDE 600; 310; 30; 20 MG/100ML; MG/100ML; MG/100ML; MG/100ML
9 INJECTION, SOLUTION INTRAVENOUS CONTINUOUS PRN
Status: DISCONTINUED | OUTPATIENT
Start: 2023-12-19 | End: 2023-12-19 | Stop reason: HOSPADM

## 2023-12-19 RX ORDER — HYDRALAZINE HYDROCHLORIDE 20 MG/ML
5 INJECTION INTRAMUSCULAR; INTRAVENOUS
Status: DISCONTINUED | OUTPATIENT
Start: 2023-12-19 | End: 2023-12-19 | Stop reason: HOSPADM

## 2023-12-19 RX ORDER — CEFAZOLIN SODIUM 1 G/3ML
INJECTION, POWDER, FOR SOLUTION INTRAMUSCULAR; INTRAVENOUS AS NEEDED
Status: DISCONTINUED | OUTPATIENT
Start: 2023-12-19 | End: 2023-12-19 | Stop reason: SURG

## 2023-12-19 RX ORDER — PROPOFOL 10 MG/ML
INJECTION, EMULSION INTRAVENOUS AS NEEDED
Status: DISCONTINUED | OUTPATIENT
Start: 2023-12-19 | End: 2023-12-19 | Stop reason: SURG

## 2023-12-19 RX ORDER — IPRATROPIUM BROMIDE AND ALBUTEROL SULFATE 2.5; .5 MG/3ML; MG/3ML
3 SOLUTION RESPIRATORY (INHALATION) ONCE AS NEEDED
Status: DISCONTINUED | OUTPATIENT
Start: 2023-12-19 | End: 2023-12-19 | Stop reason: HOSPADM

## 2023-12-19 RX ORDER — LABETALOL HYDROCHLORIDE 5 MG/ML
5 INJECTION, SOLUTION INTRAVENOUS
Status: DISCONTINUED | OUTPATIENT
Start: 2023-12-19 | End: 2023-12-19 | Stop reason: HOSPADM

## 2023-12-19 RX ORDER — DEXAMETHASONE SODIUM PHOSPHATE 4 MG/ML
INJECTION, SOLUTION INTRA-ARTICULAR; INTRALESIONAL; INTRAMUSCULAR; INTRAVENOUS; SOFT TISSUE AS NEEDED
Status: DISCONTINUED | OUTPATIENT
Start: 2023-12-19 | End: 2023-12-19 | Stop reason: SURG

## 2023-12-19 RX ORDER — TRANEXAMIC ACID 100 MG/ML
INJECTION, SOLUTION INTRAVENOUS AS NEEDED
Status: DISCONTINUED | OUTPATIENT
Start: 2023-12-19 | End: 2023-12-19 | Stop reason: SURG

## 2023-12-19 RX ORDER — SODIUM CHLORIDE 0.9 % (FLUSH) 0.9 %
10 SYRINGE (ML) INJECTION EVERY 12 HOURS SCHEDULED
Status: DISCONTINUED | OUTPATIENT
Start: 2023-12-19 | End: 2023-12-19 | Stop reason: HOSPADM

## 2023-12-19 RX ORDER — MIDAZOLAM HYDROCHLORIDE 1 MG/ML
2 INJECTION INTRAMUSCULAR; INTRAVENOUS
Status: DISCONTINUED | OUTPATIENT
Start: 2023-12-19 | End: 2023-12-19 | Stop reason: HOSPADM

## 2023-12-19 RX ORDER — DIPHENHYDRAMINE HYDROCHLORIDE 50 MG/ML
12.5 INJECTION INTRAMUSCULAR; INTRAVENOUS ONCE AS NEEDED
Status: DISCONTINUED | OUTPATIENT
Start: 2023-12-19 | End: 2023-12-19 | Stop reason: HOSPADM

## 2023-12-19 RX ORDER — DIPHENHYDRAMINE HYDROCHLORIDE 50 MG/ML
12.5 INJECTION INTRAMUSCULAR; INTRAVENOUS
Status: DISCONTINUED | OUTPATIENT
Start: 2023-12-19 | End: 2023-12-19 | Stop reason: HOSPADM

## 2023-12-19 RX ORDER — ONDANSETRON 4 MG/1
4 TABLET, FILM COATED ORAL ONCE AS NEEDED
Status: DISCONTINUED | OUTPATIENT
Start: 2023-12-19 | End: 2023-12-19 | Stop reason: HOSPADM

## 2023-12-19 RX ORDER — MIDAZOLAM HYDROCHLORIDE 1 MG/ML
INJECTION INTRAMUSCULAR; INTRAVENOUS AS NEEDED
Status: DISCONTINUED | OUTPATIENT
Start: 2023-12-19 | End: 2023-12-19 | Stop reason: SURG

## 2023-12-19 RX ORDER — CELECOXIB 200 MG/1
200 CAPSULE ORAL ONCE
Status: COMPLETED | OUTPATIENT
Start: 2023-12-19 | End: 2023-12-19

## 2023-12-19 RX ORDER — ROPIVACAINE HYDROCHLORIDE 5 MG/ML
INJECTION, SOLUTION EPIDURAL; INFILTRATION; PERINEURAL
Status: COMPLETED | OUTPATIENT
Start: 2023-12-19 | End: 2023-12-19

## 2023-12-19 RX ORDER — SODIUM CHLORIDE 9 MG/ML
40 INJECTION, SOLUTION INTRAVENOUS AS NEEDED
Status: DISCONTINUED | OUTPATIENT
Start: 2023-12-19 | End: 2023-12-19 | Stop reason: HOSPADM

## 2023-12-19 RX ORDER — DIPHENHYDRAMINE HCL 25 MG
25 CAPSULE ORAL
Status: DISCONTINUED | OUTPATIENT
Start: 2023-12-19 | End: 2023-12-19 | Stop reason: HOSPADM

## 2023-12-19 RX ORDER — LIDOCAINE HYDROCHLORIDE 10 MG/ML
INJECTION, SOLUTION EPIDURAL; INFILTRATION; INTRACAUDAL; PERINEURAL AS NEEDED
Status: DISCONTINUED | OUTPATIENT
Start: 2023-12-19 | End: 2023-12-19 | Stop reason: SURG

## 2023-12-19 RX ORDER — POLYETHYLENE GLYCOL 3350 17 G/17G
17 POWDER, FOR SOLUTION ORAL DAILY
Qty: 238 G | Refills: 0 | Status: SHIPPED | OUTPATIENT
Start: 2023-12-19 | End: 2024-01-02

## 2023-12-19 RX ORDER — ONDANSETRON 2 MG/ML
INJECTION INTRAMUSCULAR; INTRAVENOUS AS NEEDED
Status: DISCONTINUED | OUTPATIENT
Start: 2023-12-19 | End: 2023-12-19 | Stop reason: SURG

## 2023-12-19 RX ORDER — ONDANSETRON 2 MG/ML
4 INJECTION INTRAMUSCULAR; INTRAVENOUS ONCE AS NEEDED
Status: DISCONTINUED | OUTPATIENT
Start: 2023-12-19 | End: 2023-12-19 | Stop reason: HOSPADM

## 2023-12-19 RX ORDER — OXYCODONE HCL 10 MG/1
10 TABLET, FILM COATED, EXTENDED RELEASE ORAL ONCE
Status: COMPLETED | OUTPATIENT
Start: 2023-12-19 | End: 2023-12-19

## 2023-12-19 RX ORDER — LIDOCAINE HYDROCHLORIDE 10 MG/ML
0.5 INJECTION, SOLUTION INFILTRATION; PERINEURAL ONCE AS NEEDED
Status: DISCONTINUED | OUTPATIENT
Start: 2023-12-19 | End: 2023-12-19 | Stop reason: HOSPADM

## 2023-12-19 RX ORDER — OXYCODONE HYDROCHLORIDE 5 MG/1
10 TABLET ORAL EVERY 4 HOURS PRN
Status: DISCONTINUED | OUTPATIENT
Start: 2023-12-19 | End: 2023-12-19 | Stop reason: HOSPADM

## 2023-12-19 RX ORDER — SODIUM CHLORIDE, SODIUM LACTATE, POTASSIUM CHLORIDE, CALCIUM CHLORIDE 600; 310; 30; 20 MG/100ML; MG/100ML; MG/100ML; MG/100ML
1000 INJECTION, SOLUTION INTRAVENOUS CONTINUOUS
Status: DISCONTINUED | OUTPATIENT
Start: 2023-12-19 | End: 2023-12-19 | Stop reason: HOSPADM

## 2023-12-19 RX ORDER — DEXMEDETOMIDINE HYDROCHLORIDE 100 UG/ML
INJECTION, SOLUTION INTRAVENOUS AS NEEDED
Status: DISCONTINUED | OUTPATIENT
Start: 2023-12-19 | End: 2023-12-19 | Stop reason: SURG

## 2023-12-19 RX ORDER — EPHEDRINE SULFATE 5 MG/ML
5 INJECTION INTRAVENOUS ONCE AS NEEDED
Status: DISCONTINUED | OUTPATIENT
Start: 2023-12-19 | End: 2023-12-19 | Stop reason: HOSPADM

## 2023-12-19 RX ORDER — ONDANSETRON 4 MG/1
4 TABLET, FILM COATED ORAL EVERY 8 HOURS PRN
Qty: 20 TABLET | Refills: 0 | Status: SHIPPED | OUTPATIENT
Start: 2023-12-19

## 2023-12-19 RX ORDER — OXYCODONE HYDROCHLORIDE 10 MG/1
10 TABLET ORAL EVERY 4 HOURS PRN
Qty: 45 TABLET | Refills: 0 | Status: SHIPPED | OUTPATIENT
Start: 2023-12-19

## 2023-12-19 RX ADMIN — FENTANYL CITRATE 50 MCG: 50 INJECTION, SOLUTION INTRAMUSCULAR; INTRAVENOUS at 10:40

## 2023-12-19 RX ADMIN — OXYCODONE HYDROCHLORIDE 10 MG: 5 TABLET ORAL at 11:10

## 2023-12-19 RX ADMIN — DEXAMETHASONE SODIUM PHOSPHATE 8 MG: 4 INJECTION, SOLUTION INTRAMUSCULAR; INTRAVENOUS at 09:53

## 2023-12-19 RX ADMIN — PROPOFOL 100 MG: 10 INJECTION, EMULSION INTRAVENOUS at 08:27

## 2023-12-19 RX ADMIN — PROPOFOL 200 MG: 10 INJECTION, EMULSION INTRAVENOUS at 08:04

## 2023-12-19 RX ADMIN — HYDROMORPHONE HYDROCHLORIDE 0.5 MG: 1 INJECTION, SOLUTION INTRAMUSCULAR; INTRAVENOUS; SUBCUTANEOUS at 08:32

## 2023-12-19 RX ADMIN — CELECOXIB 200 MG: 200 CAPSULE ORAL at 06:51

## 2023-12-19 RX ADMIN — FENTANYL CITRATE 50 MCG: 50 INJECTION, SOLUTION INTRAMUSCULAR; INTRAVENOUS at 11:14

## 2023-12-19 RX ADMIN — HYDROMORPHONE HYDROCHLORIDE 0.5 MG: 1 INJECTION, SOLUTION INTRAMUSCULAR; INTRAVENOUS; SUBCUTANEOUS at 10:54

## 2023-12-19 RX ADMIN — ACETAMINOPHEN 1000 MG: 500 TABLET, FILM COATED ORAL at 06:51

## 2023-12-19 RX ADMIN — MIDAZOLAM 2 MG: 1 INJECTION INTRAMUSCULAR; INTRAVENOUS at 07:13

## 2023-12-19 RX ADMIN — ROPIVACAINE HYDROCHLORIDE 30 ML: 5 INJECTION EPIDURAL; INFILTRATION; PERINEURAL at 07:17

## 2023-12-19 RX ADMIN — SODIUM CHLORIDE, POTASSIUM CHLORIDE, SODIUM LACTATE AND CALCIUM CHLORIDE 1000 ML: 600; 310; 30; 20 INJECTION, SOLUTION INTRAVENOUS at 06:52

## 2023-12-19 RX ADMIN — HYDROMORPHONE HYDROCHLORIDE 0.5 MG: 1 INJECTION, SOLUTION INTRAMUSCULAR; INTRAVENOUS; SUBCUTANEOUS at 08:28

## 2023-12-19 RX ADMIN — PROPOFOL 100 MCG/KG/MIN: 10 INJECTION, EMULSION INTRAVENOUS at 08:06

## 2023-12-19 RX ADMIN — MUPIROCIN 1 APPLICATION: 20 OINTMENT TOPICAL at 06:52

## 2023-12-19 RX ADMIN — TRANEXAMIC ACID 1000 MG: 100 INJECTION, SOLUTION INTRAVENOUS at 09:53

## 2023-12-19 RX ADMIN — OXYCODONE HYDROCHLORIDE 10 MG: 10 TABLET, FILM COATED, EXTENDED RELEASE ORAL at 06:51

## 2023-12-19 RX ADMIN — CEFAZOLIN 2 G: 1 INJECTION, POWDER, FOR SOLUTION INTRAMUSCULAR; INTRAVENOUS at 07:59

## 2023-12-19 RX ADMIN — DEXMEDETOMIDINE HYDROCHLORIDE 40 MCG: 100 INJECTION, SOLUTION INTRAVENOUS at 07:17

## 2023-12-19 RX ADMIN — TRANEXAMIC ACID 1000 MG: 100 INJECTION, SOLUTION INTRAVENOUS at 07:59

## 2023-12-19 RX ADMIN — LIDOCAINE HYDROCHLORIDE 50 MG: 10 INJECTION, SOLUTION EPIDURAL; INFILTRATION; INTRACAUDAL; PERINEURAL at 08:04

## 2023-12-19 RX ADMIN — ONDANSETRON 4 MG: 2 INJECTION INTRAMUSCULAR; INTRAVENOUS at 09:53

## 2023-12-19 NOTE — ANESTHESIA PROCEDURE NOTES
Peripheral Block    Pre-sedation assessment completed: 12/19/2023 7:00 AM    Patient reassessed immediately prior to procedure    Patient location during procedure: pre-op  Reason for block: procedure for pain, at surgeon's request, post-op pain management and secondary anesthetic  Performed by  Anesthesiologist: Nikolay Cox MD  Preanesthetic Checklist  Completed: patient identified, IV checked, site marked, risks and benefits discussed, surgical consent, monitors and equipment checked, pre-op evaluation and timeout performed  Prep:  Pt Position: sitting  Sterile barriers:cap, gloves, mask and washed/disinfected hands  Prep: ChloraPrep  Patient monitoring: blood pressure monitoring, continuous pulse oximetry and EKG  Procedure    Sedation: yes  Performed under: local infiltration  Guidance:ultrasound guided and landmark technique    ULTRASOUND INTERPRETATION.  Using ultrasound guidance a 20 G gauge needle was placed in close proximity to the femoral nerve, at which point, under ultrasound guidance anesthetic was injected in the area of the nerve and spread of the anesthesia was seen on ultrasound in close proximity thereto.  There were no abnormalities seen on ultrasound; a digital image was taken; and the patient tolerated the procedure with no complications. Images:still images obtained, printed/placed on chart    Laterality:left  Block Type:adductor canal block  Injection Technique:single-shot  Needle Type:echogenic  Needle Gauge:20 G  Resistance on Injection: less than 15 psi    Medications Used: ropivacaine (NAROPIN) 0.5 % injection - Perineural   30 mL - 12/19/2023 7:17:00 AM      Post Assessment  Injection Assessment: negative aspiration for heme, no paresthesia on injection and incremental injection  Patient Tolerance:comfortable throughout block  Complications:no  Additional Notes  Pre-procedure:  Peripheral nerve block performed preoperatively prior to the start of anesthesia time at the request  of the patient and the surgeon for the management of postoperative acute surgical pain as well as for a secondary intraoperative anesthetic (general anesthesia is the primary intraoperative anesthetic); patient identified; pre-procedure vital signs, all relevant labs/studies, complete medical/surgical/anesthetic history, full medication list, full allergy list, and NPO status obtained/reviewed; physical assessment performed; anesthetic options, side effects, potential complications, risks, and benefits discussed; questions answered; patient wishes to proceed with the procedure; written anesthesia procedure consent obtained; patient cleared for procedure; time out performed; IV access in situ    Procedure:  ASA monitor placed; supplemental oxygen provided; patient positioned; hand hygiene performed; sterile technique maintained throughout the procedure; sterile prep applied; insertion site determined by anatomical landmarks, palpation, and ultrasound imaging; live ultrasound guidance throughout the procedure; target nerves/landmarks identified on live ultrasound; skin and subcutaneous tissues numbed by injection of 1% lidocaine; 4 inch 20G [employer] Ultra 360 Insulated Echogenic Needle used; realtime needle advancement and placement near the target nerves witnessed on live ultrasound; negative aspiration prior to injection; correct needle placement confirmed on live ultrasound by local anesthetic spread around the target nerves; local anesthetic mixture injected with negative aspiration prior to each injection and after each 1-5 mL injected; needle withdrawn; dressing applied; ultrasound image printed and placed in the patient's permanent medical record    Post-procedure:  Peripheral nerve block placed successfully; good block; no apparent complications; minimal estimated blood loss; vital signs stable throughout; see nurse's notes for vitals; transported to the OR, general anesthesia induced, and surgery  started

## 2023-12-19 NOTE — OP NOTE
Partial KNEE ARTHROPLASTY OPERATIVE     PATIENT NAME:Aida Peralta     YOB: 1980     ATTENDING PHYSICIAN: Siddhartha Pretty MD     DATE OF PROCEDURE: 12/19/2023     PREOPERATIVE DIAGNOSIS: Severe degenerative osteoarthritis, left knee, medial compartment.    POSTOPERATIVE DIAGNOSIS: Primary osteoarthritis left knee.    PRINCIPAL DIAGNOSIS: Primary osteoarthritis left knee    PROCEDURE: Left partial medial compartment knee arthroplasty.    Surgical Approach: Knee Mid-Vastus     SURGEON:  Siddhartha Pretty M.D.    ASSISTANT: first Wagner assistant was responsible for performing the following activities: Retraction, Suction, Irrigation, Suturing, Closing, and Placing Dressing and their skilled assistance was necessary for the success of this case.       ANESTHESIOLOGIST: Dr. Nikolay Cox MD    ANESTHESIA: Adductor canal block for postop pain control followed by general anesthesia.    POSITION: Supine on the operating room table.    DRAINS: None.    COMPLICATIONS: None.    ESTIMATED BLOOD LOSS: 100ml    IMPLANT USED: [employer] partial medial compartment knee replacement system, #4 medial femoral condyle posterior cruciate retaining femoral component, number D medial compartment tibia with a 8 mm thick MC, medially constrained Vitamin E impregnated polyethylene insert, anchored into position using Refobicin antibiotic impregnated bone cement.     SPECIMENS:   Order Name Source Comment Collection Info Order Time   PREGNANCY, URINE Urine, Clean Catch  Collected By: Joana Shelby RN 12/19/2023  6:10 AM     Release to patient   Routine Release        HEMOGLOBIN AND HEMATOCRIT, BLOOD    12/19/2023 10:24 AM     Release to patient   Routine Release            Please note: The patient is very young and she had wanted electively to proceed with a partial medial compartment knee replacement.  She had the contralateral knee operated upon in May of this year with the same medial compartment replacement  with an excellent outcome.    INDICATION: The patient has been having very significant pain and discomfort in the knee.  We had scheduled the patient for total knee replacement after all forms of conservative nonoperative care had failed to provide adequate relief of symptoms.  Patient understood all the risks and benefits which were discussed in great detail including the possibility of death, infection, myocardial infarction, DVT, pulmonary embolism, stiffness of the knee, wound infection and breakdown, possibility of revision surgery, neurovascular compromise, pneumonia, pulmonary embolism      DETAILS OF PROCEDURE: Surgical timeout was called. Operative extremity was correctly identified in the operating room suite. Patient was placed under appropriate anesthetic.  Patient was administered IV antibiotics per SCIP protocol and hospital policy. The patient’s operative extremity was correctly identified in the operating room suite.A Tourniquet was applied after the leg has been exsanguinated. The correctly identified extremity was prepped and draped in a standard fashion.      An anterior approach was performed and a quad sparing, subvastus approach was carried out. The patellofemoral ligament was resected. Medial soft tissue release was carried out on the medial face of the tibia to balance the soft tissues and to release the contracture of the knee MCL. Tibial Osteophytes were resected. Severe bone-on-bone appearance was noted.  A thorough synovectomy was carried out. The Synovium was thickened and hypertrophic. The ACL, PCL and MCL were carefully protected throughout the entire procedure. The distal femur was mapped. A 10 mm thick cut was made off the distal femur. A measuring guide was used and #4 posterior cruciate retaining femoral component jig was found to be the best fit. Anterior, posterior and chamfer cuts were created. Care was taken to prevent creating a distal femoral notch. The proximal tibia was  then mapped with navigation. 4 mm of the bone was resected off the medial tibial plateau.  An appropriate tibial slope was built into the cut to match the normal anatomy.  A trial reduction was carried out. Rotation and orientation of the components were carefully marked.  A number D medial compartmental tibial component was found to be the best fit resting nicely on the cortical margins of the resected metaphyseal bone.  Flexion and extension gaps were checked and found to be symmetric. The stability of the components was checked in full extension, mid- flexion and full flexion.The patella was everted, it was found to be intact and the articular cartilage was well-preserved.  A subperiosteal neurectomy was performed around the circumference of the patella to minimize postoperative patellofemoral pain.  Patellar Tracking was excellent. A Lateral release was not deemed necessary. Femoral lug holes were drilled. The Proximal Tibia was die punched. The posterior capsular structures and the subperiosteal ligamentous insertions were infiltrated with Exparel as a local anesthetic.    The cancellous bone was lavaged with a Pulse lavage  and dried.  Cement was mixed on the back table. Components were cemented into position sequentially, starting with the number D tibial component and going to the number for partial medial compartmental femur component . The excess amounts of bone cement were removed from the bone-metal interface. Trial reduction was carried out once the cement was fully cured. A 8 mm tibial polyethylene insert, MC medially constrained design insert, was then locked into position on the tibial tray. Wound was lavaged with antibiotic containing irrigating solution. Tourniquet was deflated, hemostasis achieved. An analgesic cocktail was injected intra-articularly into the joint capsule and ligamentous insertions on bone for post op pain relief. The arthrotomy was repaired in 60 degrees of flexion.  Subcutaneous sutures were applied. Occlusive dressing was applied. No complications were encountered. Sponge count and needle count were correct. The patient was reversed from anesthesia and taken from the operating room to the recovery room in stable condition. I discussed the satisfactory performance of this procedure with the patient’s family and answered all questions for them.       Siddhartha Pretty MD  12/19/2023  10:44 EST

## 2023-12-19 NOTE — ANESTHESIA PROCEDURE NOTES
Airway  Date/Time: 12/19/2023 8:04 AM    Indications and Patient Condition  Indications for airway management: airway protection    Preoxygenated: yes  MILS maintained throughout  Mask difficulty assessment: 0 - not attempted    Final Airway Details  Final airway type: supraglottic airway      Successful airway: classic  Size 3     Assessment: lips, teeth, and gum same as pre-op and atraumatic intubation

## 2023-12-19 NOTE — ANESTHESIA POSTPROCEDURE EVALUATION
Patient: Aida Peralta    Procedure Summary       Date: 12/19/23 Room / Location: [facility] OR 04 / [facility] MAIN OR    Anesthesia Start: 0755 Anesthesia Stop: 1001    Procedure: KNEE ARTHROPLASTY UNICOMPARTMENTAL (Left: Knee) Diagnosis:       Primary osteoarthritis of both knees      (Primary osteoarthritis of both knees [M17.0])    Surgeons: Siddhartha Pretty MD Provider: Nikolay Cox MD    Anesthesia Type: ERAS Protocol, general with block ASA Status: 3            Anesthesia Type: ERAS Protocol, general with block    Vitals  Vitals Value Taken Time   /74 12/19/23 1138   Temp 97.8 °F (36.6 °C) 12/19/23 1138   Pulse 84 12/19/23 1140   Resp 12 12/19/23 1138   SpO2 95 % 12/19/23 1140   Vitals shown include unfiled device data.        Post Anesthesia Care and Evaluation    Patient location during evaluation: PACU  Patient participation: complete - patient participated  Level of consciousness: awake  Pain scale: See nurse's notes for pain score.  Pain management: adequate    Airway patency: patent  Anesthetic complications: No anesthetic complications  PONV Status: none  Cardiovascular status: acceptable  Respiratory status: acceptable and spontaneous ventilation  Hydration status: acceptable    Comments: Patient seen and examined postoperatively; vital signs stable; SpO2 greater than or equal to 90%; cardiopulmonary status stable; nausea/vomiting adequately controlled; pain adequately controlled; no apparent anesthesia complications; patient discharged from anesthesia care when discharge criteria were met

## 2023-12-19 NOTE — DISCHARGE SUMMARY
Orthopedic Discharge Summary      Patient: Aida Peralta      YOB: 1980    Medical Record Number: [id number]    Date of Admission: 12/19/2023  5:48 AM  Date of Discharge:         Primary osteoarthritis of both knees    [unfilled]    Allergies:   Allergies   Allergen Reactions    Lisinopril Itching       Current Medications:     Discharge Medications        New Medications        Instructions Start Date   oxyCODONE 10 MG tablet  Commonly known as: ROXICODONE   10 mg, Oral, Every 4 Hours PRN      sennosides-docusate 8.6-50 MG per tablet  Commonly known as: PERICOLACE   2 tablets, Oral, 2 Times Daily             Changes to Medications        Instructions Start Date   ondansetron 4 MG tablet  Commonly known as: Zofran  What changed: Another medication with the same name was removed. Continue taking this medication, and follow the directions you see here.   4 mg, Oral, Every 8 Hours PRN             Continue These Medications        Instructions Start Date   apixaban 2.5 MG tablet tablet  Commonly known as: ELIQUIS   2.5 mg, Oral, 2 Times Daily      buPROPion  MG 24 hr tablet  Commonly known as: WELLBUTRIN XL   1 tablet, Daily, May take morning of surgery      busPIRone 15 MG tablet  Commonly known as: BUSPAR   7.5 mg, Oral, 3 Times Daily PRN      pantoprazole 40 MG EC tablet  Commonly known as: PROTONIX   1 tablet, Oral, Daily, Take morning of surgery      triamterene-hydrochlorothiazide 37.5-25 MG per capsule  Commonly known as: DYAZIDE   1 capsule, Oral, Daily, Hold morning of surgery             Stop These Medications      acetaminophen-codeine 300-30 MG per tablet  Commonly known as: TYLENOL/CODEINE #3     oxyCODONE-acetaminophen 5-325 MG per tablet  Commonly known as: PERCOCET                  Past Medical History:   Diagnosis Date    CTS (carpal tunnel syndrome) 2012    GERD (gastroesophageal reflux disease)     Hypertension     Migraines     Plica of knee, left 10/22/2021    Primary  osteoarthritis of both knees 10/22/2021    Seizure     one in  2018, no meds    Sleep apnea     no machine        Past Surgical History:   Procedure Laterality Date    CHOLECYSTECTOMY      KNEE ARTHROPLASTY, PARTIAL REPLACEMENT Right 6/6/2023    Procedure: KNEE ARTHROPLASTY, PARTIAL REPLACEMENT;  Surgeon: Siddhartha Pretty MD;  Location: [facility] MAIN OR;  Service: Robotics - Ortho;  Laterality: Right;    TUBAL ABDOMINAL LIGATION          Social History     Occupational History    Not on file   Tobacco Use    Smoking status: Never    Smokeless tobacco: Never   Vaping Use    Vaping Use: Never used   Substance and Sexual Activity    Alcohol use: Not Currently     Comment: occasional    Drug use: Never    Sexual activity: Yes     Partners: Male     Birth control/protection: Tubal ligation      Social History     Social History Narrative    Not on file        Family History   Problem Relation Age of Onset    Cancer Father     Diabetes Father              Hospital Course:  43 y.o. female admitted to [facility] to services of Dr. Pretty on 12/19/2023 and underwent left partial knee arthroplasty.  Antibiotic and VTE prophylaxis were per SCIP protocols. Post-operatively the patient transferred to the post-operative floor where the patient underwent mobilization therapy that included active as well as passive ROM exercises. Opioids were titrated to achieve appropriate pain management to allow for participation in mobilization exercises. Vital signs are now stable. The incision is intact without signs or symptoms of infection. Operative extremity neurovascular status remains intact.   Appropriate education re: incision care, activity levels, medications, and follow up visits was completed and all questions were answered. The patient is now deemed stable for discharge from hospital.        Discharge and Follow up Instructions:   Please see AVS for complete post op patient education   Patient will follow up with   Sukhwinder/Suzi Ray APRN in 1-2 weeks  Oxycodone 10 mg 1 tab Q4 hr PRN e scribed; SHA/INSPECT reviewed  Anticoagulation to continue for 2 weeks  Zofran 4 mg 1 tab PO Q8 hr PRN for N/V  Senna-S, 2 tabs BID e scribed for post op/opioid induced constipation     Date: ................12/19/2023    HARRIET Singer MD

## 2023-12-19 NOTE — DISCHARGE INSTRUCTIONS
Total/Partial Knee Replacement Discharge Instructions:    I. ACTIVITIES:  1. Exercises:  Complete exercise program as taught by the hospital physical therapist 2 times per day  Exercise program will be advanced by the physical therapist  During the day be up ambulating every 2 hours (while awake) for short distances  Complete the ankle pump exercises at least 10 times per hour (while awake)  Elevate legs when in bed and for at least 30 minutes during the day.Use cold packs 20-30 minutes approximately 5 times per day. This should be done before and after completing your exercises and at any time you are experiencing pain/ stiffness in your operative extremity.      2. Activities of Daily Living:  No tub baths, hot tubs, or swimming pools for 4 weeks  May shower with waterproof dressing in place as long as it is intact.  Do not scrub or rub the incision. Let water run over dressing and pat dry.    II. Restrictions  Continue precautions as taught at the hospital  Your surgeon will discuss with you when you will be able to drive again, typically it is when you have enough strength in your leg to step hard on the brake and are off the pain medication.  Weight bearing is as tolerated  First week stay inside on even terrain. May go up and down stairs one stair at a time utilizing the hand rail once cleared by physical therapy to do so.  After one week, you may venture outside (if cleared to do so by physical therapist).    III. Precautions:  Everyone that comes near you should wash their hands  No elective dental, genital-urinary, or colon procedures or surgical procedures for 12 weeks after surgery unless absolutely necessary.   If dental work or surgical procedure is deemed absolutely necessary, you will need to contact your surgeon as you will need to take antibiotics 1 hour prior to any dental work (including teeth cleanings).  Please discuss with your surgeon prophylactic antibiotics as the length of time this  intervention will be necessary for you varies with each patient's health history and situation.  Avoid sick people. If you must be around someone who is ill, they should wear a mask.  Avoid visits to the Emergency Room or Urgent Care unless you are having a life threatening event.  Stockings/ace wraps are to be placed on in the morning and removed at night. Monitor the stockings to ensure that any swelling is not causing the stockings to become too tight. In this case, remove stockings immediately.    IV. INCISION CARE:  Wash your hands often.  Notify office if dressing becomes dislodged or drainage noted. Change the dressing as directed by your physician.  No creams or ointments to the incision  Do not touch or pick at the incision  Check incision every day and notify surgeon immediately if any of the following signs or symptoms are noted:  Increase in redness  Increase in swelling around the incision/dressing and of the entire extremity  Increase in pain  Drainage oozing touching the edges of the dressing  Pulling apart of the edges of the incision  Increase in overall body temperature (greater than 100.5 degrees)  Your surgeon will instruct you regarding suture or staple removal    V. Medications:  1. Anticoagulants: You will be discharged on an anticoagulant. This is a prophylactic medication that helps prevent blood clots during your post-operative period. The type and length of dosage varies based on your individual needs, procedure performed, and surgeon's preference.  While taking the anticoagulant, you should avoid taking any additional aspirin, ibuprofen (Advil or Motrin), Aleve (Naprosyn) or other non-steroidal anti-inflammatory medications, unless prescribed by your surgeon.  Notify surgeon immediately if any mayelin bleeding is noted in the urine, stool, emesis, or from the nose or the incision. Blood in the stool will often appear as black rather than red. Blood in urine may appear as pink. Blood in  emesis may appear as brown/black like coffee grounds.  You will need to apply pressure for longer periods of time to any cuts or abrasions to stop bleeding  Avoid alcohol while taking anticoagulants    2. Stool Softeners: You will be at greater risk of constipation after surgery due to being less mobile and the pain medications.  Take stool softeners as instructed by your surgeon while on pain medications. Over the counter Colace 100 mg 1-2 capsules twice daily.  If stools become too loose or too frequent, please decreases the dosage or stop the stool softener.  If constipation occurs despite use of stool softeners, you are to continue the stool softeners and add a laxative (Milk of Magnesia 1 ounce daily as needed)  Drink plenty of fluids, and eat fruits and vegetables during your recovery time    3. Pain Medications utilized after surgery are narcotics and the law requires that the following information be given to all patients that are prescribed narcotics:  CLASSIFICATION: Pain medications are called Opioids and are narcotics  LEGALITIES: It is illegal to share narcotics with others and to drive within 24 hours of taking narcotics  POTENTIAL SIDE EFFECTS: Potential side effects of opioids include: nausea, vomiting, itching, dizziness, drowsiness, dry mouth, constipation, and difficulty urinating.  POTENTIAL ADVERSE EFFECTS:  Opioid tolerance can develop with use of pain medications and this simply means that it requires more and more of the medication to control pain; however, this is seen more in patients that use opioids for longer periods of time.  Opioid dependence can develop with use of Opioids and this simply means that to stop the medication can cause withdrawal symptoms; however, this is seen with patients that use Opioids for longer periods of time.  Opioid addiction can develop with use of Opioids and the incidence of this is very unlikely in patients who take the medications as ordered and stop the  medications as instructed.  Opioid overdose can be dangerous, but is unlikely when the medication is taken as ordered and stopped when ordered. It is important not to mix opioids with alcohol or with and type of sedative such as Benadryl as this can lead to over sedation and respiratory difficulty.  DOSAGE:  Pain medications will need to be taken consistently for the first week to decrease pain and promote adequate pain relief and participation in physical therapy.  After the initial surgical pain begins to resolve, you may begin to decrease the pain medication. By the end of 6 weeks, you should be off of pain medications.  Refills will not be given by the office during evening hours, on weekends, or after 6 weeks post-op.  To seek refills on pain medications during the initial 6 week post-operative period, you must call the office 48 hours in advance to request the refill. The office will then notify you when to  the prescription. DO NOT wait until you are out of the medication to request a refill.    V. FOLLOW-UP VISITS:  You will need to follow up in the office with Dr. Siddhartha Pretty/Suzi Ray in 1-2 weeks. Please call this number [phone] to schedule this appointment.  You will need to follow up with your primary care physician in 4 weeks.  If you have any concerns or suspected complications prior to your follow up visit, please call your surgeons office. Do not wait until your appointment time if you suspect complications. These will need to be addressed in the office promptly.    You have a BRISEIDA wound dressing in place.  This was placed under sterile conditions in the operating room.  It remains in place until your follow-up appointment.  The drain will operate with slight suction for 7 days.  After 7 days, it will stop working automatically.  At that time, remove the batteries from the battery pack and then discard the battery pack in the trash.  You may cover the end of the tubing with  "plastic wrap and tape it to the dressing or cut the tubing off near the dressing and cover it with tape, assuring that the area continues to be waterproof.  Keep it this way until your follow-up appointment.  Showering is ok after surgery.  Pause the battery pack (push the large orange button) and unscrew the battery pack from the tubing.  Place plastic wrap or tape over the end of the tubing.  Shower, attempting to keep the dressing out of the stream of water.  When finished, gently pat the dressing and tubing dry, remove plastic wrap or tape from end of tubing, and reattach tubing to the battery pack.  Then press the large orange button.  You will feel the battery pack vibrate until it achieves suction.  Then it will flash green over the \"ok\" button and run as usual.    "

## 2023-12-19 NOTE — H&P
History & Physical       Patient: Aida Peralta    Date of Admission: 12/19/2023  5:48 AM    YOB: 1980    Medical Record Number: [id number]    Attending Physician: Siddhartha Pretty MD        Chief Complaints: Primary osteoarthritis of both knees [M17.0]      History of Present Illness: 43 y.o. female presents with Primary osteoarthritis of both knees [M17.0]. Onset of symptoms was gradual and slowly progressive with pain on the medial aspect of the left knee.  Symptoms are associated with pain and discomfort on the medial aspect of the knee.  Symptoms are aggravated by flexion and extension of the knee.   Symptoms improve with anti-inflammatory medication and use of her brace. Patient is now being admitted to the services of Siddhartha Pretty MD for further evaluation and treatment.        Allergies   Allergen Reactions    Lisinopril Itching         Home Medications:  Medications Prior to Admission   Medication Sig Dispense Refill Last Dose    buPROPion XL (WELLBUTRIN XL) 300 MG 24 hr tablet 1 tablet Daily. May take morning of surgery  Indications: Anxiety disorder   12/18/2023    pantoprazole (PROTONIX) 40 MG EC tablet Take 1 tablet by mouth Daily. Take morning of surgery  Indications: Gastroesophageal Reflux Disease   12/19/2023    triamterene-hydrochlorothiazide (DYAZIDE) 37.5-25 MG per capsule Take 1 capsule by mouth Daily. Hold morning of surgery  Indications: High Blood Pressure Disorder   12/18/2023    acetaminophen-codeine (TYLENOL/CODEINE #3) 300-30 MG per tablet Take 1-2 tablets by mouth Every 6 (Six) Hours As Needed for Moderate Pain. 20 tablet 0 More than a month    apixaban (ELIQUIS) 2.5 MG tablet tablet Take 1 tablet by mouth 2 (Two) Times a Day. 20 tablet 0 More than a month    busPIRone (BUSPAR) 15 MG tablet Take 0.5 tablets by mouth 3 (Three) Times a Day As Needed (anxiety). Indications: Anxiety Disorder   More than a month    ondansetron (Zofran) 4 MG tablet Take 1 tablet by mouth Every 6  (Six) Hours As Needed for Nausea or Vomiting. 30 tablet 0 More than a month    ondansetron (Zofran) 4 MG tablet Take 1 tablet by mouth Every 8 (Eight) Hours As Needed for Nausea or Vomiting. 1 tablet 0     oxyCODONE-acetaminophen (PERCOCET) 5-325 MG per tablet Take 1 tablet by mouth At Night As Needed for Severe Pain. (Patient not taking: Reported on 11/29/2023) 20 tablet 0 More than a month       Current Medications:  Scheduled Meds:ceFAZolin, 2,000 mg, Intravenous, Once  sodium chloride, 10 mL, Intravenous, Q12H  sodium chloride, 10 mL, Intravenous, Q12H      Continuous Infusions:lactated ringers, 1,000 mL, Last Rate: 1,000 mL (12/19/23 0652)  lactated ringers, 9 mL/hr      PRN Meds:.  lactated ringers    lidocaine    sodium chloride    sodium chloride    sodium chloride    sodium chloride       Past Medical History:   Diagnosis Date    CTS (carpal tunnel syndrome) 2012    GERD (gastroesophageal reflux disease)     Hypertension     Migraines     Plica of knee, left 10/22/2021    Primary osteoarthritis of both knees 10/22/2021    Seizure     one in  2018, no meds    Sleep apnea     no machine        Past Surgical History:   Procedure Laterality Date    CHOLECYSTECTOMY      KNEE ARTHROPLASTY, PARTIAL REPLACEMENT Right 6/6/2023    Procedure: KNEE ARTHROPLASTY, PARTIAL REPLACEMENT;  Surgeon: Siddhartha Pretty MD;  Location: [facility];  Service: Robotics - Ortho;  Laterality: Right;    TUBAL ABDOMINAL LIGATION          Social History     Occupational History    Not on file   Tobacco Use    Smoking status: Never    Smokeless tobacco: Never   Vaping Use    Vaping Use: Never used   Substance and Sexual Activity    Alcohol use: Not Currently     Comment: occasional    Drug use: Never    Sexual activity: Yes     Partners: Male     Birth control/protection: Tubal ligation      Social History     Social History Narrative    Not on file        Family History   Problem Relation Age of Onset    Cancer Father     Diabetes  Father          Review of Systems:   HEENT: Patient denies any headaches, vision changes, change in hearing, or tinnitus. Patient denies any rhinorrhea,epistaxis, sinus pain, mouth or dental problems, sore throat or hoarseness, or dysphagia  Pulmonary: Patient denies any cough, congestion, SOA, or wheezing  Cardiovascular: Patient denies any chest pain, dyspnea, palpitations, weakness, intolerance of exercise, varicosities, swelling of extremities, known murmur  Gastrointestinal:  Patient denies nausea, vomiting, diarrhea, constipation, loss  of appetite, change in appetite, dysphagia, gas, heartburn, melena, change in bowel habits, use of laxatives or other drugs to alter the function of the gastrointestinal tract.  Genital/Urinary: Patient denies dysuria, change in color of urine, change in frequency of urination, pain with urgency, incontinence, retention, or nocturia.  Musculoskeletal: Patient denies increased warmth; redness; or swelling of joints; limitation of function; deformity; crepitation: pain in a joint or an extremity, the neck, or the back, especially with movement.  Neurological: Patient denies dizziness, tremor, ataxia, difficulty in speaking, change in speech, paresthesia, loss of sensation, seizures, syncope, changes in memory.  Endocrine system: Patient denies tremors, palpitations, intolerance of heat or cold, polyuria, polydipsia, polyphagia, diaphoresis, exophthalmos, or goiter.  Psychological: Patient denies thoughts/plans of harming self or other; depression,  insomnia, night terrors, narendra, memory loss, disorientation.  Skin: Patient denies any bruising, rashes, discoloration, pruritus, wounds, ulcers, decubiti, changes in the hair or nails  Hematopoietic: Patient denies history of spontaneous or excessive bleeding, epistaxis, hematuria, melena, fatigue, enlarged or tender lymph nodes, pallor, history of anemia.    Physical Exam: 43 y.o. female  Vitals:    12/19/23 0715 12/19/23 0720  12/19/23 0725 12/19/23 0731   BP: 129/96 133/86 136/80 137/78   Pulse: 82 77 79 83   Resp:  18 13 15   Temp:       SpO2: 99% 98% 98% 99%   Weight:       Height:           General Appearance:          Alert, cooperative, in no acute distress                                                 Head:    Normocephalic, without obvious abnormality, atraumatic   Eyes:            Lids and lashes normal, conjunctivae and sclerae normal, no   icterus, no pallor, corneas clear, PERRLA   Ears:    Ears appear intact with no abnormalities noted   Throat:   No oral lesions, no thrush, oral mucosa moist   Neck:   No adenopathy, supple, trachea midline, no thyromegaly, no   carotid bruit, no JVD   Back:     No kyphosis present, no scoliosis present, no skin lesions,      erythema or scars, no tenderness to percussion or                   palpation,   range of motion normal   Lungs:     Clear to auscultation,respirations regular, even and                  unlabored    Heart:    Regular rhythm and normal rate, normal S1 and S2, no            murmur, no gallop, no rub, no click   Chest Wall:    No abnormalities observed   Abdomen:     Normal bowel sounds, no masses, no organomegaly, soft        nontender, nondistended, no guarding, no rebound                tenderness   Rectal:     Deferred   Extremities:   Tenderness over anterior aspect of the left knee. Moves all extremities well, no edema,   no cyanosis, no redness   Pulses:   Pulses palpable and equal bilaterally   Skin:   No bleeding, bruising or rash   Lymph nodes:   No palpable adenopathy   Neurologic:   Cranial nerves 2 - 12 grossly intact, sensation intact, DTR       present and equal bilaterally           Diagnostic Tests:  Admission on 12/19/2023   Component Date Value Ref Range Status    HCG, Urine QL 12/19/2023 Negative  Negative Final     No results found.      Assessment:    Primary osteoarthritis of both knees        Plan:  The patient voiced understanding of the risks,  benefits, and alternative forms of treatment that were discussed and the patient consents to proceed with left knee partial knee replacement.   The patient was seen today for preoperative discussion.  The patient has been tried on over-the-counter and prescription NSAID's despite the risks of anti-inflammatory bleeding, peptic ulcers and erosive gastritis with short term benefit only.  Braces have been prescribed for mechanical support.  Patient has been participating in an exercise program specifically targeting joint pain relief with limited benefit. Intraarticular injections have been used periodically with some but not complete relief of pain.  Ambulation aids have also been utilized.      The details of the surgical procedure were explained including the location of probable incisions and a description of the likely hardware/grafts to be used. The patient understands the likely convalescence after surgery as well as the rehabilitation required.  Also, we have thoroughly discussed with the patient the risks, benefits and alternatives to surgery.  Risks include but are not limited to the risk of infection, joint stiffness, limited range of motion, wound healing problems, scar tissue build up, myocardial infarction, stroke, blood clots (including DVT and/or pulmonary embolus along with the risk of death) neurologic and/or vascular injury, limb length discrepancy, fracture, dislocation, nonunion, malunion, continued pain and need for further surgery including hardware failure requiring revision.      Discharge Plan: today to home and home health      Date: 12/19/2023    Siddhartha Pretty MD      DICTATED UTILIZING DRAGON DICTATION

## 2023-12-20 NOTE — ED PROVIDER NOTES
Subjective   History of Present Illness  43-year-old female presents emergency room with concern for postop bleeding.  Patient had a partial left knee replacement done today at [facility] by Dr. Pretty.  Patient states that upon arriving home she took a nap and when she awoke she noticed that her dressing had some dried blood at the base of the dressing.  Patient reports no increased pain or change in her range of motion.  Patient reports no edema or pain or decrease sensation distal to her knee.  Patient states she tried to call the on-call numbers for her orthopedist but no one answered.  Patient presents emergency room for concern of the dried blood.      Review of Systems   Constitutional:  Negative for activity change, appetite change, chills, diaphoresis, fatigue and fever.   HENT:  Negative for congestion, rhinorrhea and sore throat.    Eyes:  Negative for photophobia and visual disturbance.   Respiratory:  Negative for cough and shortness of breath.    Cardiovascular:  Negative for chest pain, palpitations and leg swelling.   Gastrointestinal:  Negative for abdominal distention, abdominal pain, diarrhea, nausea and vomiting.   Genitourinary:  Negative for dysuria and flank pain.   Musculoskeletal:  Negative for arthralgias and back pain.   Skin:  Positive for wound. Negative for rash.   Neurological:  Negative for dizziness, weakness and headaches.   Psychiatric/Behavioral:  Negative for agitation and behavioral problems.        Past Medical History:   Diagnosis Date    CTS (carpal tunnel syndrome) 2012    GERD (gastroesophageal reflux disease)     Hypertension     Migraines     Plica of knee, left 10/22/2021    Primary osteoarthritis of both knees 10/22/2021    Seizure     one in  2018, no meds    Sleep apnea     no machine       Allergies   Allergen Reactions    Lisinopril Itching       Past Surgical History:   Procedure Laterality Date    CHOLECYSTECTOMY      KNEE ARTHROPLASTY, PARTIAL REPLACEMENT Right  6/6/2023    Procedure: KNEE ARTHROPLASTY, PARTIAL REPLACEMENT;  Surgeon: Siddhartha Pretty MD;  Location: [facility] MAIN OR;  Service: [employer] - Ortho;  Laterality: Right;    TUBAL ABDOMINAL LIGATION         Family History   Problem Relation Age of Onset    Cancer Father     Diabetes Father        Social History     Socioeconomic History    Marital status: Single   Tobacco Use    Smoking status: Never    Smokeless tobacco: Never   Vaping Use    Vaping Use: Never used   Substance and Sexual Activity    Alcohol use: Not Currently     Comment: occasional    Drug use: Never    Sexual activity: Yes     Partners: Male     Birth control/protection: Tubal ligation           Objective   Physical Exam  Vitals and nursing note reviewed.   Constitutional:       General: She is not in acute distress.     Appearance: Normal appearance. She is not ill-appearing, toxic-appearing or diaphoretic.      Comments: Pleasant 43-year-old female in no apparent distress   HENT:      Head: Normocephalic and atraumatic.      Nose: Nose normal.      Mouth/Throat:      Mouth: Mucous membranes are moist.   Eyes:      Conjunctiva/sclera: Conjunctivae normal.   Cardiovascular:      Rate and Rhythm: Normal rate.      Pulses: Normal pulses.   Pulmonary:      Effort: Pulmonary effort is normal.   Abdominal:      General: Abdomen is flat. There is no distension.      Tenderness: There is no abdominal tenderness.   Musculoskeletal:         General: No swelling. Normal range of motion.      Cervical back: Normal range of motion.      Right knee: Normal.      Comments: Patient's left knee has surgical dressing with wound VAC attachment.  There is approximately a 2 x 3 cm area of dried blood on the surgical dressing.  There is no active bleeding that can be seen in dressing appears intact and appropriate.  Patient is neurovascular intact distally and there is no edema noted.   Skin:     General: Skin is warm and dry.   Neurological:      General: No focal  deficit present.      Mental Status: She is alert.   Psychiatric:         Mood and Affect: Mood normal.         Procedures           ED Course  ED Course as of 12/19/23 2244   Tue Dec 19, 2023   2241 Reassured patient that dried blood is an appropriate amount status post surgery today.  Discussed with patient things to look for for worsening bleeding or signs of infection and can follow-up with orthopedic surgeon as scheduled and/or can return the emergency room for new persistent worsening symptoms.  Patient states she understands agrees with plan [BH]      ED Course User Index  [] Pelon Montalvo MD                                             Medical Decision Making  My diagnosis for lower extremity pain and injury includes but is not limited to hip fracture, femur fracture, hip dislocation, hip contusion, hip sprain, hip strain, pelvic fracture, ischio-tibial band pain, ischio-tibial band bursitis, knee sprain, patella dislocation, knee dislocation, internal derangement of knee, fractures of the femur, tibia, fibula, ankle, foot and digits, ankle sprain, ankle dislocation, Lisfranc fracture, fracture dislocations of the digits, pulmonary embolism, claudication, peripheral vascular disease, gout, osteoarthritis, rheumatoid arthritis, bursitis, septic joint, poly-rheumatica, polyarthralgia and other inflammatory or infectious disease processes.         Final diagnoses:   Postoperative surgical complication involving musculoskeletal system associated with musculoskeletal procedure, unspecified complication       ED Disposition  ED Disposition       ED Disposition   Discharge    Condition   Stable    Comment   --               Siddhartha Pretty MD  [address] [place], [place] [id number]  [phone]    Schedule an appointment as soon as possible for a visit       [facility] EMERGENCY DEPARTMENT  [address] [facility] [address] [phone]  Go to   As needed         Medication  List      No changes were made to your prescriptions during this visit.            Pelon Montalvo MD  12/19/23 9217

## 2023-12-22 ENCOUNTER — HOSPITAL ENCOUNTER (OUTPATIENT)
Dept: PHYSICAL THERAPY | Facility: HOSPITAL | Age: 43
Setting detail: THERAPIES SERIES
Discharge: HOME | End: 2023-12-22
Payer: COMMERCIAL

## 2023-12-22 DIAGNOSIS — Z96.652 STATUS POST LEFT PARTIAL KNEE REPLACEMENT: Primary | ICD-10-CM

## 2023-12-22 PROCEDURE — 97110 THERAPEUTIC EXERCISES: CPT

## 2023-12-22 PROCEDURE — 97161 PT EVAL LOW COMPLEX 20 MIN: CPT

## 2023-12-22 NOTE — THERAPY EVALUATION
Outpatient Physical Therapy Ortho Initial Evaluation   Jazz Bullock     Patient Name: Aida Peralta  : 1980  MRN: [id number]  Today's Date: 2023      Visit Date: 2023    Patient Active Problem List   Diagnosis    YULIA (generalized anxiety disorder)    History of seizure    Hypertension    Hypothyroidism    Primary osteoarthritis of both knees    Morbid obesity    Plica of knee, left    Adjustment disorder    Status post right partial knee replacement        Past Medical History:   Diagnosis Date    CTS (carpal tunnel syndrome)     GERD (gastroesophageal reflux disease)     Hypertension     Migraines     Plica of knee, left 10/22/2021    Primary osteoarthritis of both knees 10/22/2021    Seizure     one in  2018, no meds    Sleep apnea     no machine        Past Surgical History:   Procedure Laterality Date    CHOLECYSTECTOMY      KNEE ARTHROPLASTY, PARTIAL REPLACEMENT Right 2023    Procedure: KNEE ARTHROPLASTY, PARTIAL REPLACEMENT;  Surgeon: Siddhartha Pretty MD;  Location: [facility];  Service: Robotics - Ortho;  Laterality: Right;    TOTAL KNEE ARTHROPLASTY Left 2023    Procedure: KNEE ARTHROPLASTY UNICOMPARTMENTAL;  Surgeon: Siddhartha Pretty MD;  Location: [facility];  Service: Orthopedics;  Laterality: Left;    TUBAL ABDOMINAL LIGATION         Visit Dx:     ICD-10-CM ICD-9-CM   1. Status post left partial knee replacement  Z96.652 V43.65          Patient History       Row Name 23 1400             History    Chief Complaint Difficulty Walking;Difficulty with daily activities;Joint stiffness;Joint swelling;Muscle tenderness;Muscle weakness;Pain;Tightness  -LN      Type of Pain Knee pain;Lower Extremity / Leg  left  -LN      Date Current Problem(s) Began 23  -LN      Brief Description of Current Complaint Pt s/p left unicompartmental  (medial) partial left knee replacement on 23; surgery was done as outpatient and pt now referred for outpatient PT.  Pt had  "cortisone shots and gel injections without much benefit prior to surgery.  No previous injury or surgery done on that knee.  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  -LN      Patient seeing anyone else for problem(s)? Orthopedic surgeon  -LN      How has patient tried to help current problem? cortisone injections,gel injections; rest; ice; using FWW; elevation  -LN      What clinical tests have you had for this problem? X-ray  -LN      Additional Clinical Tests after surgery= Status post medial compartment knee arthroplasty in near-anatomic alignment, with no evidence of immediate complication.  -LN      Related/Recent Hospitalizations No  -LN      Surgery/Hospitalization s/p L medial compartmental partial TKA  -LN      History of Previous Related Injuries None reported  -LN         Pain     Pain Location Knee  Left  -LN      Pain at Present 4  -LN      Pain at Best 4  -LN      Pain at Worst 10  -LN      Pain Frequency Constant/continuous  -LN      Pain Description Aching;Tightness  -LN      What Performance Factors Make the Current Problem(s) WORSE? bending knee; sitting; lying down; walking  -LN      What Performance Factors Make the Current Problem(s) BETTER? standing seems to better per pt; ice; pain meds; using FWW  -LN      Tolerance Time- Standing limited  -LN      Tolerance Time- Sitting limited  -LN      Tolerance Time- Walking limited  -LN      Tolerance Time- Lying limited  -LN      Is your sleep disturbed? Yes  " -LN      What position do you sleep in? Supine  -LN      Difficulties at work? off work presently  -LN      Difficulties with ADL's? has needed help with L shoe and sock- had to wear a slipper today on L.  -LN      Difficulties with recreational activities? yes  -LN         Fall Risk Assessment    Any falls in the past year: No  -LN         Services    Prior Rehab/Home Health Experiences Yes  -LN      When was the prior experience with Rehab/Home Health s/p R partial knee replacment 6/19/23-10/4/23.  -LN      Where was the prior experience with Rehab/Home Health   -LN      Are you currently receiving Home Health services No  -LN      Do you plan to receive Home Health services in the near future No  -LN         Daily Activities    Primary Language English  -LN      Are you able to read Yes  -LN      Are you able to write Yes  -LN      How does patient learn best? Listening;Demonstration  -LN      Teaching needs identified Home Exercise Program;Other (comment)  Risks and benefits of treatment explained to pt.  -LN      Patient is concerned about/has problems with Bed Mobility;Climbing Stairs;Difficulty with self care (i.e. bathing, dressing, toileting:;Flexibility;Performing home management (household chores, shopping, care of dependents);Performing job responsibilities/community activities (work, school,;Performing sports, recreation, and play activities;Sitting;Standing;Transfers (getting out of a chair, bed);Walking  -LN      Does patient have problems with the following? None  -LN      Barriers to learning None  -LN      Pt Participated in POC and Goals Yes  -LN         Safety    Are you being hurt, hit, or frightened by anyone at home or in your life? No  -LN      Are you being neglected by a caregiver No  -LN      Have you had any of the following issues with N/A  -LN                User Key  (r) = Recorded By, (t) = Taken By, (c) = Cosigned By      Initials Name Provider Type    ZOE  "Gloria Kan, PT Physical Therapist                     PT Ortho       Row Name 12/22/23 1500       Subjective    Subjective Comments \"I feel like this knee is already doing a lot better than the other knee did.\" \"When the block wore off the pain was really bad but better now.\" \"It is pretty swollen.\" \"The back of my knee seems to hurt the most.\"  -LN       Subjective Pain    Able to rate subjective pain? yes  -LN    Pre-Treatment Pain Level 4  -LN       Posture/Observations    Observations Ecchymosis/bruising;Edema;Erythema;Incision healing;Muscle atrophy  -LN    Posture/Observations Comments Pt with pressure dressing intact L knee; minimal erythmea and bruising noted, jett medial knee. Moderate edema noted. Pt tends to keep left leg very guarded. Area of dried blood noted distal bandage.  -LN       Knee Palpation    Patella Left:;Tender;Swollen  -LN    Medial Joint Line Left:;Tender;Swollen  -LN    Lateral Joint Line Left:;Tender;Swollen  -LN    Posterior Joint Line Left:;Tender;Swollen  -LN    ITB Left:;Tender;Guarded/taut  -LN    Quads Left:;Tender;Guarded/taut;Swollen  -LN    Medial Gastroc Head Left:;Tender;Guarded/taut  -LN    Lateral Gastroc Head Left:;Tender;Guarded/taut  -LN       Patellar Accessory Motions    Superior glide Left:;Hypomobile  -LN    Inferior glide Left:;Hypomobile  -LN    Medial glide Left:;Hypomobile  -LN    Lateral glide Left:;Hypomobile  -LN       Knee Special Tests    Knee Special Tests Comments No special tests done due to recent L TKA.  -LN       Left Lower Ext    Lt Knee Extension/Flexion AROM 20-61 degrees seated; 5 degrees supine knee extension; 1 degree extension post-stretch.  -LN    Lt Knee Extension/Flexion PROM AA 58 degrees flexion supine; 65 degrees passive knee flexion seated.  -LN       MMT (Manual Muscle Testing)    General MMT Comments No MMT done today due to recent L TKA  -LN       Sensation    Light Touch Partial deficits in the LLE  -LN       Lower " Extremity Flexibility    Hamstrings Left:;Moderately limited  -LN    Hip Flexors Left:;Moderately limited  -LN    Quadriceps Left:;Moderately limited  -LN    ITB Left:;Moderately limited  -LN    Gastrocnemius Left:;Mildly limited;Moderately limited  -LN    Soleus Left:;Mildly limited;Moderately limited  -LN       RLE Quick Girth (cm)    Smallest ankle 21 cm  -LN    Largest calf 39 cm  -LN    Tib tuberosity 38.2 cm  -LN    Mid patella 41.6 cm  -LN    Distal thigh 51 cm  -LN    RLE Quick Girth Total 190.8  -LN       LLE Quick Girth (cm)    Smallest ankle 23.4 cm  -LN    Largest calf 42 cm  -LN    Tib tuberosity 40.6 cm  -LN    Mid patella 47.1 cm  -LN    Distal thigh 56.7 cm  -LN       Transfers    Sit-Stand [place] (Transfers) modified independence  -LN    Stand-Sit [place] (Transfers) modified independence  -LN    Transfers, Sit-Stand-Sit, Assist Device rolling walker  -LN       Gait/Stairs (Locomotion)    [place] Level (Gait) modified independence  -LN    Assistive Device (Gait) walker, front-wheeled  -LN    Deviations/Abnormal Patterns (Gait) left sided deviations;antalgic;rl decreased;gait speed decreased;stride length decreased;weight shifting decreased  -LN    Left Sided Gait Deviations decreased knee extension;heel strike decreased  decreased knee flexion noted; tends to keep L knee stiff  -LN    Comment, (Gait/Stairs) Pt ambulates with FWW with slow gait speed  with decreased stride length and decreased heel to toe gait on L. Moderate antalgic gait on L. Pt tends to keep L leg stiff with ambulation.  -LN              User Key  (r) = Recorded By, (t) = Taken By, (c) = Cosigned By      Initials Name Provider Type    Gloria Wolf, PT Physical Therapist                                Therapy Education  Education Details: Pt to work on HEP 1-2 x day as tolerated, but encouraged to work on knee ROM at least 2 times a day. Pt to use ice and elevation for edema control. Pt to work on  bending left knee more with ambulation and improved heel to toe gait.  Given: HEP, Symptoms/condition management, Pain management, Edema management, Mobility training, Posture/body mechanics  Program: New, Reinforced  How Provided: Verbal, Demonstration, Written  Provided to: Patient  Level of Understanding: Teach back education performed, Verbalized, Demonstrated      PT OP Goals       Row Name 12/22/23 1500          PT Short Term Goals    STG Date to Achieve 01/05/24  -LN     STG 1 Pt to verbally report decreased left knee/leg pain to <4/10 with ADLs and everyday activities.  -LN     STG 2 Pt independent with initial HEP issued by therapist.  -LN     STG 3 Left knee ROM improved to 0-90 degrees to allow her to get in and out of car easier.  -LN     STG 4 Left hip and knee strength improved by 1/2 muscle grade.  -LN     STG 5 Edema left knee decreased by 1-2 cm.  -LN     STG 6 Pt able to ambulate home & short community distances with SPC with only minimal antalgic gait and improved L heel to toe gait.  -LN     STG 7 Pt able to perform 20 SLR independently and only 5-10 degree Quad lag.  -LN        Long Term Goals    LTG Date to Achieve 01/19/24  -LN     LTG 1 Pt to verbally report decreased left  knee/leg pain to <2/10 with ADLs and everyday activities.  -LN     LTG 2 Pt independent with more advanced HEP issued by therapist.  -LN     LTG 3 Left knee ROM improved to 0-115 degrees.  -LN     LTG 4 Left hip and knee strength improved to 4+-5/5.  -LN     LTG 5 Edema left knee decreased to nominal.  -LN     LTG 6 Pt able to ambulate home & short community distances without any AD with only nominal limp and good heel to toe gait.  -LN     LTG 7 No Quad lag noted with leg lifts.  -LN        Time Calculation    PT Goal Re-Cert Due Date 01/19/24  -LN               User Key  (r) = Recorded By, (t) = Taken By, (c) = Cosigned By      Initials Name Provider Type    Gloria Wolf, PT Physical Therapist                      PT Assessment/Plan       Row Name 12/22/23 1500          PT Assessment    Functional Limitations Impaired gait;Impaired locomotion;Limitation in home management;Limitations in community activities;Limitations in functional capacity and performance;Performance in leisure activities;Performance in self-care ADL;Performance in work activities  -LN     Impairments Edema;Gait;Impaired flexibility;Joint mobility;Locomotion;Muscle strength;Pain;Range of motion;Sensation  -LN     Assessment Comments Pt presents 3 days s/p L medial compartment partial TKA. Pt presents with c/o constant L knee and leg pain with tenderness jett medial knee area and moderate edema noted. Pt with decreased L knee ROM, decreased left knee and hip strength, decreased flexibility L LE; and gait deficit. Pt tends to keep left leg very guarded and tight.  Pt with decreased tolerance to home/community/work and leisure activities due to above.  -LN     Please refer to paper survey for additional self-reported information Yes  -LN     Rehab Potential Good  -LN     Patient/caregiver participated in establishment of treatment plan and goals Yes  -LN     Patient would benefit from skilled therapy intervention Yes  -LN        PT Plan    PT Frequency 2x/week  -LN     Predicted Duration of Therapy Intervention (PT) 6-8 weeks  -LN     Planned CPT's? PT EVAL LOW COMPLEXITY: 94276;PT RE-EVAL: 33051;PT THER PROC EA 15 MIN: 57340;PT MANUAL THERAPY EA 15 MIN: 48605;PT GAIT TRAINING EA 15 MIN: 30143;PT HOT OR COLD PACK TREAT MCARE;PT ELECTRICAL STIM UNATTEND:   -LN     Physical Therapy Interventions (Optional Details) gait training;home exercise program;manual therapy techniques;modalities;patient/family education;ROM (Range of Motion);strengthening;stretching;taping;stair training  -LN     PT Plan Comments See pt 2 x week for therapeutic exercises/TKA exercises with HEP; CP PRN; pt education; manual therapy; gait/stairs training; kinesiotape PRN.  -LN   "             User Key  (r) = Recorded By, (t) = Taken By, (c) = Cosigned By      Initials Name Provider Type    Gloria Wolf, PT Physical Therapist                     Modalities       Row Name 12/22/23 1500             Ice    Ice Applied Yes  -LN      Location anterior and posterior L knee with patient supine.  -LN      PT Ice Rx Minutes 10  -LN      Ice S/P Rx Yes  -LN                User Key  (r) = Recorded By, (t) = Taken By, (c) = Cosigned By      Initials Name Provider Type    LN Gloria Kan, PT Physical Therapist                   OP Exercises       Row Name 12/22/23 1500             Precautions    Existing Precautions/Restrictions no known precautions/restrictions  -LN         Subjective    Subjective Comments \"I feel like this knee is already doing a lot better than the other knee did.\" \"When the block wore off the pain was really bad but better now.\" \"It is pretty swollen.\" \"The back of my knee seems to hurt the most.\"  -LN         Subjective Pain    Able to rate subjective pain? yes  -LN      Pre-Treatment Pain Level 4  -LN         Total Minutes    22426 - PT Therapeutic Exercise Minutes 20  -LN         Exercise 1    Exercise Name 1 Heelslides with sheet  -LN      Cueing 1 Verbal;Tactile;Demo  -LN      Time 1 5 min  -LN         Exercise 2    Exercise Name 2 Wallslides  -LN         Exercise 3    Exercise Name 3 Bike  -LN         Exercise 5    Exercise Name 5 QS over single towel roll  -LN      Cueing 5 Verbal;Tactile;Demo  -LN      Reps 5 10  -LN      Time 5 5 sec  -LN         Exercise 6    Exercise Name 6 SAQ  -LN      Cueing 6 Verbal;Tactile;Demo  -LN      Reps 6 10  -LN      Additional Comments pt not doing at home yet- was very painful today  -LN         Exercise 7    Exercise Name 7 supine hip abduction/adduction  -LN      Cueing 7 Verbal;Tactile;Demo  -LN      Reps 7 10  -LN         Exercise 8    Exercise Name 8 SLR  -LN      Cueing 8 Verbal;Tactile;Demo  -LN      Sets 8 2  " -LN      Reps 8 5  -LN      Time 8 2 sec  -LN      Additional Comments with therapist assist  -LN         Exercise 9    Exercise Name 9 supine GS  -LN      Cueing 9 Verbal;Tactile;Demo  -LN      Reps 9 10  -LN      Time 9 5 sec  -LN         Exercise 10    Exercise Name 10 Heel prop  -LN      Cueing 10 Verbal;Tactile;Demo  -LN      Time 10 2 minutes  -LN                User Key  (r) = Recorded By, (t) = Taken By, (c) = Cosigned By      Initials Name Provider Type    Gloria Wolf, [place] Physical Therapist                  Manual Rx (last 36 hours)       Manual Treatments       Row Name 12/22/23 1500             Manual Rx 2    Manual Rx 2 Location Seated passive left knee flexion  -LN      Manual Rx 2 Duration 10 x 10 sec  -LN                User Key  (r) = Recorded By, (t) = Taken By, (c) = Cosigned By      Initials Name Provider Type    Gloria Wolf, [place] Physical Therapist                                Outcome Measure Options: Lower Extremity Functional Scale (LEFS)  Lower Extremity Functional Index  Any of your usual work, housework or school activities: Quite a bit of difficulty  Your usual hobbies, recreational or sporting activities: Extreme difficulty or unable to perform activity  Getting into or out of the bath: Extreme difficulty or unable to perform activity  Walking between rooms: Quite a bit of difficulty  Putting on your shoes or socks: Quite a bit of difficulty  Squatting: Extreme difficulty or unable to perform activity  Lifting an object, like a bag of groceries from the floor: Quite a bit of difficulty  Performing light activities around your home: Quite a bit of difficulty  Performing heavy activities around your home: Extreme difficulty or unable to perform activity  Getting into or out of a car: Quite a bit of difficulty  Walking 2 blocks: Extreme difficulty or unable to perform activity  Walking a mile: Extreme difficulty or unable to perform activity  Going up or down 10  stairs (about 1 flight of stairs): Extreme difficulty or unable to perform activity  Standing for 1 hour: Extreme difficulty or unable to perform activity  Sitting for 1 hour: Quite a bit of difficulty  Running on even ground: Extreme difficulty or unable to perform activity  Running on uneven ground: Extreme difficulty or unable to perform activity  Making sharp turns while running fast: Extreme difficulty or unable to perform activity  Hopping: Extreme difficulty or unable to perform activity  Rolling over in bed: Extreme difficulty or unable to perform activity  Total: 7  Lower Extremity Functional Index  Any of your usual work, housework or school activities: Quite a bit of difficulty  Your usual hobbies, recreational or sporting activities: Extreme difficulty or unable to perform activity  Getting into or out of the bath: Extreme difficulty or unable to perform activity  Walking between rooms: Quite a bit of difficulty  Putting on your shoes or socks: Quite a bit of difficulty  Squatting: Extreme difficulty or unable to perform activity  Lifting an object, like a bag of groceries from the floor: Quite a bit of difficulty  Performing light activities around your home: Quite a bit of difficulty  Performing heavy activities around your home: Extreme difficulty or unable to perform activity  Getting into or out of a car: Quite a bit of difficulty  Walking 2 blocks: Extreme difficulty or unable to perform activity  Walking a mile: Extreme difficulty or unable to perform activity  Going up or down 10 stairs (about 1 flight of stairs): Extreme difficulty or unable to perform activity  Standing for 1 hour: Extreme difficulty or unable to perform activity  Sitting for 1 hour: Quite a bit of difficulty  Running on even ground: Extreme difficulty or unable to perform activity  Running on uneven ground: Extreme difficulty or unable to perform activity  Making sharp turns while running fast: Extreme difficulty or unable to  perform activity  Hopping: Extreme difficulty or unable to perform activity  Rolling over in bed: Extreme difficulty or unable to perform activity  Total: 7      Time Calculation:     Start Time: 1500  Stop Time: 1600  Time Calculation (min): 60 min  Timed Charges  51834 - PT Therapeutic Exercise Minutes: 20  Untimed Charges  PT Ice Rx Minutes: 10  Total Minutes  Timed Charges Total Minutes: 20  Untimed Charges Total Minutes: 10   Total Minutes: 30     Therapy Charges for Today       Code Description Service Date Service Provider Modifiers Qty    33501451426 HC PT THER PROC EA 15 MIN 12/22/2023 Gloria Kan, PT GP 1    [id number]  PT EVAL LOW COMPLEXITY 3 12/22/2023 Gloria Kan, PT GP 1            PT G-Codes  Outcome Measure Options: Lower Extremity Functional Scale (LEFS)  Total: 7         Gloria Kan, PT  12/22/2023

## 2023-12-26 ENCOUNTER — TELEPHONE (OUTPATIENT)
Dept: ORTHOPEDIC SURGERY | Facility: CLINIC | Age: 43
End: 2023-12-26
Payer: COMMERCIAL

## 2023-12-26 NOTE — TELEPHONE ENCOUNTER
DELETE AFTER REVIEWING: Telephone encounter to be sent to the     Caller: Aida Peralta    Relationship to patient: Self    Best call back number: [phone]    Chief complaint: LEFT KNEE     Type of visit: POST OP     Requested date: SOONER THAN 12/28/2023     If rescheduling, when is the original appointment: 12/28/2023     Additional notes:PATIENT STATES THE BANDAGE HAS PUSS AND BLOOD AND WOULD LIKE TO BE SEEN SOONER

## 2023-12-26 NOTE — TELEPHONE ENCOUNTER
Patient came into office today for bandage check.  She went to ER and they had marked on bandage with a black magic marker and blood has not exceeded over the black line.  Her BRISEIDA line is still running.  The patient was given a new 6 inch ace bandage and advised to keep appointment 12/28/2023.  She expressed an understanding.

## 2023-12-26 NOTE — TELEPHONE ENCOUNTER
Patient is coming in today to have Porsha change bandage and will follow up Thursday with Suzi LARIOS

## 2023-12-27 ENCOUNTER — HOSPITAL ENCOUNTER (OUTPATIENT)
Dept: PHYSICAL THERAPY | Facility: HOSPITAL | Age: 43
Setting detail: THERAPIES SERIES
Discharge: HOME | End: 2023-12-27
Payer: COMMERCIAL

## 2023-12-27 DIAGNOSIS — Z96.652 STATUS POST LEFT PARTIAL KNEE REPLACEMENT: Primary | ICD-10-CM

## 2023-12-27 PROCEDURE — 97140 MANUAL THERAPY 1/> REGIONS: CPT

## 2023-12-27 PROCEDURE — 97110 THERAPEUTIC EXERCISES: CPT

## 2023-12-27 NOTE — THERAPY TREATMENT NOTE
"  Outpatient Physical Therapy Ortho Treatment Note   Jazz Bullock     Patient Name: Aida Peralta  : 1980  MRN: [id number]  Today's Date: 2023      Visit Date: 2023    Visit Dx:    ICD-10-CM ICD-9-CM   1. Status post left partial knee replacement  Z96.652 V43.65       Patient Active Problem List   Diagnosis    YULIA (generalized anxiety disorder)    History of seizure    Hypertension    Hypothyroidism    Primary osteoarthritis of both knees    Morbid obesity    Plica of knee, left    Adjustment disorder    Status post right partial knee replacement        Past Medical History:   Diagnosis Date    CTS (carpal tunnel syndrome)     GERD (gastroesophageal reflux disease)     Hypertension     Migraines     Plica of knee, left 10/22/2021    Primary osteoarthritis of both knees 10/22/2021    Seizure     one in  , no meds    Sleep apnea     no machine        Past Surgical History:   Procedure Laterality Date    CHOLECYSTECTOMY      KNEE ARTHROPLASTY, PARTIAL REPLACEMENT Right 2023    Procedure: KNEE ARTHROPLASTY, PARTIAL REPLACEMENT;  Surgeon: Siddhartha Pretty MD;  Location: [facility] MAIN OR;  Service: Robotics - Ortho;  Laterality: Right;    TOTAL KNEE ARTHROPLASTY Left 2023    Procedure: KNEE ARTHROPLASTY UNICOMPARTMENTAL;  Surgeon: Siddhartha Pretty MD;  Location: [facility] MAIN OR;  Service: Orthopedics;  Laterality: Left;    TUBAL ABDOMINAL LIGATION          PT Ortho       Row Name 23 1300       Left Lower Ext    Lt Knee Extension/Flexion AROM 0 degrees supine extension after stretching  -LN    Lt Knee Extension/Flexion PROM seated passive knee flexion= 72 degrees  -LN      Row Name 23 1200       Subjective    Subjective Comments \"My knee is hurting today and I had to take a Benadryl so I am sleepy.\" \"I am doing my exercises but I admit that I am not trying too hard because it hurts.\"  -LN       Subjective Pain    Able to rate subjective pain? yes  -LN    Pre-Treatment Pain Level 6  " "-LN              User Key  (r) = Recorded By, (t) = Taken By, (c) = Cosigned By      Initials Name Provider Type    Gloria Wolf, PT Physical Therapist                                 PT Assessment/Plan       Row Name 12/27/23 1300          PT Assessment    Assessment Comments Pt tolerated exercises fairly well but still having a lot of knee pain, which affects her tolerance to exercises and ROM. Pt still ambulates with slow gait speed and moderate antalgic gait on R with FWW. She tends to keep L leg very stiff and guarded. Improved L knee ROM today.  -LN        PT Plan    PT Frequency 2x/week  -LN     PT Plan Comments Continue per POC. Progress exercises as tolerated. Add standing HS curls and Russian stim with QS as tolerated. Modalities PRN. Continue as MD orders; pt sees MD tomorrow.  -LN               User Key  (r) = Recorded By, (t) = Taken By, (c) = Cosigned By      Initials Name Provider Type    Gloria Wolf, PT Physical Therapist                     Modalities       Row Name 12/27/23 1300             Ice    Ice Applied Yes  -LN      Location anterior and posterior L knee with patient supine.  -LN      PT Ice Rx Minutes 10  -LN      Ice S/P Rx Yes  -LN      Patient reports will apply ice at home to involved area Yes  -LN                User Key  (r) = Recorded By, (t) = Taken By, (c) = Cosigned By      Initials Name Provider Type    Gloria Wolf, PT Physical Therapist                   OP Exercises       Row Name 12/27/23 1300 12/27/23 1200          Precautions    Existing Precautions/Restrictions -- no known precautions/restrictions  -LN        Subjective    Subjective Comments -- \"My knee is hurting today and I had to take a Benadryl so I am sleepy.\" \"I am doing my exercises but I am not trying too hard because it hurts.\"  -LN        Subjective Pain    Able to rate subjective pain? -- yes  -LN     Pre-Treatment Pain Level -- 6  -LN        Total Minutes    91344 - " PT Therapeutic Exercise Minutes 35  -LN --     62958 - PT Manual Therapy Minutes 10  -LN --        Exercise 1    Exercise Name 1 Heelslides with sheet  -LN --     Cueing 1 Verbal;Tactile;Demo  -LN --     Time 1 5 minutes  -LN --        Exercise 2    Exercise Name 2 Wallslides  -LN --     Cueing 2 Verbal;Tactile;Demo  -LN --     Time 2 5 minutes  -LN --        Exercise 3    Exercise Name 3 Bike  -LN --        Exercise 5    Exercise Name 5 QS over single towel roll  -LN --     Cueing 5 Verbal;Tactile;Demo  -LN --     Reps 5 10  -LN --     Time 5 5 sec  -LN --        Exercise 6    Exercise Name 6 SAQ  -LN --     Cueing 6 --  -LN --     Reps 6 --  -LN --        Exercise 7    Exercise Name 7 supine hip abduction/adduction  -LN --     Cueing 7 Verbal;Tactile;Demo  -LN --     Reps 7 10  -LN --        Exercise 8    Exercise Name 8 SLR  -LN --     Cueing 8 Verbal;Tactile;Demo  -LN --     Sets 8 2  -LN --     Reps 8 5  -LN --     Time 8 2 sec  -LN --     Additional Comments needed assist to initiate  -LN --        Exercise 9    Exercise Name 9 supine GS  -LN --     Cueing 9 Verbal;Tactile;Demo  -LN --     Reps 9 10  -LN --     Time 9 5 sec  -LN --        Exercise 10    Exercise Name 10 Heel prop  -LN --     Cueing 10 Verbal;Tactile;Demo  -LN --     Time 10 3 minutes  -LN --        Exercise 11    Exercise Name 11 seated march  -LN --     Cueing 11 Verbal;Tactile;Demo  -LN --     Reps 11 10  -LN --     Additional Comments in chair  -LN --        Exercise 12    Exercise Name 12 LAQ  -LN --     Cueing 12 Verbal;Tactile;Demo  -LN --     Reps 12 10  -LN --        Exercise 13    Exercise Name 13 seated ball squeeze  -LN --     Cueing 13 Verbal;Tactile;Demo  -LN --     Reps 13 10  -LN --     Time 13 5 sec  -LN --     Additional Comments in chair  -LN --        Exercise 14    Exercise Name 14 standing calf raises  -LN --     Cueing 14 Verbal;Tactile;Demo  -LN --     Reps 14 10  -LN --     Additional Comments at elevated table  -LN --                User Key  (r) = Recorded By, (t) = Taken By, (c) = Cosigned By      Initials Name Provider Type    Gloria Wolf, PT Physical Therapist                             Manual Rx (last 36 hours)       Manual Treatments       Row Name 12/27/23 1300             Total Minutes    24183 - PT Manual Therapy Minutes 10  -LN         Manual Rx 2    Manual Rx 2 Location Seated passive left knee flexion  -LN      Manual Rx 2 Duration 10 x 10 sec  -LN         Manual Rx 3    Manual Rx 3 Location Posterior tibial mobs  -LN      Manual Rx 3 Duration throughout PROM Flexion  -LN                User Key  (r) = Recorded By, (t) = Taken By, (c) = Cosigned By      Initials Name Provider Type    Gloria Wolf, PT Physical Therapist                        Therapy Education  Education Details: Pt to add seated march, LAQ, seated ball squeezes, and standing calf raises to HEP as tolerated. Pt encouraged to continue with HEP 1-2 x day and to work on knee ROM/flexion at least 2 times a day. Pt to continue with ice and elevation for edema control. Pt to also continue to work on bending left knee more with ambulation as well as improved heel to toe gait.  Given: HEP, Symptoms/condition management, Pain management, Edema management, Posture/body mechanics, Mobility training  Program: New, Reinforced (added seated march, LAQ, seated ball squeezes, and standing calf raises.)  How Provided: Verbal, Demonstration, Written  Provided to: Patient  Level of Understanding: Teach back education performed, Verbalized, Demonstrated              Time Calculation:   Start Time: 1300  Stop Time: 1355  Time Calculation (min): 55 min  Timed Charges  97118 - PT Therapeutic Exercise Minutes: 35  01072 - PT Manual Therapy Minutes: 10  Untimed Charges  PT Ice Rx Minutes: 10  Total Minutes  Timed Charges Total Minutes: 45  Untimed Charges Total Minutes: 10   Total Minutes: 55  Therapy Charges for Today       Code Description Service  Date Service Provider Modifiers Qty    [id number]  PT THER PROC EA 15 MIN 12/27/2023 Gloria Kan, PT GP 2    56265786040 HC PT MANUAL THERAPY EA 15 MIN 12/27/2023 Gloria Kan, PT GP 1                      Gloria Kan, PT  12/27/2023

## 2023-12-28 ENCOUNTER — TELEPHONE (OUTPATIENT)
Dept: ORTHOPEDIC SURGERY | Facility: CLINIC | Age: 43
End: 2023-12-28
Payer: COMMERCIAL

## 2023-12-28 DIAGNOSIS — Z96.652 STATUS POST LEFT PARTIAL KNEE REPLACEMENT: ICD-10-CM

## 2023-12-28 DIAGNOSIS — M17.0 BILATERAL PRIMARY OSTEOARTHRITIS OF KNEE: Primary | ICD-10-CM

## 2023-12-28 RX ORDER — OXYCODONE AND ACETAMINOPHEN 7.5; 325 MG/1; MG/1
1 TABLET ORAL EVERY 4 HOURS PRN
Qty: 30 TABLET | Refills: 0 | Status: SHIPPED | OUTPATIENT
Start: 2023-12-28

## 2023-12-28 RX ORDER — OXYCODONE HYDROCHLORIDE 10 MG/1
10 TABLET ORAL EVERY 4 HOURS PRN
Qty: 45 TABLET | Refills: 0 | Status: CANCELLED | OUTPATIENT
Start: 2023-12-28

## 2023-12-28 NOTE — TELEPHONE ENCOUNTER
I called the pt and let her know that we never denied her medication and her pharmacy should be calling her soon.

## 2023-12-28 NOTE — TELEPHONE ENCOUNTER
Caller: Aida Peralta    Relationship: Self    Best call back number: [phone] (home)     Requested Prescriptions:   Requested Prescriptions     Pending Prescriptions Disp Refills    oxyCODONE (ROXICODONE) 10 MG tablet 45 tablet 0     Sig: Take 1 tablet by mouth Every 4 (Four) Hours As Needed for Severe Pain.        Pharmacy where request should be sent:      Last office visit with prescribing clinician: 11/29/2023   Last telemedicine visit with prescribing clinician: Visit date not found   Next office visit with prescribing clinician: 1/2/2024     Additional details provided by patient: PATIENT HAS 5 PILLS LEFT, WAS SUPPOSED TO BE SEEN TODAY, BUT OFFICE NEEDED APPT MOVED. ASKING FOR REFILL FOR PAIN.     Does the patient have less than a 3 day supply:  [x] Yes  [] No\    Mane Underwood Rep   12/28/23 08:23 EST

## 2023-12-28 NOTE — TELEPHONE ENCOUNTER
Caller: Aida Peralta    Relationship to patient: Self    Best call back number: [phone]    Patient is needing: PATIENT WAS CALLING TO FIND OUT WHY HER PRESCRIPTION FOR OXYCODONE 10 MG WAS DENIED. PATIENT WOULD LIKE A CALL BACK TO DISCUSS THIS MATTER. THANK YOU!

## 2023-12-29 ENCOUNTER — HOSPITAL ENCOUNTER (OUTPATIENT)
Dept: PHYSICAL THERAPY | Facility: HOSPITAL | Age: 43
Setting detail: THERAPIES SERIES
Discharge: HOME | End: 2023-12-29
Payer: COMMERCIAL

## 2023-12-29 DIAGNOSIS — Z96.652 STATUS POST LEFT PARTIAL KNEE REPLACEMENT: Primary | ICD-10-CM

## 2023-12-29 PROCEDURE — 97110 THERAPEUTIC EXERCISES: CPT

## 2023-12-29 NOTE — THERAPY TREATMENT NOTE
"  Outpatient Physical Therapy Ortho Treatment Note   Jazz Bullock     Patient Name: Aida Peralta  : 1980  MRN: [id number]  Today's Date: 2023      Visit Date: 2023    Visit Dx:    ICD-10-CM ICD-9-CM   1. Status post left partial knee replacement  Z96.652 V43.65       Patient Active Problem List   Diagnosis    YULIA (generalized anxiety disorder)    History of seizure    Hypertension    Hypothyroidism    Primary osteoarthritis of both knees    Morbid obesity    Plica of knee, left    Adjustment disorder    Status post right partial knee replacement        Past Medical History:   Diagnosis Date    CTS (carpal tunnel syndrome)     GERD (gastroesophageal reflux disease)     Hypertension     Migraines     Plica of knee, left 10/22/2021    Primary osteoarthritis of both knees 10/22/2021    Seizure     one in  , no meds    Sleep apnea     no machine        Past Surgical History:   Procedure Laterality Date    CHOLECYSTECTOMY      KNEE ARTHROPLASTY, PARTIAL REPLACEMENT Right 2023    Procedure: KNEE ARTHROPLASTY, PARTIAL REPLACEMENT;  Surgeon: Siddhartha Pretty MD;  Location: [facility] OR;  Service: Robotics - Ortho;  Laterality: Right;    TOTAL KNEE ARTHROPLASTY Left 2023    Procedure: KNEE ARTHROPLASTY UNICOMPARTMENTAL;  Surgeon: Siddhartha Pretty MD;  Location: [facility] MAIN OR;  Service: Orthopedics;  Laterality: Left;    TUBAL ABDOMINAL LIGATION          PT Ortho       Row Name 23 1200       Subjective    Subjective Comments \"My pain is about a 4 when I am up and moving around; it feels like something is stabbing me in my shin.\" \"They canceled my MD appointment yesterday and now I don't go until on Tuesday and I am so ready for this bandage to be removed because it is gross.\"  -LN       Subjective Pain    Able to rate subjective pain? yes  -LN    Pre-Treatment Pain Level 4  -LN       Left Lower Ext    Lt Knee Extension/Flexion AROM 0 degrees supine extension after stretching  -LN    " "Lt Knee Extension/Flexion PROM seated passive knee flexion= 78 degrees  -LN              User Key  (r) = Recorded By, (t) = Taken By, (c) = Cosigned By      Initials Name Provider Type    Gloria Wolf, PT Physical Therapist                                 PT Assessment/Plan       Row Name 12/29/23 1300          PT Assessment    Assessment Comments Pt tolerated exercises fairly well but still has decreased tolerance to passive stretching and tends to tense up a lot; but doing less leaning back. She is progressing well with exercises overall. She still ambulates with FWW with slow gait speed and decreased knee flexion noted. Improved left knee flexion measured today and good knee extension.  -LN        PT Plan    PT Frequency 2x/week  -LN     PT Plan Comments Continue per POC. Progress exercises as tolerated. Add standing HS curls and Russian stim with QS as tolerated. Modalities PRN. Continue as MD orders; pt sees MD on 1/2/23. Add bike as tolerated as well.  -LN               User Key  (r) = Recorded By, (t) = Taken By, (c) = Cosigned By      Initials Name Provider Type    Gloria Wolf, PT Physical Therapist                     Modalities       Row Name 12/29/23 1200             Ice    Ice Applied Yes  -LN      Location anterior and posterior L knee with patient supine.  -LN      PT Ice Rx Minutes 10  -LN      Ice S/P Rx Yes  -LN      Patient reports will apply ice at home to involved area Yes  -LN                User Key  (r) = Recorded By, (t) = Taken By, (c) = Cosigned By      Initials Name Provider Type    Gloria Wolf, PT Physical Therapist                   OP Exercises       Row Name 12/29/23 1200 12/29/23 1100          Precautions    Existing Precautions/Restrictions no known precautions/restrictions  -LN --        Subjective    Subjective Comments \"My pain is about a 4 when I am up and moving around; it feels like something is stabbing me in my shin.\" \"They canceled " "my MD appointment yesterday and now I don't go until on Tuesday and I am so ready for this bandage to be removed because it is gross.\"  -LN --        Subjective Pain    Able to rate subjective pain? yes  -LN --     Pre-Treatment Pain Level 4  -LN --        Total Minutes    50808 - PT Therapeutic Exercise Minutes 40  -LN --     52769 - PT Manual Therapy Minutes -- 10  -LN        Exercise 1    Exercise Name 1 Heelslides with sheet  -LN --     Cueing 1 Verbal;Tactile;Demo  -LN --     Time 1 6 minutes  -LN --        Exercise 2    Exercise Name 2 Wallslides  -LN --     Cueing 2 Verbal;Tactile;Demo  -LN --     Time 2 6 minutes  -LN --        Exercise 3    Exercise Name 3 Bike  -LN --        Exercise 4    Exercise Name 4 supine HS stretch with sheet  -LN --     Cueing 4 Verbal;Tactile;Demo  -LN --     Reps 4 10  -LN --     Time 4 10 sec  -LN --        Exercise 5    Exercise Name 5 QS over single towel roll  -LN --     Cueing 5 Verbal;Tactile;Demo  -LN --     Reps 5 15  -LN --     Time 5 5 sec  -LN --        Exercise 6    Exercise Name 6 SAQ  -LN --        Exercise 7    Exercise Name 7 S/L hip abduction/adduction  -LN --     Cueing 7 Verbal;Tactile;Demo  -LN --     Reps 7 15  -LN --        Exercise 8    Exercise Name 8 SLR  -LN --     Cueing 8 Verbal;Tactile;Demo  -LN --     Sets 8 --  -LN --     Reps 8 10  -LN --     Time 8 2 sec  -LN --     Additional Comments needed assist to initiate with first 2 reps  -LN --        Exercise 9    Exercise Name 9 supine GS  -LN --     Cueing 9 Verbal;Tactile;Demo  -LN --     Reps 9 15  -LN --     Time 9 5 sec  -LN --        Exercise 10    Exercise Name 10 Heel prop  -LN --     Cueing 10 Verbal;Tactile;Demo  -LN --     Time 10 4 minutes  -LN --        Exercise 11    Exercise Name 11 seated march  -LN --     Cueing 11 Verbal;Tactile;Demo  -LN --     Reps 11 15  -LN --     Additional Comments in chair  -LN --        Exercise 12    Exercise Name 12 LAQ  -LN --     Cueing 12 " Verbal;Tactile;Demo  -LN --     Reps 12 10  -LN --        Exercise 13    Exercise Name 13 seated ball squeeze  -LN --     Cueing 13 Verbal;Tactile;Demo  -LN --     Reps 13 15  -LN --     Time 13 5 sec  -LN --        Exercise 14    Exercise Name 14 standing calf raises  -LN --     Cueing 14 Verbal;Tactile;Demo  -LN --     Reps 14 15  -LN --     Additional Comments at elevated table  -LN --               User Key  (r) = Recorded By, (t) = Taken By, (c) = Cosigned By      Initials Name Provider Type    LN Gloria Kan, PT Physical Therapist                             Manual Rx (last 36 hours)       Manual Treatments       Row Name 12/29/23 1100             Total Minutes    40819 - PT Manual Therapy Minutes 10  -LN         Manual Rx 2    Manual Rx 2 Location Seated passive left knee flexion  -LN      Manual Rx 2 Duration 10 x 10 sec  -LN         Manual Rx 3    Manual Rx 3 Location Posterior tibial mobs  -LN      Manual Rx 3 Duration throughout PROM Flexion  -LN                User Key  (r) = Recorded By, (t) = Taken By, (c) = Cosigned By      Initials Name Provider Type    LN Gloria Kan, PT Physical Therapist                        Therapy Education  Education Details: Pt to add supine HS stretch with sheet to HEP and change hip abduction to S/L as tolerated. Pt to use CP after exercises and as needed at home.  Given: HEP, Symptoms/condition management, Pain management, Posture/body mechanics, Mobility training, Edema management  Program: New, Reinforced, Progressed (added supine HS stretch with sheet and progressed  hip abduction to S/L vs supine.)  How Provided: Verbal, Demonstration (pt declined needing any written instructions today.)  Provided to: Patient  Level of Understanding: Teach back education performed, Verbalized, Demonstrated              Time Calculation:   Start Time: 1233  Stop Time: 1340  Time Calculation (min): 67 min  Timed Charges  14654 - PT Therapeutic Exercise Minutes:  40  71382 - PT Manual Therapy Minutes: 10  Untimed Charges  PT Ice Rx Minutes: 10  Total Minutes  Timed Charges Total Minutes: 40  Untimed Charges Total Minutes: 10   Total Minutes: 50  Therapy Charges for Today       Code Description Service Date Service Provider Modifiers Qty    80753368259 HC PT THER PROC EA 15 MIN 12/29/2023 Gloria Kan, PT GP 3                      Gloria Kan, PT  12/29/2023

## 2024-01-02 ENCOUNTER — OFFICE VISIT (OUTPATIENT)
Dept: ORTHOPEDIC SURGERY | Facility: CLINIC | Age: 44
End: 2024-01-02
Payer: COMMERCIAL

## 2024-01-02 VITALS — HEIGHT: 61 IN | BODY MASS INDEX: 36.63 KG/M2 | RESPIRATION RATE: 20 BRPM | OXYGEN SATURATION: 99 % | WEIGHT: 194 LBS

## 2024-01-02 DIAGNOSIS — Z96.652 STATUS POST LEFT PARTIAL KNEE REPLACEMENT: Primary | ICD-10-CM

## 2024-01-02 PROCEDURE — 99024 POSTOP FOLLOW-UP VISIT: CPT | Performed by: NURSE PRACTITIONER

## 2024-01-02 NOTE — PROGRESS NOTES
[facility]  Post Operative Visit      Patient Name: Aida Peralta  : 1980  Primary Care Physician: Aida Sheldon APRN        Chief Complaint:  S/P left knee partial arthroplasty with Dr. Pretty on 23.    HPI:   Aida Peralta is a 43 y.o. who presents today for postoperative evaluation.     Doing well overall. Pain controlled. Taking blood thinner as prescribed. Working with PT - flexion near 80 today. No fevers, chest pain, cough. No constipation.       Past Medical/Surgical, Social and Family History:  I have reviewed and/or updated pertinent history as noted in the medical record including:  Past Medical History:   Diagnosis Date    CTS (carpal tunnel syndrome)     GERD (gastroesophageal reflux disease)     Hypertension     Migraines     Plica of knee, left 10/22/2021    Primary osteoarthritis of both knees 10/22/2021    Seizure     one in  2018, no meds    Sleep apnea     no machine     Past Surgical History:   Procedure Laterality Date    CHOLECYSTECTOMY      KNEE ARTHROPLASTY, PARTIAL REPLACEMENT Right 2023    Procedure: KNEE ARTHROPLASTY, PARTIAL REPLACEMENT;  Surgeon: Siddhartha Pretty MD;  Location: [facility];  Service: Robotics - Ortho;  Laterality: Right;    TOTAL KNEE ARTHROPLASTY Left 2023    Procedure: KNEE ARTHROPLASTY UNICOMPARTMENTAL;  Surgeon: Siddhartha Pretty MD;  Location: [facility];  Service: Orthopedics;  Laterality: Left;    TUBAL ABDOMINAL LIGATION       Social History     Occupational History    Not on file   Tobacco Use    Smoking status: Never    Smokeless tobacco: Never   Vaping Use    Vaping Use: Never used   Substance and Sexual Activity    Alcohol use: Not Currently     Comment: occasional    Drug use: Never    Sexual activity: Yes     Partners: Male     Birth control/protection: Tubal ligation      Social History     Social History Narrative    Not on file     Family History   Problem Relation Age of Onset    Cancer Father     Diabetes Father         Allergies:   Allergies   Allergen Reactions    Lisinopril Itching       Medications:   Home Medications:  Current Outpatient Medications on File Prior to Visit   Medication Sig    apixaban (ELIQUIS) 2.5 MG tablet tablet Take 1 tablet by mouth 2 (Two) Times a Day for 14 days.    apixaban (ELIQUIS) 2.5 MG tablet tablet Take 1 tablet by mouth 2 (Two) Times a Day.    buPROPion XL (WELLBUTRIN XL) 300 MG 24 hr tablet 1 tablet Daily. May take morning of surgery  Indications: Anxiety disorder    busPIRone (BUSPAR) 15 MG tablet Take 0.5 tablets by mouth 3 (Three) Times a Day As Needed (anxiety). Indications: Anxiety Disorder    ondansetron (Zofran) 4 MG tablet Take 1 tablet by mouth Every 8 (Eight) Hours As Needed for Nausea or Vomiting.    ondansetron (Zofran) 4 MG tablet Take 1 tablet by mouth Every 8 (Eight) Hours As Needed for Nausea or Vomiting.    oxyCODONE (ROXICODONE) 10 MG tablet Take 1 tablet by mouth Every 4 (Four) Hours As Needed for Severe Pain.    oxyCODONE-acetaminophen (PERCOCET) 7.5-325 MG per tablet Take 1 tablet by mouth Every 4 (Four) Hours As Needed for Moderate Pain.    pantoprazole (PROTONIX) 40 MG EC tablet Take 1 tablet by mouth Daily. Take morning of surgery  Indications: Gastroesophageal Reflux Disease    polyethylene glycol (MIRALAX) 17 GM/SCOOP powder Take 17 g by mouth Daily for 10 days.    sennosides-docusate (senna-docusate sodium) 8.6-50 MG per tablet Take 2 tablets by mouth 2 (Two) Times a Day for 30 days.    triamterene-hydrochlorothiazide (DYAZIDE) 37.5-25 MG per capsule Take 1 capsule by mouth Daily. Hold morning of surgery  Indications: High Blood Pressure Disorder     No current facility-administered medications on file prior to visit.         ROS:  14 point review of systems was negative except as listed in the HPI     Physical Exam:   43 y.o. female  Body mass index is 36.66 kg/m²., 88 kg (194 lb)  Vitals:    01/02/24 1604   Resp: 20   SpO2: 99%         General: Alert,  cooperative, appears well and in no observable distress.   HEENT: Normocephalic, atraumatic on external visual inspection. No icterus.   CV: No significant peripheral edema.   Respiratory: Normal respiratory effort.   Skin: Warm & well perfused; appropriate skin turgor.  Psych: Appropriate mood & affect.  Neuro: Gross sensation and motor intact in affected extremity/extremities.  Vascular: Peripheral pulses palpable in affected extremity/extremities. Calves/compartments soft and nontender, no evidence of DVT or compartment syndrome.    Ortho Exam      Left knee  Surgical site well approximated and closed with surgical glue  Expected post op edema. Minimal bruising  Decreased sensation  No drainage. No s/s infection       Investigations:  Post op imaging reviewed - normal hardware function             Assessment:  Diagnoses and all orders for this visit:    1. Status post left partial knee replacement (Primary)       Body mass index is 36.66 kg/m².  BMI consistent with Obese Class II: 35-39.9kg/m2        Plan:  Continue physical therapy and ADLs as tolerated.  Educated on fall precautions.  Continue apixaban for a total of 2 weeks and then transition to full dose aspirin 325 mg p.o. daily until follow-up.  Postoperative pain. May continue Tylenol as needed.  Encouraged to call for refills as needed with Oxycodone.   Postoperative constipation.  Well-controlled  Keep the incision covered with a clean, dry dressing daily until healing over with skin.  Continue to hold supplements and multivitamins while taking apixaban.  Educated on dental/GYN/ procedures and preprocedural antibiotic requirement/recommendation  Continue elevation, ice and compression  BMI reviewed  Follow-up in 1 week with Dr. Pretty  Patient encouraged to call with questions or concerns in the interim       HARRIET Singer

## 2024-01-03 ENCOUNTER — HOSPITAL ENCOUNTER (OUTPATIENT)
Dept: PHYSICAL THERAPY | Facility: HOSPITAL | Age: 44
Setting detail: THERAPIES SERIES
Discharge: HOME | End: 2024-01-03
Payer: COMMERCIAL

## 2024-01-03 DIAGNOSIS — Z96.652 STATUS POST LEFT PARTIAL KNEE REPLACEMENT: Primary | ICD-10-CM

## 2024-01-03 PROCEDURE — 97140 MANUAL THERAPY 1/> REGIONS: CPT

## 2024-01-03 PROCEDURE — 97110 THERAPEUTIC EXERCISES: CPT

## 2024-01-03 NOTE — THERAPY TREATMENT NOTE
Outpatient Physical Therapy Ortho Treatment Note   Jazz Bullock     Patient Name: Aida Peralta  : 1980  MRN: [id number]  Today's Date: 1/3/2024      Visit Date: 2024    Visit Dx:    ICD-10-CM ICD-9-CM   1. Status post left partial knee replacement  Z96.652 V43.65       Patient Active Problem List   Diagnosis    YULIA (generalized anxiety disorder)    History of seizure    Hypertension    Hypothyroidism    Primary osteoarthritis of both knees    Morbid obesity    Plica of knee, left    Adjustment disorder    Status post right partial knee replacement        Past Medical History:   Diagnosis Date    CTS (carpal tunnel syndrome)     GERD (gastroesophageal reflux disease)     Hypertension     Migraines     Plica of knee, left 10/22/2021    Primary osteoarthritis of both knees 10/22/2021    Seizure     one in  , no meds    Sleep apnea     no machine        Past Surgical History:   Procedure Laterality Date    CHOLECYSTECTOMY      KNEE ARTHROPLASTY, PARTIAL REPLACEMENT Right 2023    Procedure: KNEE ARTHROPLASTY, PARTIAL REPLACEMENT;  Surgeon: Siddhartha Pretty MD;  Location: [facility] MAIN OR;  Service: Robotics - Ortho;  Laterality: Right;    TOTAL KNEE ARTHROPLASTY Left 2023    Procedure: KNEE ARTHROPLASTY UNICOMPARTMENTAL;  Surgeon: Siddhartha Pretty MD;  Location: [facility] MAIN OR;  Service: Orthopedics;  Laterality: Left;    TUBAL ABDOMINAL LIGATION          PT Ortho       Row Name 24 1400       Subjective    Subjective Comments pt states her knee is painful today - no obvious cause; follow up with ortho yesterday went well  -       Subjective Pain    Able to rate subjective pain? yes  -    Pre-Treatment Pain Level 5  -       Gait/Stairs (Locomotion)    Comment, (Gait/Stairs) Pt ambulates into clinic with FWW;  moderate+ antalgic gait with slow pace, no heel strike and minimal to no active knee flexion/extension  -              User Key  (r) = Recorded By, (t) = Taken By, (c) =  Cosigned By      Initials Name Provider Type     AnnabellaEdi PTA Physical Therapist Assistant                                 PT Assessment/Plan       Row Name 01/03/24 1443          PT Assessment    Assessment Comments pt continues with antalgic gait; decreased tolerance to PROM  and presents with decreased flexion ROM; extension ROM decreased initially but did recover with stretches; good tolerance to addition of Russian stim and able to initiate SLR (I) this session; due to increased pain, did not try the bike for ROM this session but did start standing hamstring curls  -        PT Plan    PT Plan Comments Cont to progress as tolerated  -               User Key  (r) = Recorded By, (t) = Taken By, (c) = Cosigned By      Initials Name Provider Type     Carolann Winchesterangus Gonzales PTA Physical Therapist Assistant                       OP Exercises       Row Name 01/03/24 1501 01/03/24 1400          Precautions    Existing Precautions/Restrictions -- no known precautions/restrictions  -        Subjective    Subjective Comments -- pt states her knee is painful today - no obvious cause; follow up with ortho yesterday went well  -        Subjective Pain    Able to rate subjective pain? -- yes  -     Pre-Treatment Pain Level -- 5  -        Total Minutes    64423 - PT Therapeutic Exercise Minutes 45  -MH --     40283 - PT Manual Therapy Minutes 10  -MH --        Exercise 1    Exercise Name 1 -- Heelslides with sheet  -     Cueing 1 -- Verbal;Tactile;Demo  [facility]     Time 1 -- 6 minutes  -        Exercise 2    Exercise Name 2 -- Wallslides  -     Cueing 2 -- Verbal;Tactile;[facility]     Time 2 -- 6 minutes  -        Exercise 3    Exercise Name 3 -- Bike  -        Exercise 4    Exercise Name 4 -- supine HS stretch with sheet  -     Cueing 4 -- Verbal;Tactile;Demo  [facility]     Reps 4 -- 10  -     Time 4 -- 10 sec  -     Additional Comments -- NWB gastroc stretch 5 x 20 sec  -        Exercise 5     Exercise Name 5 -- QS over single towel roll  -     Cueing 5 -- Verbal;Tactile;Demo  -     Sets 5 -- Swiss STIM (10/10)  -     Reps 5 -- --  -     Time 5 -- 10 min  -        Exercise 6    Exercise Name 6 -- SAQ  -        Exercise 7    Exercise Name 7 -- S/L hip abduction/adduction  -     Cueing 7 -- Verbal;Tactile;Demo  -     Reps 7 -- 15  -        Exercise 8    Exercise Name 8 -- SLR  -     Cueing 8 -- Verbal;Tactile;Demo  -     Reps 8 -- 10  -     Time 8 -- 2 sec  -     Additional Comments -- able to initiate lifts without assist  -        Exercise 9    Exercise Name 9 -- supine GS  -     Cueing 9 -- --  -     Reps 9 -- HEP  -     Time 9 -- --  -        Exercise 10    Exercise Name 10 -- Heel prop  -     Cueing 10 -- Verbal;Tactile;Demo  -     Time 10 -- 4 minutes  -        Exercise 11    Exercise Name 11 -- seated march  -     Cueing 11 -- Verbal;Tactile;Demo  -     Reps 11 -- 15  -     Time 11 -- chair  -        Exercise 12    Exercise Name 12 -- LAQ  -     Cueing 12 -- Verbal;Tactile;Demo  -     Reps 12 -- 15  -     Time 12 -- chair  -        Exercise 13    Exercise Name 13 -- seated ball squeeze  -     Cueing 13 -- Verbal;Tactile;Demo  -     Reps 13 -- 15  -     Time 13 -- 5 sec  -     Additional Comments -- chair  -        Exercise 14    Exercise Name 14 -- standing calf raises  -     Cueing 14 -- Verbal;Tactile;Demo  -     Reps 14 -- 15  -        Exercise 15    Exercise Name 15 -- standing Hamstring curl  -     Cueing 15 -- Verbal;Tactile;[facility]  -     Reps 15 -- 10  -               User Key  (r) = Recorded By, (t) = Taken By, (c) = Cosigned By      Initials Name Provider Type     Edi Winchester, PTA Physical Therapist Assistant                             Manual Rx (last 36 hours)       Manual Treatments       Row Name 01/03/24 1501 01/03/24 1400          Total Minutes    15059 - PT Manual Therapy Minutes 10  - --         Manual Rx 2    Manual Rx 2 Location -- Seated passive left knee flexion  -     Manual Rx 2 Duration -- 10 x 10 sec  -        Manual Rx 3    Manual Rx 3 Location -- Posterior tibial mobs  -     Manual Rx 3 Duration -- attempted but pt did not tolerate due to pain  -               User Key  (r) = Recorded By, (t) = Taken By, (c) = Cosigned By      Initials Name Provider Type     Edi Winchester PTA Physical Therapist Assistant                        Therapy Education  Education Details: Discussed importance of elevating for edema but to also spend time throughout the day with her feet down to promote knee ROM; written instruction of standing hamstring curl issued and reviewed; pt questioned if she could start using cane - encouraged trial at home first and progress as tolerated - instructed pt to use cane in (R) hand  Given: HEP, Symptoms/condition management, Mobility training  Program: Reinforced  How Provided: Verbal, Demonstration  Provided to: Patient  Level of Understanding: Teach back education performed, Verbalized, Demonstrated              Time Calculation:   Start Time: 1350  Stop Time: 1510  Time Calculation (min): 80 min  Timed Charges  77698 - PT Therapeutic Exercise Minutes: 45  03102 - PT Manual Therapy Minutes: 10  Total Minutes  Timed Charges Total Minutes: 55   Total Minutes: 55  Therapy Charges for Today       Code Description Service Date Service Provider Modifiers Qty    13984457926 HC PT THER PROC EA 15 MIN 1/3/2024 Edi Winchester PTA GP 3    83348293635 HC PT MANUAL THERAPY EA 15 MIN 1/3/2024 Edi Winchester PTA GP 1                      Edi Winchester PTA  1/3/2024

## 2024-01-04 DIAGNOSIS — M17.0 BILATERAL PRIMARY OSTEOARTHRITIS OF KNEE: ICD-10-CM

## 2024-01-04 RX ORDER — OXYCODONE AND ACETAMINOPHEN 7.5; 325 MG/1; MG/1
1 TABLET ORAL EVERY 4 HOURS PRN
Qty: 30 TABLET | Refills: 0 | Status: SHIPPED | OUTPATIENT
Start: 2024-01-04

## 2024-01-05 ENCOUNTER — HOSPITAL ENCOUNTER (OUTPATIENT)
Dept: PHYSICAL THERAPY | Facility: HOSPITAL | Age: 44
Setting detail: THERAPIES SERIES
Discharge: HOME | End: 2024-01-05
Payer: COMMERCIAL

## 2024-01-05 DIAGNOSIS — Z96.652 STATUS POST LEFT PARTIAL KNEE REPLACEMENT: Primary | ICD-10-CM

## 2024-01-05 PROCEDURE — 97140 MANUAL THERAPY 1/> REGIONS: CPT

## 2024-01-05 PROCEDURE — 97110 THERAPEUTIC EXERCISES: CPT

## 2024-01-05 NOTE — THERAPY TREATMENT NOTE
"  Outpatient Physical Therapy Ortho Treatment Note   Jazz Bullock     Patient Name: Aida Peralta  : 1980  MRN: [id number]  Today's Date: 2024      Visit Date: 2024    Visit Dx:    ICD-10-CM ICD-9-CM   1. Status post left partial knee replacement  Z96.652 V43.65       Patient Active Problem List   Diagnosis    YULIA (generalized anxiety disorder)    History of seizure    Hypertension    Hypothyroidism    Primary osteoarthritis of both knees    Morbid obesity    Plica of knee, left    Adjustment disorder    Status post right partial knee replacement        Past Medical History:   Diagnosis Date    CTS (carpal tunnel syndrome)     GERD (gastroesophageal reflux disease)     Hypertension     Migraines     Plica of knee, left 10/22/2021    Primary osteoarthritis of both knees 10/22/2021    Seizure     one in  , no meds    Sleep apnea     no machine        Past Surgical History:   Procedure Laterality Date    CHOLECYSTECTOMY      KNEE ARTHROPLASTY, PARTIAL REPLACEMENT Right 2023    Procedure: KNEE ARTHROPLASTY, PARTIAL REPLACEMENT;  Surgeon: Siddhartha Pretty MD;  Location: [facility] MAIN OR;  Service: Robotics - Ortho;  Laterality: Right;    TOTAL KNEE ARTHROPLASTY Left 2023    Procedure: KNEE ARTHROPLASTY UNICOMPARTMENTAL;  Surgeon: Siddhartha Pretty MD;  Location: [facility] MAIN OR;  Service: Orthopedics;  Laterality: Left;    TUBAL ABDOMINAL LIGATION          PT Ortho       Row Name 24 1500       Left Lower Ext    Lt Knee Extension/Flexion AROM 0 degrees supine extension after stretching  -LN    Lt Knee Extension/Flexion PROM seated passive knee flexion= 70 degrees  -LN      Row Name 24 1400       Subjective    Subjective Comments Pt reports she is still having knee pain but better than the other day when she was here. \"I was able to do my exercises at home yesterday.\"  -LN       Subjective Pain    Able to rate subjective pain? yes  -LN    Pre-Treatment Pain Level 3  3-4/10  -LN      " "        User Key  (r) = Recorded By, (t) = Taken By, (c) = Cosigned By      Initials Name Provider Type    Gloria Wolf, PT Physical Therapist                                 PT Assessment/Plan       Row Name 01/05/24 1600          PT Assessment    Assessment Comments Pt still with persistent L knee pain but less than it was at last visit. Pt still has decreased tolerance to passive flexion stretching and tends to tense up. Decreased passive flexion measured today but most likely due to her increased pain this week. She was able to do bike today (rocking back and forth) for 5 minutes. Decreased antalgic gait noted overall today but she still tends to keep L knee stiff with walking. Good knee extension.  -LN        PT Plan    PT Frequency 2x/week  -LN     PT Plan Comments Cont to progress as tolerated; CP PRN.  -LN               User Key  (r) = Recorded By, (t) = Taken By, (c) = Cosigned By      Initials Name Provider Type    Gloria Wolf, PT Physical Therapist                     Modalities       Row Name 01/05/24 1400             Ice    Ice Applied Yes  -LN      Location anterior and posterior L knee with patient supine.  -LN      PT Ice Rx Minutes 10  -LN      Ice S/P Rx Yes  -LN      Patient reports will apply ice at home to involved area Yes  -LN                User Key  (r) = Recorded By, (t) = Taken By, (c) = Cosigned By      Initials Name Provider Type    Gloria Wolf, PT Physical Therapist                   OP Exercises       Row Name 01/05/24 1400 01/05/24 1300          Precautions    Existing Precautions/Restrictions no known precautions/restrictions  -LN --        Subjective    Subjective Comments Pt reports she is still having knee pain but better than the other day when she was here. \"I was able to do my exercises at home yesterday.\"  -LN --        Subjective Pain    Able to rate subjective pain? yes  -LN --     Pre-Treatment Pain Level 3  3-4/10  -LN --        " Total Minutes    38746 - PT Therapeutic Exercise Minutes 45  -LN --     19357 - PT Manual Therapy Minutes -- 12  -LN        Exercise 1    Exercise Name 1 Heelslides with sheet  -LN --     Cueing 1 Verbal;Tactile;Demo  -LN --     Time 1 6 minutes  -LN --        Exercise 2    Exercise Name 2 Wallslides  -LN --     Cueing 2 Verbal;Tactile;Demo  -LN --     Time 2 6 minutes  -LN --        Exercise 3    Exercise Name 3 Bike- airdyne seat 2  -LN --     Cueing 3 Verbal;Tactile;Demo  -LN --     Time 3 5 minutes; rocking back and forth  -LN --        Exercise 4    Exercise Name 4 supine HS stretch with sheet  -LN --     Cueing 4 Verbal;Tactile;Demo  -LN --     Reps 4 10  -LN --     Time 4 10 sec  -LN --        Exercise 5    Exercise Name 5 QS over single towel roll  -LN --     Cueing 5 Verbal;Tactile;Demo  -LN --     Sets 5 [place]ian STIM (10/10)  -LN --     Time 5 10 min  -LN --        Exercise 6    Exercise Name 6 SAQ  -LN --        Exercise 7    Exercise Name 7 S/L hip abduction/adduction  -LN --     Cueing 7 Verbal;Tactile;Demo  -LN --     Reps 7 15  -LN --        Exercise 8    Exercise Name 8 SLR  -LN --     Cueing 8 Verbal;Tactile;Demo  -LN --     Reps 8 10  -LN --     Time 8 2 sec  -LN --        Exercise 9    Exercise Name 9 supine GS  -LN --     Reps 9 HEP  -LN --        Exercise 10    Exercise Name 10 Heel prop  -LN --     Cueing 10 Verbal;Tactile;Demo  -LN --     Time 10 4 minutes  -LN --        Exercise 11    Exercise Name 11 seated march  -LN --     Cueing 11 Verbal;Tactile;Demo  -LN --     Reps 11 15  -LN --     Time 11 chair  -LN --        Exercise 12    Exercise Name 12 LAQ  -LN --     Cueing 12 Verbal;Tactile;Demo  -LN --     Reps 12 15  -LN --     Time 12 chair  -LN --        Exercise 13    Exercise Name 13 seated ball squeeze  -LN --     Cueing 13 Verbal;Tactile;Demo  -LN --     Reps 13 15  -LN --     Time 13 5 sec  -LN --     Additional Comments chair  -LN --        Exercise 14    Exercise Name 14 standing  calf raises  -LN --     Cueing 14 Verbal;Tactile;Demo  -LN --     Reps 14 15  -LN --        Exercise 15    Exercise Name 15 standing Hamstring curl  -LN --     Cueing 15 Verbal;Tactile;Demo  -LN --     Reps 15 15  -LN --               User Key  (r) = Recorded By, (t) = Taken By, (c) = Cosigned By      Initials Name Provider Type    LN Gloria Kan, PT Physical Therapist                             Manual Rx (last 36 hours)       Manual Treatments       Row Name 01/05/24 1300             Total Minutes    99069 - PT Manual Therapy Minutes 12  -LN         Manual Rx 2    Manual Rx 2 Location Seated passive left knee flexion  -LN      Manual Rx 2 Duration 10 x 10 sec  -LN         Manual Rx 3    Manual Rx 3 Location Posterior tibial mobs  -LN      Manual Rx 3 Duration 3 x 5 osc gentle  -LN                User Key  (r) = Recorded By, (t) = Taken By, (c) = Cosigned By      Initials Name Provider Type    LN Gloria Kan, PT Physical Therapist                        Therapy Education  Education Details: Pt to continue with HEP 1-2 x day as tolerated and to work on bending L knee more with ambulation as well as improved heel to toe gait. Pt to continue with CP & elevation for edema control but to also make sure she spends some time during the day with her feet down to help promote improved knee flexion.  Given: HEP, Symptoms/condition management, Pain management, Mobility training  Program: Reinforced, Progressed  How Provided: Verbal, Demonstration  Provided to: Patient  Level of Understanding: Teach back education performed, Verbalized, Demonstrated              Time Calculation:   Start Time: 1500  Stop Time: 1615  Time Calculation (min): 75 min  Timed Charges  82641 - PT Therapeutic Exercise Minutes: 45  87309 - PT Manual Therapy Minutes: 12  Untimed Charges  PT Ice Rx Minutes: 10  Total Minutes  Timed Charges Total Minutes: 45  Untimed Charges Total Minutes: 10   Total Minutes: 55  Therapy Charges  for Today       Code Description Service Date Service Provider Modifiers Qty    88946388733 HC PT THER PROC EA 15 MIN 1/5/2024 Gloria Kan, PT GP 3    [id number] HC PT MANUAL THERAPY EA 15 MIN 1/5/2024 Gloria Kan, PT GP 1                      Gloria Kan, PT  1/5/2024

## 2024-01-09 ENCOUNTER — HOSPITAL ENCOUNTER (OUTPATIENT)
Dept: PHYSICAL THERAPY | Facility: HOSPITAL | Age: 44
Setting detail: THERAPIES SERIES
Discharge: HOME | End: 2024-01-09
Payer: COMMERCIAL

## 2024-01-09 DIAGNOSIS — Z96.652 STATUS POST LEFT PARTIAL KNEE REPLACEMENT: Primary | ICD-10-CM

## 2024-01-09 PROCEDURE — 97140 MANUAL THERAPY 1/> REGIONS: CPT | Performed by: PHYSICAL THERAPIST

## 2024-01-09 PROCEDURE — 97110 THERAPEUTIC EXERCISES: CPT | Performed by: PHYSICAL THERAPIST

## 2024-01-09 NOTE — THERAPY TREATMENT NOTE
Outpatient Physical Therapy Ortho Treatment Note   Jazz Bullock     Patient Name: Aida Peralta  : 1980  MRN: [id number]  Today's Date: 2024      Visit Date: 2024    Visit Dx:    ICD-10-CM ICD-9-CM   1. Status post left partial knee replacement  Z96.652 V43.65       Patient Active Problem List   Diagnosis    YULIA (generalized anxiety disorder)    History of seizure    Hypertension    Hypothyroidism    Primary osteoarthritis of both knees    Morbid obesity    Plica of knee, left    Adjustment disorder    Status post right partial knee replacement        Past Medical History:   Diagnosis Date    CTS (carpal tunnel syndrome)     GERD (gastroesophageal reflux disease)     Hypertension     Migraines     Plica of knee, left 10/22/2021    Primary osteoarthritis of both knees 10/22/2021    Seizure     one in  , no meds    Sleep apnea     no machine        Past Surgical History:   Procedure Laterality Date    CHOLECYSTECTOMY      KNEE ARTHROPLASTY, PARTIAL REPLACEMENT Right 2023    Procedure: KNEE ARTHROPLASTY, PARTIAL REPLACEMENT;  Surgeon: Siddhartha Pretty MD;  Location: [facility] OR;  Service: Robotics - Ortho;  Laterality: Right;    TOTAL KNEE ARTHROPLASTY Left 2023    Procedure: KNEE ARTHROPLASTY UNICOMPARTMENTAL;  Surgeon: Siddhartha Pretty MD;  Location: [facility] MAIN OR;  Service: Orthopedics;  Laterality: Left;    TUBAL ABDOMINAL LIGATION          PT Ortho       Row Name 24 1800       Subjective    Subjective Comments Patient reports that her knee is feeling a little better.  -SP       Left Lower Ext    Lt Knee Extension/Flexion AROM 0 degrees after stretching  -SP    Lt Knee Extension/Flexion PROM PROM knee flexion in short sit = 75 degrees  -SP              User Key  (r) = Recorded By, (t) = Taken By, (c) = Cosigned By      Initials Name Provider Type    Carol Hwang, PT Physical Therapist                                 PT Assessment/Plan       Row Name 24  1820          PT Assessment    Assessment Comments Eligio reports gradual improvement in symptoms.  She continues to have significant difficulty tolerating PROM to left knee but does exhibit some improvement in mobility today. Patient continues to use rolling walker for ambulation  -SP        PT Plan    PT Plan Comments Continue to progress left knee ROM and LE strengthening to improve mobility and gait  -SP               User Key  (r) = Recorded By, (t) = Taken By, (c) = Cosigned By      Initials Name Provider Type    Carol Hwang, PT Physical Therapist                     Modalities       Row Name 01/09/24 1800             Precautions    Existing Precautions/Restrictions no known precautions/restrictions  -SP         Ice    Ice Applied Yes  -SP      Location anterior and posterior L knee with patient supine.  -SP      PT Ice Rx Minutes 10  -SP      Ice S/P Rx Yes  -SP      Patient reports will apply ice at home to involved area Yes  -SP                User Key  (r) = Recorded By, (t) = Taken By, (c) = Cosigned By      Initials Name Provider Type    Carol Hwang, PT Physical Therapist                   OP Exercises       Row Name 01/09/24 1822 01/09/24 1800          Precautions    Existing Precautions/Restrictions -- no known precautions/restrictions  -SP        Subjective    Subjective Comments -- Patient reports that her knee is feeling a little better.  -SP        Total Minutes    07044 - PT Therapeutic Exercise Minutes 15  -SP --     96565 - PT Manual Therapy Minutes 15  -SP --        Exercise 1    Exercise Name 1 -- Heelslides with sheet  -SP     Cueing 1 -- Verbal;Tactile;Demo  -SP     Time 1 -- 6 minutes  -SP        Exercise 2    Exercise Name 2 -- Wallslides  -SP     Cueing 2 -- Verbal;Tactile;Demo  -SP     Time 2 -- 6 minutes  -SP        Exercise 3    Exercise Name 3 -- Bike- airdyne seat 2  -SP     Cueing 3 -- Verbal;Tactile;Demo  -SP     Time 3 -- 5 minutes  -SP         Exercise 4    Exercise Name 4 -- supine HS stretch with sheet  -SP     Cueing 4 -- Verbal;Tactile;Demo  -SP     Reps 4 -- 10  -SP     Time 4 -- 10 sec  -SP        Exercise 5    Exercise Name 5 -- QS over single towel roll  -SP     Cueing 5 -- Verbal;Tactile;Demo  -SP     Sets 5 -- Italian STIM (10/10)  -SP     Time 5 -- 10 min  -SP        Exercise 6    Exercise Name 6 -- SAQ  -SP        Exercise 7    Exercise Name 7 -- S/L hip abduction/adduction  -SP     Cueing 7 -- Verbal;Tactile;Demo  -SP     Reps 7 -- 15  -SP        Exercise 8    Exercise Name 8 -- SLR  -SP     Cueing 8 -- Verbal;Tactile;Demo  -SP     Reps 8 -- 10  -SP     Time 8 -- 2 sec  -SP        Exercise 9    Exercise Name 9 -- supine GS  -SP     Reps 9 -- HEP  -SP        Exercise 10    Exercise Name 10 -- Heel prop  -SP     Cueing 10 -- Verbal;Tactile;Demo  -SP     Time 10 -- 4 minutes  -SP        Exercise 11    Exercise Name 11 -- seated march  -SP     Cueing 11 -- Verbal;Tactile;Demo  -SP     Reps 11 -- 15  -SP     Time 11 -- chair  -SP        Exercise 12    Exercise Name 12 -- LAQ  -SP     Cueing 12 -- Verbal;Tactile;Demo  -SP     Reps 12 -- 15  -SP     Time 12 -- chair  -SP        Exercise 13    Exercise Name 13 -- seated ball squeeze  -SP     Cueing 13 -- Verbal;Tactile;Demo  -SP     Reps 13 -- 15  -SP     Time 13 -- 5 sec  -SP        Exercise 14    Exercise Name 14 -- standing calf raises  -SP     Cueing 14 -- Verbal;Tactile;Demo  -SP     Reps 14 -- 15  -SP        Exercise 15    Exercise Name 15 -- standing Hamstring curl  -SP     Cueing 15 -- Verbal;Tactile;Demo  -SP     Reps 15 -- 15  -SP               User Key  (r) = Recorded By, (t) = Taken By, (c) = Cosigned By      Initials Name Provider Type    Carol Hwang, PT Physical Therapist                             Manual Rx (last 36 hours)       Manual Treatments       Row Name 01/09/24 1822 01/09/24 1800          Total Minutes    95308 - PT Manual Therapy Minutes 15  -SP --         Manual Rx 2    Manual Rx 2 Location -- Seated passive left knee flexion  -SP     Manual Rx 2 Duration -- 10 x 10 sec  -SP        Manual Rx 3    Manual Rx 3 Location -- Posterior tibial mobs  -SP     Manual Rx 3 Duration -- 3 x 5 osc gentle  -SP               User Key  (r) = Recorded By, (t) = Taken By, (c) = Cosigned By      Initials Name Provider Type    SP Carol Enrique, PT Physical Therapist                        Therapy Education  Given: HEP  Program: Reinforced  How Provided: Verbal  Provided to: Patient  Level of Understanding: Verbalized, Demonstrated              Time Calculation:   Start Time: 1450  Stop Time: 1600  Time Calculation (min): 70 min  Timed Charges  80523 - PT Therapeutic Exercise Minutes: 15  92091 - PT Manual Therapy Minutes: 15  Untimed Charges  PT Ice Rx Minutes: 10  Total Minutes  Timed Charges Total Minutes: 30  Untimed Charges Total Minutes: 10   Total Minutes: 30  Therapy Charges for Today       Code Description Service Date Service Provider Modifiers Qty    48270456954 HC PT THER PROC EA 15 MIN 1/9/2024 Carol Enrique, PT GP 1    06109047702 HC PT MANUAL THERAPY EA 15 MIN 1/9/2024 Carol Enrique, PT GP 1                      Carol Enrique, PT  1/9/2024

## 2024-01-10 ENCOUNTER — OFFICE VISIT (OUTPATIENT)
Dept: ORTHOPEDIC SURGERY | Facility: CLINIC | Age: 44
End: 2024-01-10
Payer: COMMERCIAL

## 2024-01-10 VITALS — HEART RATE: 102 BPM | HEIGHT: 61 IN | WEIGHT: 194 LBS | BODY MASS INDEX: 36.63 KG/M2

## 2024-01-10 DIAGNOSIS — Z96.651 STATUS POST RIGHT PARTIAL KNEE REPLACEMENT: Primary | ICD-10-CM

## 2024-01-10 DIAGNOSIS — M17.0 BILATERAL PRIMARY OSTEOARTHRITIS OF KNEE: ICD-10-CM

## 2024-01-10 DIAGNOSIS — Z96.652 STATUS POST LEFT PARTIAL KNEE REPLACEMENT: ICD-10-CM

## 2024-01-10 PROCEDURE — 99024 POSTOP FOLLOW-UP VISIT: CPT | Performed by: ORTHOPAEDIC SURGERY

## 2024-01-10 RX ORDER — OXYCODONE AND ACETAMINOPHEN 7.5; 325 MG/1; MG/1
1 TABLET ORAL EVERY 4 HOURS PRN
Qty: 30 TABLET | Refills: 0 | Status: SHIPPED | OUTPATIENT
Start: 2024-01-10

## 2024-01-10 NOTE — PROGRESS NOTES
POST OP TOTAL GLOBAL      NAME: Aida Peralta           : 1980    MRN: [id number]    Chief Complaint   Patient presents with    Left Knee - Pain, Post-op     Pain 2        Date of Surgery: Patient underwent left partial medial compartmental knee replacement on 2023.  ?  HPI:   Patient returns today for 3 week follow up of left partial medial compartment knee arthroplasty Incision(s) healed nicely with no signs of infection. Patient reports doing well with no unusual complaints. No fevers, rigors or chills. Appears to be progressing appropriately. Patient is on appropriate anticoagulation.       Ortho Exam:   LEFT knee  Left knee.The patient is status post partial medial compartment knee arthroplasty postoperative 3 week(s). Incision is clean. Calf is soft and nontender. Homans sign is negative. There is no clicking, popping or catching. Anterior and posterior drawer signs are negative.  There is no instability of the components. Appropriate amounts of swelling and bruising are noted. Dorsalis pedis and posterior tibial artery pulses are palpable. Common peroneal nerve function is well preserved. Range of motion is from 0-75 degrees of flexion. Gait is cautious but otherwise fairly normal. There is no evidence of a deep seated joint infection.      Diagnostic Studies:  no diagnostic testing performed this visit          Assessment:  Diagnoses and all orders for this visit:    1. Status post right partial knee replacement (Primary)    2. Status post left partial knee replacement            Plan   Incision care.  Aggressive range of motion exercises of the knee with physical therapy.  Falls and dislocation precautions.  She already got a prescription refill for her pain medication today.  To use ACE wrap/ABRAHAM stocking  Continue ice to joint   Aggressive ROM  Falls precautions  Continue with lifelong antibiotic prophylaxis with dental procedures following total joint replacement.  Follow up in 4  week(s) with my nurse practitioner, Ms. Suzi Paris.    Date of encounter: 01/10/2024   Siddhartha Pretty MD

## 2024-01-12 ENCOUNTER — HOSPITAL ENCOUNTER (OUTPATIENT)
Dept: PHYSICAL THERAPY | Facility: HOSPITAL | Age: 44
Setting detail: THERAPIES SERIES
Discharge: HOME | End: 2024-01-12
Payer: COMMERCIAL

## 2024-01-12 DIAGNOSIS — Z96.652 STATUS POST LEFT PARTIAL KNEE REPLACEMENT: Primary | ICD-10-CM

## 2024-01-12 PROCEDURE — 97110 THERAPEUTIC EXERCISES: CPT

## 2024-01-12 PROCEDURE — 97140 MANUAL THERAPY 1/> REGIONS: CPT

## 2024-01-12 NOTE — THERAPY TREATMENT NOTE
"  Outpatient Physical Therapy Ortho Treatment Note   [facility]     Patient Name: Aida Peralta  : 1980  MRN: [id number]  Today's Date: 2024      Visit Date: 2024    Visit Dx:    ICD-10-CM ICD-9-CM   1. Status post left partial knee replacement  Z96.652 V43.65       Patient Active Problem List   Diagnosis    YULIA (generalized anxiety disorder)    History of seizure    Hypertension    Hypothyroidism    Primary osteoarthritis of both knees    Morbid obesity    Plica of knee, left    Adjustment disorder    Status post right partial knee replacement    Status post left partial knee replacement        Past Medical History:   Diagnosis Date    CTS (carpal tunnel syndrome)     GERD (gastroesophageal reflux disease)     Hypertension     Migraines     Plica of knee, left 10/22/2021    Primary osteoarthritis of both knees 10/22/2021    Seizure     one in  , no meds    Sleep apnea     no machine        Past Surgical History:   Procedure Laterality Date    CHOLECYSTECTOMY      KNEE ARTHROPLASTY, PARTIAL REPLACEMENT Right 2023    Procedure: KNEE ARTHROPLASTY, PARTIAL REPLACEMENT;  Surgeon: Siddhartha Pretty MD;  Location: [facility] OR;  Service: Robotics - Ortho;  Laterality: Right;    TOTAL KNEE ARTHROPLASTY Left 2023    Procedure: KNEE ARTHROPLASTY UNICOMPARTMENTAL;  Surgeon: Siddhartha Pretty MD;  Location: [facility] OR;  Service: Orthopedics;  Laterality: Left;    TUBAL ABDOMINAL LIGATION          PT Ortho       Row Name 24 1500       Subjective    Subjective Comments \"My knee is doing okay.\" \"I saw the surgeon and he was happy with how my knee was doing and with how my knee was bending.\"  -LN       Subjective Pain    Able to rate subjective pain? yes  -LN    Pre-Treatment Pain Level 3  -LN       Left Lower Ext    Lt Knee Extension/Flexion AROM 0 degrees after stretching; knee flexion 70 degrees seated.  -LN    Lt Knee Extension/Flexion PROM PROM knee flexion in short sit = 75 degrees " " -LN              User Key  (r) = Recorded By, (t) = Taken By, (c) = Cosigned By      Initials Name Provider Type    Gloria Wolf, PT Physical Therapist                                 PT Assessment/Plan       Row Name 01/12/24 1500          PT Assessment    Assessment Comments Pt tolerated treatment fairly well but still has decreased tolerance to passive stretching for flexion and she still tends to stiffen up and resist the stretching. She continues to use FWW for ambulation; has tried her SPC but reports she isn't ready for it due to her L knee feeling like it wants to give out on her. She is bending her left knee a little better with ambulation.  -LN        PT Plan    PT Frequency 2x/week  -LN     PT Plan Comments Continue to progress left knee ROM and LE strengthening to improve mobility and gait. CP PRN.  -LN               User Key  (r) = Recorded By, (t) = Taken By, (c) = Cosigned By      Initials Name Provider Type    Gloria Wolf, PT Physical Therapist                     Modalities       Row Name 01/12/24 1500             Ice    Ice Applied Yes  -LN      Location anterior and posterior L knee with patient supine.  -LN      PT Ice Rx Minutes 10  -LN      Ice S/P Rx Yes  -LN      Patient reports will apply ice at home to involved area Yes  -LN                User Key  (r) = Recorded By, (t) = Taken By, (c) = Cosigned By      Initials Name Provider Type    Gloria Wolf, PT Physical Therapist                   OP Exercises       Row Name 01/12/24 1500             Precautions    Existing Precautions/Restrictions no known precautions/restrictions  -LN         Subjective    Subjective Comments \"My knee is doing okay.\" \"I saw the surgeon and he was happy with how my knee was doing and with how my knee was bending.\"  -LN         Subjective Pain    Able to rate subjective pain? yes  -LN      Pre-Treatment Pain Level 3  -LN         Total Minutes    77205 - PT Therapeutic " Exercise Minutes 45  -LN      34615 - PT Manual Therapy Minutes 15  -LN         Exercise 1    Exercise Name 1 Heelslides with sheet  -LN      Cueing 1 Verbal;Tactile;Demo  -LN      Time 1 7 minutes  -LN         Exercise 2    Exercise Name 2 Wallslides  -LN      Cueing 2 Verbal;Tactile;Demo  -LN      Time 2 7 minutes  -LN         Exercise 3    Exercise Name 3 Bike- airdyne seat 2  -LN      Cueing 3 Verbal;Tactile;Demo  -LN      Time 3 6 minutes  -LN         Exercise 4    Exercise Name 4 supine HS stretch with sheet  -LN      Cueing 4 Verbal;Tactile;Demo  -LN      Reps 4 10  -LN      Time 4 10 sec  -LN         Exercise 5    Exercise Name 5 QS over single towel roll  -LN      Cueing 5 Verbal;Tactile;Demo  -LN      Sets 5 Greek STIM (10/10)  -LN      Time 5 10 min  -LN         Exercise 6    Exercise Name 6 SAQ  -LN         Exercise 7    Exercise Name 7 S/L hip abduction/adduction  -LN      Cueing 7 Verbal;Tactile;Demo  -LN      Reps 7 15  -LN         Exercise 8    Exercise Name 8 SLR  -LN      Cueing 8 Verbal;Tactile;Demo  -LN      Reps 8 10  -LN      Time 8 2 sec  -LN      Additional Comments needed assist to initiate first SLR but then independent  -LN         Exercise 9    Exercise Name 9 supine GS  -LN      Reps 9 HEP  -LN         Exercise 10    Exercise Name 10 Heel prop  -LN      Cueing 10 Verbal;Tactile;Demo  -LN      Time 10 5 minutes  -LN         Exercise 11    Exercise Name 11 seated march  -LN      Cueing 11 Verbal;Tactile;Demo  -LN      Reps 11 15  -LN      Time 11 chair  -LN         Exercise 12    Exercise Name 12 LAQ  -LN      Cueing 12 Verbal;Tactile;Demo  -LN      Reps 12 15  -LN      Time 12 chair  -LN         Exercise 13    Exercise Name 13 seated ball squeeze  -LN      Cueing 13 Verbal;Tactile;Demo  -LN      Reps 13 15  -LN      Time 13 5 sec  -LN         Exercise 14    Exercise Name 14 standing calf raises  -LN      Cueing 14 Verbal;Tactile;Demo  -LN      Reps 14 15  -LN         Exercise 15     Exercise Name 15 standing Hamstring curl  -LN      Cueing 15 Verbal;Tactile;Demo  -LN      Reps 15 15  -LN         Exercise 16    Exercise Name 16 Forward step ups on 4 inch step  -LN      Cueing 16 Verbal;Tactile;Demo  -LN      Reps 16 10 each  -LN                User Key  (r) = Recorded By, (t) = Taken By, (c) = Cosigned By      Initials Name Provider Type    LN Gloria Kan, PT Physical Therapist                             Manual Rx (last 36 hours)       Manual Treatments       Row Name 01/12/24 1500             Total Minutes    21484 - PT Manual Therapy Minutes 15  -LN         Manual Rx 2    Manual Rx 2 Location Seated passive left knee flexion  -LN      Manual Rx 2 Duration 10 x 10 sec  -LN         Manual Rx 3    Manual Rx 3 Location Posterior tibial mobs  -LN      Manual Rx 3 Duration 3 x 5 osc gentle  -LN                User Key  (r) = Recorded By, (t) = Taken By, (c) = Cosigned By      Initials Name Provider Type    LN Gloria Kan, PT Physical Therapist                        Therapy Education  Given: HEP, Symptoms/condition management, Edema management, Mobility training  Program: Reinforced  How Provided: Verbal  Provided to: Patient  Level of Understanding: Teach back education performed, Verbalized, Demonstrated              Time Calculation:   Start Time: 1500  Stop Time: 1622  Time Calculation (min): 82 min  Timed Charges  64907 - PT Therapeutic Exercise Minutes: 45  93246 - PT Manual Therapy Minutes: 15  Untimed Charges  PT Ice Rx Minutes: 10  Total Minutes  Timed Charges Total Minutes: 60  Untimed Charges Total Minutes: 10   Total Minutes: 70  Therapy Charges for Today       Code Description Service Date Service Provider Modifiers Qty    62174069881 HC PT THER PROC EA 15 MIN 1/12/2024 Gloria Kan, PT GP 3    56767648335 HC PT MANUAL THERAPY EA 15 MIN 1/12/2024 Gloria Kan, PT GP 1                      Gloria Kan PT  1/12/2024

## 2024-01-16 ENCOUNTER — HOSPITAL ENCOUNTER (OUTPATIENT)
Dept: PHYSICAL THERAPY | Facility: HOSPITAL | Age: 44
Setting detail: THERAPIES SERIES
Discharge: HOME | End: 2024-01-16
Payer: COMMERCIAL

## 2024-01-16 DIAGNOSIS — Z96.652 STATUS POST LEFT PARTIAL KNEE REPLACEMENT: Primary | ICD-10-CM

## 2024-01-16 DIAGNOSIS — M17.0 BILATERAL PRIMARY OSTEOARTHRITIS OF KNEE: ICD-10-CM

## 2024-01-16 PROCEDURE — 97140 MANUAL THERAPY 1/> REGIONS: CPT

## 2024-01-16 PROCEDURE — 97110 THERAPEUTIC EXERCISES: CPT

## 2024-01-16 RX ORDER — OXYCODONE AND ACETAMINOPHEN 7.5; 325 MG/1; MG/1
1 TABLET ORAL EVERY 8 HOURS PRN
Qty: 30 TABLET | Refills: 0 | Status: SHIPPED | OUTPATIENT
Start: 2024-01-16 | End: 2024-01-22 | Stop reason: SDUPTHER

## 2024-01-16 NOTE — THERAPY TREATMENT NOTE
"  Outpatient Physical Therapy Ortho Treatment Note   [facility] Bull[facility]     Patient Name: Aida Peralta  : 1980  MRN: [id number]  Today's Date: 2024      Visit Date: 2024    Visit Dx:    ICD-10-CM ICD-9-CM   1. Status post left partial knee replacement  Z96.652 V43.65       Patient Active Problem List   Diagnosis    YULIA (generalized anxiety disorder)    History of seizure    Hypertension    Hypothyroidism    Primary osteoarthritis of both knees    Morbid obesity    Plica of knee, left    Adjustment disorder    Status post right partial knee replacement    Status post left partial knee replacement        Past Medical History:   Diagnosis Date    CTS (carpal tunnel syndrome)     GERD (gastroesophageal reflux disease)     Hypertension     Migraines     Plica of knee, left 10/22/2021    Primary osteoarthritis of both knees 10/22/2021    Seizure     one in  , no meds    Sleep apnea     no machine        Past Surgical History:   Procedure Laterality Date    CHOLECYSTECTOMY      KNEE ARTHROPLASTY, PARTIAL REPLACEMENT Right 2023    Procedure: KNEE ARTHROPLASTY, PARTIAL REPLACEMENT;  Surgeon: Siddhartha Pretty MD;  Location: [facility] OR;  Service: Robotics - Ortho;  Laterality: Right;    TOTAL KNEE ARTHROPLASTY Left 2023    Procedure: KNEE ARTHROPLASTY UNICOMPARTMENTAL;  Surgeon: Siddhartha Pretty MD;  Location: [facility] MAIN OR;  Service: Orthopedics;  Laterality: Left;    TUBAL ABDOMINAL LIGATION          PT Ortho       Row Name 24 1500       Subjective    Subjective Comments \"I started using the cane today; I got tired of using the walker.\" \"I'm doing pretty good with it but my knee is a little sore walking with the cane but not bad.\"  -LN       Subjective Pain    Able to rate subjective pain? yes  -LN    Pre-Treatment Pain Level 1  0-1/10 per pt  -LN       Posture/Observations    Posture/Observations Comments Pt arrived to PT clinic, ambulating with SPC.  -LN       Left Lower Ext    Lt Knee " "Extension/Flexion AROM 0 degrees after stretching.  -LN    Lt Knee Extension/Flexion PROM PROM knee flexion in sitting = 77 degrees  -LN              User Key  (r) = Recorded By, (t) = Taken By, (c) = Cosigned By      Initials Name Provider Type    Gloria Wolf, PT Physical Therapist                                 PT Assessment/Plan       Row Name 01/16/24 1700          PT Assessment    Assessment Comments Pt tolerated treatment fairly well but still has decreased tolerance to passive knee flexion stretching. Knee flexion improving slowly; good knee extension. She ambulated with SPC today into PT clinic with minimal antalgic gait noted.  -LN        PT Plan    PT Frequency 2x/week  -LN     PT Plan Comments Continue to progress left knee ROM and LE strengthening to improve mobility and gait. CP PRN.  -LN               User Key  (r) = Recorded By, (t) = Taken By, (c) = Cosigned By      Initials Name Provider Type    Gloria Wolf, PT Physical Therapist                     Modalities       Row Name 01/16/24 1700             Ice    Ice Applied Yes  -LN      Location anterior and posterior L knee with patient supine.  -LN      PT Ice Rx Minutes 10  -LN      Ice S/P Rx Yes  -LN      Patient reports will apply ice at home to involved area Yes  -LN                User Key  (r) = Recorded By, (t) = Taken By, (c) = Cosigned By      Initials Name Provider Type    Gloria Wolf, PT Physical Therapist                   OP Exercises       Row Name 01/16/24 1700 01/16/24 1500          Precautions    Existing Precautions/Restrictions -- no known precautions/restrictions  -LN        Subjective    Subjective Comments -- \"I started using the cane today; I got tired of using the walker.\" \"I'm doing pretty good with it but my knee is a little sore walking with the cane but not bad.\"  -LN        Subjective Pain    Able to rate subjective pain? -- yes  -LN     Pre-Treatment Pain Level -- 1  0-1/10 " per pt  -LN        Total Minutes    53747 - PT Therapeutic Exercise Minutes -- 45  -LN     48472 - PT Manual Therapy Minutes 15  -LN --        Exercise 1    Exercise Name 1 -- Heelslides with sheet  -LN     Cueing 1 -- Verbal;Tactile;Demo  -LN     Time 1 -- 7 minutes  -LN        Exercise 2    Exercise Name 2 -- Wallslides  -LN     Cueing 2 -- Verbal;Tactile;Demo  -LN     Time 2 -- 7 minutes  -LN        Exercise 3    Exercise Name 3 -- Bike- airdyne seat 2  -LN     Cueing 3 -- Verbal;Tactile;Demo  -LN     Time 3 -- 7 minutes  -LN        Exercise 4    Exercise Name 4 -- supine HS stretch with sheet  -LN     Cueing 4 -- Verbal;Tactile;Demo  -LN     Reps 4 -- 10  -LN     Time 4 -- 10 sec  -LN        Exercise 5    Exercise Name 5 -- QS over single towel roll  -LN     Cueing 5 -- Verbal;Tactile;Demo  -LN     Sets 5 -- Polish STIM (10/10)  -LN     Time 5 -- 10 min  -LN        Exercise 6    Exercise Name 6 -- SAQ  -LN        Exercise 7    Exercise Name 7 -- S/L hip abduction/adduction  -LN     Cueing 7 -- Verbal;Tactile;Demo  -LN     Reps 7 -- 15  -LN        Exercise 8    Exercise Name 8 -- SLR  -LN     Cueing 8 -- Verbal;Tactile;Demo  -LN     Reps 8 -- 15  -LN     Time 8 -- 2 sec  -LN     Additional Comments -- no assist needed  -LN        Exercise 9    Exercise Name 9 -- supine GS  -LN     Reps 9 -- HEP  -LN        Exercise 10    Exercise Name 10 -- Heel prop  -LN     Cueing 10 -- Verbal;Tactile;Demo  -LN     Time 10 -- 5 minutes  -LN        Exercise 11    Exercise Name 11 -- seated march  -LN     Cueing 11 -- Verbal;Tactile;Demo  -LN     Reps 11 -- 15  -LN     Time 11 -- chair  -LN        Exercise 12    Exercise Name 12 -- LAQ  -LN     Cueing 12 -- Verbal;Tactile;Demo  -LN     Reps 12 -- 20  -LN     Time 12 -- chair  -LN        Exercise 13    Exercise Name 13 -- seated ball squeeze  -LN     Cueing 13 -- Verbal;Tactile;Demo  -LN     Reps 13 -- 20  -LN     Time 13 -- 5 sec  -LN        Exercise 14    Exercise Name 14 --  standing calf raises  -LN     Cueing 14 -- Verbal;Tactile;Demo  -LN     Reps 14 -- 20  -LN        Exercise 15    Exercise Name 15 -- standing Hamstring curl  -LN     Cueing 15 -- Verbal;Tactile;Demo  -LN     Reps 15 -- 20  -LN        Exercise 16    Exercise Name 16 -- Forward step ups on 4 inch step  -LN     Cueing 16 -- Verbal;Tactile;Demo  -LN     Reps 16 -- 15 each  -LN               User Key  (r) = Recorded By, (t) = Taken By, (c) = Cosigned By      Initials Name Provider Type    LN Gloria Kan, PT Physical Therapist                             Manual Rx (last 36 hours)       Manual Treatments       Row Name 01/16/24 1700             Total Minutes    10673 - PT Manual Therapy Minutes 15  -LN         Manual Rx 2    Manual Rx 2 Location Seated passive left knee flexion  -LN      Manual Rx 2 Duration 10 x 10 sec  -LN         Manual Rx 3    Manual Rx 3 Location Posterior tibial mobs  -LN      Manual Rx 3 Duration 3 x 5 osc gentle  -LN                User Key  (r) = Recorded By, (t) = Taken By, (c) = Cosigned By      Initials Name Provider Type    LN Gloria Kan, PT Physical Therapist                        Therapy Education  Given: HEP, Symptoms/condition management, Edema management, Mobility training  Program: Reinforced  How Provided: Verbal  Provided to: Patient  Level of Understanding: Teach back education performed, Verbalized, Demonstrated              Time Calculation:   Start Time: 1534  Stop Time: 1700  Time Calculation (min): 86 min  Timed Charges  60143 - PT Therapeutic Exercise Minutes: 45  42903 - PT Manual Therapy Minutes: 15  Untimed Charges  PT Ice Rx Minutes: 10  Total Minutes  Timed Charges Total Minutes: 15  Untimed Charges Total Minutes: 10   Total Minutes: 25  Therapy Charges for Today       Code Description Service Date Service Provider Modifiers Qty    77476410461 HC PT THER PROC EA 15 MIN 1/16/2024 Gloria Kan, PT GP 3    42629867903 HC PT MANUAL THERAPY  EA 15 MIN 1/16/2024 Gloria Kan, PT GP 1                      Gloria Kan, PT  1/16/2024

## 2024-01-19 ENCOUNTER — APPOINTMENT (OUTPATIENT)
Dept: PHYSICAL THERAPY | Facility: HOSPITAL | Age: 44
End: 2024-01-19
Payer: COMMERCIAL

## 2024-01-19 ENCOUNTER — DOCUMENTATION (OUTPATIENT)
Dept: PHYSICAL THERAPY | Facility: HOSPITAL | Age: 44
End: 2024-01-19
Payer: COMMERCIAL

## 2024-01-19 NOTE — SIGNIFICANT NOTE
Pt called and canceled today's PT appointment due to inclement weather and rescheduled for 1/24/24.

## 2024-01-22 DIAGNOSIS — M17.0 BILATERAL PRIMARY OSTEOARTHRITIS OF KNEE: ICD-10-CM

## 2024-01-22 RX ORDER — OXYCODONE AND ACETAMINOPHEN 7.5; 325 MG/1; MG/1
1 TABLET ORAL EVERY 8 HOURS PRN
Qty: 30 TABLET | Refills: 0 | Status: SHIPPED | OUTPATIENT
Start: 2024-01-22

## 2024-01-24 ENCOUNTER — HOSPITAL ENCOUNTER (OUTPATIENT)
Dept: PHYSICAL THERAPY | Facility: HOSPITAL | Age: 44
Setting detail: THERAPIES SERIES
Discharge: HOME | End: 2024-01-24
Payer: COMMERCIAL

## 2024-01-24 DIAGNOSIS — Z96.652 STATUS POST LEFT PARTIAL KNEE REPLACEMENT: Primary | ICD-10-CM

## 2024-01-24 PROCEDURE — 97140 MANUAL THERAPY 1/> REGIONS: CPT

## 2024-01-24 PROCEDURE — 97110 THERAPEUTIC EXERCISES: CPT

## 2024-01-24 NOTE — THERAPY TREATMENT NOTE
"  Outpatient Physical Therapy Ortho Treatment Note   Jazz Bullock     Patient Name: Aida Peralta  : 1980  MRN: [id number]  Today's Date: 2024      Visit Date: 2024    Visit Dx:    ICD-10-CM ICD-9-CM   1. Status post left partial knee replacement  Z96.652 V43.65       Patient Active Problem List   Diagnosis    YULIA (generalized anxiety disorder)    History of seizure    Hypertension    Hypothyroidism    Primary osteoarthritis of both knees    Morbid obesity    Plica of knee, left    Adjustment disorder    Status post right partial knee replacement    Status post left partial knee replacement        Past Medical History:   Diagnosis Date    CTS (carpal tunnel syndrome)     GERD (gastroesophageal reflux disease)     Hypertension     Migraines     Plica of knee, left 10/22/2021    Primary osteoarthritis of both knees 10/22/2021    Seizure     one in  , no meds    Sleep apnea     no machine        Past Surgical History:   Procedure Laterality Date    CHOLECYSTECTOMY      KNEE ARTHROPLASTY, PARTIAL REPLACEMENT Right 2023    Procedure: KNEE ARTHROPLASTY, PARTIAL REPLACEMENT;  Surgeon: Siddhartha Pretty MD;  Location: [facility] MAIN OR;  Service: Robotics - Ortho;  Laterality: Right;    TOTAL KNEE ARTHROPLASTY Left 2023    Procedure: KNEE ARTHROPLASTY UNICOMPARTMENTAL;  Surgeon: Siddhartha Pretty MD;  Location: [facility] MAIN OR;  Service: Orthopedics;  Laterality: Left;    TUBAL ABDOMINAL LIGATION          PT Ortho       Row Name 24 1400       Subjective    Subjective Comments \"It's going okay.\" \"My pain today is about a 4; it was really hurting last Friday and over the weekend.\" \"I did paint my bedroom last week and have done my basement steps a couple times, but I didn't use a ladder.\"  -LN       Subjective Pain    Able to rate subjective pain? yes  -LN    Pre-Treatment Pain Level 4  -LN              User Key  (r) = Recorded By, (t) = Taken By, (c) = Cosigned By      Initials Name Provider " "Type    Gloria Wolf, PT Physical Therapist                                 PT Assessment/Plan       Row Name 01/24/24 1500          PT Assessment    Assessment Comments Pt tolerated treatment fairly well but still has decreased tolerance to passive knee flexion stretching and c/o pain and tightness today behind left knee/HS tendons; feel this is due to her increased activity level at home (she has painted her room and began doing her basement steps).  She ambulated with SPC today into PT clinic with minimal antalgic gait noted and did ambulate a little without AD but with moderate antalgic gait on R. Overall decreased knee flexion today due to the sx behind her knee, but continues to have good knee extension.  -LN        PT Plan    PT Frequency 2x/week  -LN     PT Plan Comments Continue to progress left knee ROM and LE strengthening to improve mobility and gait. CP PRN. Add prone hip extension as tolerated.  -LN               User Key  (r) = Recorded By, (t) = Taken By, (c) = Cosigned By      Initials Name Provider Type    Gloria Wolf, PT Physical Therapist                     Modalities       Row Name 01/24/24 1500             Ice    Ice Applied Yes  -LN      Location anterior and posterior L knee with patient supine.  -LN      PT Ice Rx Minutes 10  -LN      Ice S/P Rx Yes  -LN      Patient reports will apply ice at home to involved area Yes  -LN                User Key  (r) = Recorded By, (t) = Taken By, (c) = Cosigned By      Initials Name Provider Type    Gloria Wolf, PT Physical Therapist                   OP Exercises       Row Name 01/24/24 1500 01/24/24 1400          Precautions    Existing Precautions/Restrictions -- no known precautions/restrictions  -LN        Subjective    Subjective Comments -- \"It's going okay.\" \"My pain today is about a 4; it was really hurting last Friday and over the weekend.\"  -LN        Subjective Pain    Able to rate subjective pain? " -- yes  -LN     Pre-Treatment Pain Level -- 4  -LN        Total Minutes    22303 - PT Therapeutic Exercise Minutes -- 45  -LN     75847 - PT Manual Therapy Minutes 15  -LN --        Exercise 1    Exercise Name 1 -- Heelslides with sheet  -LN     Cueing 1 -- Verbal;Tactile;Demo  -LN     Time 1 -- 7 minutes  -LN        Exercise 2    Exercise Name 2 -- Wallslides  -LN     Cueing 2 -- Verbal;Tactile;Demo  -LN     Time 2 -- 7 minutes  -LN        Exercise 3    Exercise Name 3 -- Bike- airdyne seat 2  -LN     Cueing 3 -- Verbal;Tactile;Demo  -LN     Time 3 -- 8 minutes  -LN        Exercise 4    Exercise Name 4 -- supine HS stretch with sheet  -LN     Cueing 4 -- Verbal;Tactile;Demo  -LN     Reps 4 -- 10  -LN     Time 4 -- 10 sec  -LN        Exercise 5    Exercise Name 5 -- QS over single towel roll  -LN     Cueing 5 -- Verbal;Tactile;Demo  -LN     Sets 5 -- [place] STIM (10/10)  -LN     Time 5 -- 10 min  -LN        Exercise 6    Exercise Name 6 -- SAQ  -LN        Exercise 7    Exercise Name 7 -- S/L hip abduction/adduction  -LN     Cueing 7 -- Verbal;Tactile;Demo  -LN     Reps 7 -- 15  -LN        Exercise 8    Exercise Name 8 -- SLR  -LN     Cueing 8 -- Verbal;Tactile;Demo  -LN     Reps 8 -- 15  -LN     Time 8 -- 2 sec  -LN     Additional Comments -- no assist needed  -LN        Exercise 9    Exercise Name 9 -- supine GS  -LN     Reps 9 -- HEP  -LN        Exercise 10    Exercise Name 10 -- Heel prop  -LN     Cueing 10 -- Verbal;Tactile;Demo  -LN     Time 10 -- 5 minutes  -LN        Exercise 11    Exercise Name 11 -- seated march  -LN     Cueing 11 -- Verbal;Tactile;Demo  -LN     Reps 11 -- 15  -LN     Time 11 -- chair  -LN        Exercise 12    Exercise Name 12 -- LAQ  -LN     Cueing 12 -- Verbal;Tactile;Demo  -LN     Reps 12 -- 20  -LN     Time 12 -- chair  -LN        Exercise 13    Exercise Name 13 -- seated ball squeeze  -LN     Cueing 13 -- Verbal;Tactile;Demo  -LN     Reps 13 -- 20  -LN     Time 13 -- 5 sec  -LN         Exercise 14    Exercise Name 14 -- standing calf raises  -LN     Cueing 14 -- Verbal;Tactile;Demo  -LN     Reps 14 -- 20  -LN        Exercise 15    Exercise Name 15 -- standing Hamstring curl  -LN     Cueing 15 -- Verbal;Tactile;Demo  -LN     Reps 15 -- 20  -LN        Exercise 16    Exercise Name 16 -- Forward step ups on 4 inch step  -LN     Cueing 16 -- Verbal;Tactile;Demo  -LN     Reps 16 -- 20 each  -LN               User Key  (r) = Recorded By, (t) = Taken By, (c) = Cosigned By      Initials Name Provider Type    LN Gloria Kan, PT Physical Therapist                             Manual Rx (last 36 hours)       Manual Treatments       Row Name 01/24/24 1500             Total Minutes    33536 - PT Manual Therapy Minutes 15  -LN         Manual Rx 2    Manual Rx 2 Location Seated passive left knee flexion  -LN      Manual Rx 2 Duration 10 x 10 sec  -LN         Manual Rx 3    Manual Rx 3 Location Posterior tibial mobs  -LN      Manual Rx 3 Duration 3 x 5 osc gentle  -LN                User Key  (r) = Recorded By, (t) = Taken By, (c) = Cosigned By      Initials Name Provider Type    LN Gloria Kan, PT Physical Therapist                        Therapy Education  Given: HEP, Symptoms/condition management, Edema management, Mobility training  Program: Reinforced, Progressed  How Provided: Verbal  Provided to: Patient  Level of Understanding: Teach back education performed, Verbalized, Demonstrated              Time Calculation:   Start Time: 1456  Stop Time: 1610  Time Calculation (min): 74 min  Timed Charges  53876 - PT Therapeutic Exercise Minutes: 45  14885 - PT Manual Therapy Minutes: 15  Untimed Charges  PT Ice Rx Minutes: 10  Total Minutes  Timed Charges Total Minutes: 15  Untimed Charges Total Minutes: 10   Total Minutes: 25  Therapy Charges for Today       Code Description Service Date Service Provider Modifiers Qty    25562290731 HC PT THER PROC EA 15 MIN 1/24/2024 Gloria Kan  Jessi, PT GP 3    [id number]  PT MANUAL THERAPY EA 15 MIN 1/24/2024 Gloria Kan, PT GP 1                      Glroia Kan, PT  1/24/2024

## 2024-01-26 ENCOUNTER — HOSPITAL ENCOUNTER (OUTPATIENT)
Dept: PHYSICAL THERAPY | Facility: HOSPITAL | Age: 44
Setting detail: THERAPIES SERIES
Discharge: HOME | End: 2024-01-26
Payer: COMMERCIAL

## 2024-01-26 DIAGNOSIS — Z96.652 STATUS POST LEFT PARTIAL KNEE REPLACEMENT: Primary | ICD-10-CM

## 2024-01-26 PROCEDURE — 97110 THERAPEUTIC EXERCISES: CPT

## 2024-01-26 PROCEDURE — 97140 MANUAL THERAPY 1/> REGIONS: CPT

## 2024-01-26 NOTE — THERAPY RE-EVALUATION
"  Outpatient Physical Therapy Ortho Re-Evaluation   Jazz Bullock     Patient Name: Aida Peralta  : 1980  MRN: [id number]  Today's Date: 2024      Visit Date: 2024    Patient Active Problem List   Diagnosis    YULIA (generalized anxiety disorder)    History of seizure    Hypertension    Hypothyroidism    Primary osteoarthritis of both knees    Morbid obesity    Plica of knee, left    Adjustment disorder    Status post right partial knee replacement    Status post left partial knee replacement        Past Medical History:   Diagnosis Date    CTS (carpal tunnel syndrome)     GERD (gastroesophageal reflux disease)     Hypertension     Migraines     Plica of knee, left 10/22/2021    Primary osteoarthritis of both knees 10/22/2021    Seizure     one in  2018, no meds    Sleep apnea     no machine        Past Surgical History:   Procedure Laterality Date    CHOLECYSTECTOMY      KNEE ARTHROPLASTY, PARTIAL REPLACEMENT Right 2023    Procedure: KNEE ARTHROPLASTY, PARTIAL REPLACEMENT;  Surgeon: Siddhartha Pretty MD;  Location: [facility];  Service: Robotics - Ortho;  Laterality: Right;    TOTAL KNEE ARTHROPLASTY Left 2023    Procedure: KNEE ARTHROPLASTY UNICOMPARTMENTAL;  Surgeon: Siddhartha Pretty MD;  Location: [facility];  Service: Orthopedics;  Laterality: Left;    TUBAL ABDOMINAL LIGATION         Visit Dx:     ICD-10-CM ICD-9-CM   1. Status post left partial knee replacement  Z96.652 V43.65              PT Ortho       Row Name 24 1400       Subjective    Subjective Comments \"It's not too bad; it just feels so stiff.\"  -LN       Subjective Pain    Able to rate subjective pain? yes  -LN    Pre-Treatment Pain Level 3  -LN       Posture/Observations    Observations Edema;Incision healing  -LN    Posture/Observations Comments Pt arrived to PT clinic, ambulating with SPC.  -LN       Knee Palpation    Posterior Joint Line Left:;Tender;Guarded/taut  -LN       Patellar Accessory Motions    " Superior glide Left:;Hypomobile  -LN    Inferior glide Left:;Hypomobile  -LN    Medial glide Left:;WNL  -LN    Lateral glide Left:;WNL  -LN       Left Lower Ext    Lt Knee Extension/Flexion AROM 0 degrees after stretching. 70 degrees active seated knee flexion.  -LN    Lt Knee Extension/Flexion PROM PROM knee flexion in sitting = 77 degrees  -LN       MMT Right Lower Ext    Rt Hip Flexion MMT, Gross Movement (4/5) good  -LN    Rt Hip Extension MMT, Gross Movement (4/5) good  -LN    Rt Hip ABduction MMT, Gross Movement (4/5) good  -LN    Rt Hip ADduction MMT, Gross Movement (4/5) good  -LN    Rt Knee Extension MMT, Gross Movement (4-/5) good minus  -LN    Rt Knee Flexion MMT, Gross Movement (4-/5) good minus  -LN    Rt Ankle Plantarflexion MMT, Gross Movement (5/5) normal  -LN    Rt Ankle Dorsiflexion MMT, Gross Movement (5/5) normal  -LN       Sensation    Light Touch Partial deficits in the LLE  -LN       Lower Extremity Flexibility    Hamstrings Left:;Mildly limited  -LN    Hip Flexors Left:;Mildly limited  -LN    Quadriceps Left:;Moderately limited  -LN    ITB Left:;Mildly limited;Moderately limited  -LN    Gastrocnemius Left:;Mildly limited  -LN    Soleus Left:;Mildly limited  -LN       Transfers    Sit-Stand Isabella (Transfers) independent  -LN    Stand-Sit Isabella (Transfers) independent  -LN    Transfers, Sit-Stand-Sit, Assist Device straight cane  -LN       Gait/Stairs (Locomotion)    Isabella Level (Gait) independent  -LN    Assistive Device (Gait) cane, straight  -LN    Deviations/Abnormal Patterns (Gait) left sided deviations;antalgic;gait speed decreased;stride length decreased  -LN    Left Sided Gait Deviations decreased knee extension;heel strike decreased  -LN    Comment, (Gait/Stairs) Pt now ambulates with SPC, Independently, with slow gait speed (but improved). Pt still tends to keep L knee stiff with decreased knee flexion and decreased knee extension with ambulation. Pt still with  decreased heel to toe gait on L.  -LN              User Key  (r) = Recorded By, (t) = Taken By, (c) = Cosigned By      Initials Name Provider Type    Gloria Wolf, PT Physical Therapist                                Therapy Education  Education Details: Pt to add prone hip extension to HEP as tolerated. She is to continue working on gait with SPC and working on bending her knee more and improved heel to toe gait. Pt to continue with CP as needed. Pt to continue to work on knee flexion and extension at home. Pt reported having some pain with seated hip flexion/march; advised her to try doing in standing at home.   Given: HEP, Symptoms/condition management, Mobility training, Edema management  Program: New, Reinforced (added prone hip extension)  How Provided: Verbal, Demonstration  Provided to: Patient  Level of Understanding: Teach back education performed, Verbalized, Demonstrated        PT OP Goals       Row Name 01/26/24 1400          PT Short Term Goals    STG Date to Achieve 02/09/24  -LN     STG 1 Pt to verbally report decreased left knee/leg pain to <4/10 with ADLs and everyday activities.  -LN     STG 1 Progress Met  -LN     STG 1 Progress Comments Pt rated pain at 3/10 today.  -LN     STG 2 Pt independent with initial HEP issued by therapist.  -LN     STG 2 Progress Met  -LN     STG 3 Left knee ROM improved to 0-90 degrees to allow her to get in and out of car easier.  -LN     STG 3 Progress Partially Met;Ongoing;Progressing  -LN     STG 3 Progress Comments L knee ROM= 0-70 post-stretch; seated passive knee flexion= 77 degrees. Met for knee extension.  -LN     STG 4 Left hip and knee strength improved by 1/2 muscle grade.  -LN     STG 4 Progress Met  -LN     STG 5 Edema left knee decreased by 1-2 cm.  -LN     STG 5 Progress Met  -LN     STG 6 Pt able to ambulate home & short community distances with SPC with only minimal antalgic gait and improved L heel to toe gait.  -LN     STG 6 Progress  Met  -LN     STG 7 Pt able to perform 20 SLR independently and only 5-10 degree Quad lag.  -LN     STG 7 Progress Partially Met;Ongoing;Progressing  -LN     STG 7 Progress Comments She is now able to do 15 reps without any assist and only 5 degree Quad lag.  -LN        Long Term Goals    LTG Date to Achieve 02/23/24  -LN     LTG 1 Pt to verbally report decreased left  knee/leg pain to <2/10 with ADLs and everyday activities.  -LN     LTG 1 Progress Ongoing;Progressing  -LN     LTG 1 Progress Comments Pt rated pain at 3/10 today.  -LN     LTG 2 Pt independent with more advanced HEP issued by therapist.  -LN     LTG 2 Progress Ongoing;Progressing  -LN     LTG 3 Left knee ROM improved to 0-115 degrees.  -LN     LTG 3 Progress Partially Met;Ongoing;Progressing  -LN     LTG 3 Progress Comments L knee ROM= 0-70 post-stretch; seated passive knee flexion= 77 degrees. Met for knee extension.  -LN     LTG 4 Left hip and knee strength improved to 4+-5/5.  -LN     LTG 4 Progress Ongoing;Progressing  -LN     LTG 5 Edema left knee decreased to nominal.  -LN     LTG 5 Progress Ongoing;Progressing  -LN     LTG 5 Progress Comments Minimal persists.  -LN     LTG 6 Pt able to ambulate home & short community distances without any AD with only nominal limp and good heel to toe gait.  -LN     LTG 6 Progress Ongoing;Progressing  -LN     LTG 6 Progress Comments Pt now ambulates with SPC with slow gait speed (but overall improved); minimal antalgic gait on L and decreased heel toe gait noted.  -LN     LTG 7 No Quad lag noted with leg lifts.  -LN     LTG 7 Progress Ongoing;Progressing  -LN     LTG 7 Progress Comments 5 degree Quad lag.  -LN        Time Calculation    PT Goal Re-Cert Due Date 02/23/24  -LN               User Key  (r) = Recorded By, (t) = Taken By, (c) = Cosigned By      Initials Name Provider Type    Gloria Wolf, PT Physical Therapist                     PT Assessment/Plan       Row Name 01/26/24 1400           PT Assessment    Assessment Comments Pt with overall decreased L knee pain but stiffness persists. Pt with good knee extension but knee flexion still limited and progressing slowly- limited by pain and tightness. She still has decreased tolerance to passive knee flexion. Improving strength L hip and knee but still with weakness, jett in L Quads and HS.  Overall improved flexibility L leg. Pt is now ambulating with SPC with slow gait speed (but overall improved) and she still tends to keep L knee stiff with ambulation with decreased knee flexion and extension and still with decreased heel to toe gait on L. She can now do 15 reps SLR without any assist and decreased Quad lag to 5 degrees. She has met 5 out of 7 STG and partially met 2 STG and has partially met 2 out of 7 LTG. She is making fairly good progress towards all remaining goals. She will benefit from continued PT to progress with ROM and strengthening and work on normalizing her gait.  -LN        PT Plan    PT Frequency 2x/week  -LN     Predicted Duration of Therapy Intervention (PT) 4 weeks  -LN     PT Plan Comments Continue per POC. Progress exercises as tolerated. CP PRN. Gait training. Continue towards all remaining goals.  -LN               User Key  (r) = Recorded By, (t) = Taken By, (c) = Cosigned By      Initials Name Provider Type    Gloria Wolf, PT Physical Therapist                     Modalities       Row Name 01/26/24 1400             Ice    Ice Applied Yes  -LN      Location anterior and posterior L knee with patient supine.  -LN      PT Ice Rx Minutes 10  -LN      Ice S/P Rx Yes  -LN      Patient reports will apply ice at home to involved area Yes  -LN                User Key  (r) = Recorded By, (t) = Taken By, (c) = Cosigned By      Initials Name Provider Type    Gloria Wolf, PT Physical Therapist                   OP Exercises       Row Name 01/26/24 1400 01/26/24 1300          Precautions    Existing  "Precautions/Restrictions no known precautions/restrictions  -LN --        Subjective    Subjective Comments \"It's not too bad; it just feels so stiff.\"  -LN --        Subjective Pain    Able to rate subjective pain? yes  -LN --     Pre-Treatment Pain Level 3  -LN --        Total Minutes    22210 - PT Therapeutic Exercise Minutes 45  -LN --     19239 - PT Manual Therapy Minutes -- 18  -LN        Exercise 1    Exercise Name 1 Heelslides with sheet  -LN --     Cueing 1 Verbal;Tactile;Demo  -LN --     Time 1 7 minutes  -LN --        Exercise 2    Exercise Name 2 Wallslides  -LN --     Cueing 2 Verbal;Tactile;Demo  -LN --     Time 2 7 minutes  -LN --        Exercise 3    Exercise Name 3 Bike- airdyne seat 2  -LN --     Cueing 3 Verbal;Tactile;Demo  -LN --     Time 3 8 minutes  -LN --        Exercise 4    Exercise Name 4 supine HS stretch with sheet  -LN --     Cueing 4 Verbal;Tactile;Demo  -LN --     Reps 4 10  -LN --     Time 4 10 sec  -LN --        Exercise 5    Exercise Name 5 QS over single towel roll  -LN --     Cueing 5 Verbal;Tactile;Demo  -LN --     Sets 5 Citizen of [place] STIM (10/10)  -LN --     Time 5 10 min  -LN --        Exercise 6    Exercise Name 6 SAQ  -LN --        Exercise 7    Exercise Name 7 S/L hip abduction/adduction  -LN --     Cueing 7 Verbal;Tactile;Demo  -LN --     Reps 7 15  -LN --        Exercise 8    Exercise Name 8 SLR  -LN --     Cueing 8 Verbal;Tactile;Demo  -LN --     Reps 8 15  -LN --     Time 8 2 sec  -LN --     Additional Comments no assist needed  -LN --        Exercise 9    Exercise Name 9 supine GS  -LN --     Reps 9 HEP  -LN --        Exercise 10    Exercise Name 10 Heel prop  -LN --     Cueing 10 Verbal;Tactile;Demo  -LN --     Time 10 5 minutes  -LN --        Exercise 11    Exercise Name 11 seated march  -LN --     Cueing 11 Verbal;Tactile;Demo  -LN --     Reps 11 15  -LN --     Time 11 chair  -LN --        Exercise 12    Exercise Name 12 LAQ  -LN --     Cueing 12 Verbal;Tactile;Demo  -LN " --     Reps 12 20  -LN --     Time 12 chair  -LN --        Exercise 13    Exercise Name 13 seated ball squeeze  -LN --     Cueing 13 Verbal;Tactile;Demo  -LN --     Reps 13 20  -LN --     Time 13 5 sec  -LN --        Exercise 14    Exercise Name 14 standing calf raises  -LN --     Cueing 14 Verbal;Tactile;Demo  -LN --     Reps 14 20  -LN --        Exercise 15    Exercise Name 15 standing Hamstring curl  -LN --     Cueing 15 Verbal;Tactile;Demo  -LN --     Reps 15 20  -LN --        Exercise 16    Exercise Name 16 Forward step ups on 4 inch step  -LN --     Cueing 16 Verbal;Tactile;Demo  -LN --     Reps 16 20 each  -LN --        Exercise 17    Exercise Name 17 Prone hip extension  -LN --     Cueing 17 Verbal;Tactile;Demo  -LN --     Reps 17 15  -LN --               User Key  (r) = Recorded By, (t) = Taken By, (c) = Cosigned By      Initials Name Provider Type    Gloria Wolf, PT Physical Therapist                  Manual Rx (last 36 hours)       Manual Treatments       Row Name 01/26/24 1300             Total Minutes    56439 - PT Manual Therapy Minutes 18  -LN         Manual Rx 1    Manual Rx 1 Location Patella mobs  -LN      Manual Rx 1 Duration 5-10 each supine  -LN         Manual Rx 2    Manual Rx 2 Location Seated passive left knee flexion  -LN      Manual Rx 2 Duration 10 x 10 sec  -LN         Manual Rx 3    Manual Rx 3 Location Posterior tibial mobs  -LN      Manual Rx 3 Duration 3 x 5 osc gentle  -LN                User Key  (r) = Recorded By, (t) = Taken By, (c) = Cosigned By      Initials Name Provider Type    Glroia Wolf, PT Physical Therapist                                          Time Calculation:     Start Time: 1430  Stop Time: 1550  Time Calculation (min): 80 min  Timed Charges  01913 - PT Therapeutic Exercise Minutes: 45  36550 - PT Manual Therapy Minutes: 18  Untimed Charges  PT Ice Rx Minutes: 10  Total Minutes  Timed Charges Total Minutes: 45  Untimed Charges Total  Minutes: 10   Total Minutes: 55     Therapy Charges for Today       Code Description Service Date Service Provider Modifiers Qty    [id number]  PT THER PROC EA 15 MIN 1/26/2024 Gloria Kan, PT GP 3    [id number]  PT MANUAL THERAPY EA 15 MIN 1/26/2024 Gloria Kan, PT GP 1                      Gloria Kan, PT  1/26/2024

## 2024-01-29 ENCOUNTER — HOSPITAL ENCOUNTER (OUTPATIENT)
Dept: PHYSICAL THERAPY | Facility: HOSPITAL | Age: 44
Setting detail: THERAPIES SERIES
Discharge: HOME | End: 2024-01-29
Payer: COMMERCIAL

## 2024-01-29 DIAGNOSIS — Z96.652 STATUS POST LEFT PARTIAL KNEE REPLACEMENT: Primary | ICD-10-CM

## 2024-01-29 PROCEDURE — 97140 MANUAL THERAPY 1/> REGIONS: CPT

## 2024-01-29 PROCEDURE — 97110 THERAPEUTIC EXERCISES: CPT

## 2024-01-29 NOTE — THERAPY TREATMENT NOTE
"  Outpatient Physical Therapy Ortho Treatment Note   Jazz Bullock     Patient Name: Aida Peralta  : 1980  MRN: [id number]  Today's Date: 2024      Visit Date: 2024    Visit Dx:    ICD-10-CM ICD-9-CM   1. Status post left partial knee replacement  Z96.652 V43.65       Patient Active Problem List   Diagnosis    YULIA (generalized anxiety disorder)    History of seizure    Hypertension    Hypothyroidism    Primary osteoarthritis of both knees    Morbid obesity    Plica of knee, left    Adjustment disorder    Status post right partial knee replacement    Status post left partial knee replacement        Past Medical History:   Diagnosis Date    CTS (carpal tunnel syndrome)     GERD (gastroesophageal reflux disease)     Hypertension     Migraines     Plica of knee, left 10/22/2021    Primary osteoarthritis of both knees 10/22/2021    Seizure     one in  , no meds    Sleep apnea     no machine        Past Surgical History:   Procedure Laterality Date    CHOLECYSTECTOMY      KNEE ARTHROPLASTY, PARTIAL REPLACEMENT Right 2023    Procedure: KNEE ARTHROPLASTY, PARTIAL REPLACEMENT;  Surgeon: Siddhartha Pretty MD;  Location: [facility] OR;  Service: Robotics - Ortho;  Laterality: Right;    TOTAL KNEE ARTHROPLASTY Left 2023    Procedure: KNEE ARTHROPLASTY UNICOMPARTMENTAL;  Surgeon: Siddhartha Pretty MD;  Location: [facility] OR;  Service: Orthopedics;  Laterality: Left;    TUBAL ABDOMINAL LIGATION          PT Ortho       Row Name 24 1300       Subjective    Subjective Comments \"My knee is pretty good today, but those 2 spots (HS tendons) in the back of my knee are still hurting.\" \"Standing marching is much better than sitting.\"  -LN       Subjective Pain    Able to rate subjective pain? yes  -LN    Pre-Treatment Pain Level 2  -LN       Left Lower Ext    Lt Knee Extension/Flexion AROM 0 degrees after stretching.  -LN              User Key  (r) = Recorded By, (t) = Taken By, (c) = Cosigned By " "     Initials Name Provider Type    Gloria Wolf, PT Physical Therapist                                 PT Assessment/Plan       Row Name 01/29/24 1400          PT Assessment    Assessment Comments Pt continues with overall decreased L knee pain, but still c/o pain and tenderness in HS tendons/posterior knee area that does affect her exercise tolerance. Trial of kinesiotape applied today to help decrease tightness in HS tendons/posterior knee.  She still resists passive knee flexion. She is ambulating well with SPC but still tends to keep L leg stiff and guarded with ambulation and decreased heel to toe gait noted. Decreased edema noted.  -LN        PT Plan    PT Frequency 2x/week  -LN     PT Plan Comments Continue to progress left knee ROM and LE strengthening to improve mobility and gait. CP PRN. Try MH to posterior knee prior to exercises next visit.  -LN               User Key  (r) = Recorded By, (t) = Taken By, (c) = Cosigned By      Initials Name Provider Type    Gloria Wolf, PT Physical Therapist                     Modalities       Row Name 01/29/24 1300             Ice    Ice Applied Yes  -LN      Location anterior and posterior L knee with patient supine.  -LN      PT Ice Rx Minutes 10  -LN      Ice S/P Rx Yes  -LN      Patient reports will apply ice at home to involved area Yes  -LN         Other Treatment Provided    Taping / Bracing Trial of kinesiotape applied using 2 \"I\" strips along medial HS tendon and lateral HS tendon (inferior to superior). Instructed pt in use and care of kinesiotape, including safe removal. Pt to leave tape on up to 5 days and to trim/remove tape as needed.  -LN                User Key  (r) = Recorded By, (t) = Taken By, (c) = Cosigned By      Initials Name Provider Type    Gloria Wolf, PT Physical Therapist                   OP Exercises       Row Name 01/29/24 1400 01/29/24 1300          Precautions    Existing " "Precautions/Restrictions -- no known precautions/restrictions  -LN        Subjective    Subjective Comments -- \"My knee is pretty good today, but those 2 spots (HS tendons) in the back of my knee are still hurting.\" \"Standing marching is much better than sitting.\"  -LN        Subjective Pain    Able to rate subjective pain? -- yes  -LN     Pre-Treatment Pain Level -- 2  -LN        Total Minutes    10133 - PT Therapeutic Exercise Minutes 45  -LN --     98718 - PT Manual Therapy Minutes -- 15  -LN        Exercise 1    Exercise Name 1 Heelslides with sheet  -LN --     Cueing 1 Verbal;Tactile;Demo  -LN --     Time 1 7 minutes  -LN --     Additional Comments c/o some pain in HS tendons/posterior knee  -LN --        Exercise 2    Exercise Name 2 Wallslides  -LN --     Cueing 2 Verbal;Tactile;Demo  -LN --     Time 2 7 minutes  -LN --        Exercise 3    Exercise Name 3 Bike- airdyne seat 2  -LN --     Cueing 3 Verbal;Tactile;Demo  -LN --     Time 3 9 minutes  -LN --     Additional Comments rocking back and forth  -LN --        Exercise 4    Exercise Name 4 supine HS stretch with sheet  -LN --     Cueing 4 Verbal;Tactile;Demo  -LN --     Reps 4 10  -LN --     Time 4 10 sec  -LN --        Exercise 5    Exercise Name 5 QS over single towel roll  -LN --     Cueing 5 Verbal;Tactile;Demo  -LN --     Sets 5 Malagasy STIM (10/10)  -LN --     Time 5 10 min  -LN --        Exercise 6    Exercise Name 6 SAQ  -LN --        Exercise 7    Exercise Name 7 S/L hip abduction  -LN --     Cueing 7 Verbal;Tactile;Demo  -LN --     Reps 7 15  -LN --        Exercise 8    Exercise Name 8 SLR  -LN --     Cueing 8 Verbal;Tactile;Demo  -LN --     Reps 8 15  -LN --     Time 8 2 sec  -LN --        Exercise 9    Exercise Name 9 supine GS  -LN --     Reps 9 HEP  -LN --        Exercise 10    Exercise Name 10 Heel prop  -LN --     Cueing 10 Verbal;Tactile;Demo  -LN --     Time 10 5 minutes  -LN --        Exercise 11    Exercise Name 11 standing march  -LN " --     Cueing 11 Verbal;Tactile;Demo  -LN --     Reps 11 20  -LN --     Time 11 --  -LN --        Exercise 12    Exercise Name 12 LAQ  -LN --     Cueing 12 Verbal;Tactile;Demo  -LN --     Reps 12 15 -LN --     Time 12  --        Exercise 13    Exercise Name 13 seated ball squeeze  -LN --     Cueing 13 Verbal;Tactile;Demo  -LN --     Reps 13 20  -LN --     Time 13 5 sec  -LN --        Exercise 14    Exercise Name 14 standing calf raises  -LN --     Cueing 14 Verbal;Tactile;Demo  -LN --     Reps 14 20  -LN --        Exercise 15    Exercise Name 15 standing Hamstring curl  -LN --     Cueing 15 Verbal;Tactile;Demo  -LN --     Reps 15 10  -LN --     Additional Comments c/o HS pain  -LN --        Exercise 16    Exercise Name 16 Forward step ups on 4 inch step  -LN --     Cueing 16 Verbal;Tactile;Demo  -LN --     Reps 16 20 each  -LN --        Exercise 17    Exercise Name 17 Prone hip extension  -LN --     Cueing 17 Verbal;Tactile;Demo  -LN --     Reps 17 15  -LN --        Exercise 18    Exercise Name 18 S/L hip adduction  -LN --     Cueing 18 Verbal;Tactile;Demo  -LN --     Reps 18 15  -LN --               User Key  (r) = Recorded By, (t) = Taken By, (c) = Cosigned By      Initials Name Provider Type    Gloria Wolf, PT Physical Therapist                             Manual Rx (last 36 hours)       Manual Treatments       Row Name 01/29/24 1300             Total Minutes    35243 - PT Manual Therapy Minutes 15  -LN         Manual Rx 2    Manual Rx 2 Location Seated passive left knee flexion  -LN      Manual Rx 2 Duration 10 x 10 sec  -LN         Manual Rx 3    Manual Rx 3 Location Posterior tibial mobs  -LN      Manual Rx 3 Duration 3 x 5 osc gentle  -LN                User Key  (r) = Recorded By, (t) = Taken By, (c) = Cosigned By      Initials Name Provider Type    Gloria Wolf, PT Physical Therapist                        Therapy Education  Education Details: Pt to add  S/L hip adduction to HEP  (can take the place of seated ball squeeze). Pt to use CP PRN and to begin putting MH on back of knee/HS tendons to help decrease the tightness, but to stop if she notices any increased knee swelling. Pt to continue working on improved knee flexion and improved heel to toe gait with walking. Education on use and care of kinesiotape, including safe removal.  Given: HEP, Symptoms/condition management, Pain management, Mobility training, Edema management  Program: New, Reinforced (added S/L hip adduction)  How Provided: Verbal, Demonstration  Provided to: Patient  Level of Understanding: Teach back education performed, Verbalized, Demonstrated              Time Calculation:   Start Time: 1400  Timed Charges  94660 - PT Therapeutic Exercise Minutes: 45  73706 - PT Manual Therapy Minutes: 15  Untimed Charges  PT Ice Rx Minutes: 10  Total Minutes  Timed Charges Total Minutes: 45  Untimed Charges Total Minutes: 10   Total Minutes: 45  Therapy Charges for Today       Code Description Service Date Service Provider Modifiers Qty    76044915779 HC PT THER PROC EA 15 MIN 1/29/2024 Gloria Kan, PT GP 3    [id number] HC PT MANUAL THERAPY EA 15 MIN 1/29/2024 Gloria Kan, PT GP 1                      Gloria Kan, PT  1/29/2024

## 2024-01-31 ENCOUNTER — HOSPITAL ENCOUNTER (OUTPATIENT)
Dept: PHYSICAL THERAPY | Facility: HOSPITAL | Age: 44
Setting detail: THERAPIES SERIES
Discharge: HOME | End: 2024-01-31
Payer: COMMERCIAL

## 2024-01-31 DIAGNOSIS — Z96.652 STATUS POST LEFT PARTIAL KNEE REPLACEMENT: Primary | ICD-10-CM

## 2024-01-31 PROCEDURE — 97140 MANUAL THERAPY 1/> REGIONS: CPT

## 2024-01-31 PROCEDURE — 97110 THERAPEUTIC EXERCISES: CPT

## 2024-01-31 NOTE — THERAPY TREATMENT NOTE
"  Outpatient Physical Therapy Ortho Treatment Note   Jazz Bullock     Patient Name: Aida Peralta  : 1980  MRN: [id number]  Today's Date: 2024      Visit Date: 2024    Visit Dx:    ICD-10-CM ICD-9-CM   1. Status post left partial knee replacement  Z96.652 V43.65       Patient Active Problem List   Diagnosis    YULIA (generalized anxiety disorder)    History of seizure    Hypertension    Hypothyroidism    Primary osteoarthritis of both knees    Morbid obesity    Plica of knee, left    Adjustment disorder    Status post right partial knee replacement    Status post left partial knee replacement        Past Medical History:   Diagnosis Date    CTS (carpal tunnel syndrome)     GERD (gastroesophageal reflux disease)     Hypertension     Migraines     Plica of knee, left 10/22/2021    Primary osteoarthritis of both knees 10/22/2021    Seizure     one in  , no meds    Sleep apnea     no machine        Past Surgical History:   Procedure Laterality Date    CHOLECYSTECTOMY      KNEE ARTHROPLASTY, PARTIAL REPLACEMENT Right 2023    Procedure: KNEE ARTHROPLASTY, PARTIAL REPLACEMENT;  Surgeon: Siddhartha Pretty MD;  Location: [facility] MAIN OR;  Service: Robotics - Ortho;  Laterality: Right;    TOTAL KNEE ARTHROPLASTY Left 2023    Procedure: KNEE ARTHROPLASTY UNICOMPARTMENTAL;  Surgeon: Siddhartha Pretty MD;  Location: [facility] MAIN OR;  Service: Orthopedics;  Laterality: Left;    TUBAL ABDOMINAL LIGATION          PT Ortho       Row Name 24 1400       Left Lower Ext    Lt Knee Extension/Flexion PROM PROM knee flexion in sitting = 70 degrees  -LN      Row Name 24 1300       Subjective    Subjective Comments \"My knee is doing pretty good.\" \"I forgot my cane; it is in my kitchen.\" She reports still having pain and tightness in back of knee but the tape has helped some.  -LN       Subjective Pain    Able to rate subjective pain? yes  -LN    Pre-Treatment Pain Level 2  2-3/10  -LN              User " Key  (r) = Recorded By, (t) = Taken By, (c) = Cosigned By      Initials Name Provider Type    Gloria Wolf, PT Physical Therapist                                 PT Assessment/Plan       Row Name 01/31/24 1400          PT Assessment    Assessment Comments Pt with overall decreased c/o L knee pain but still having pain and tightness in posterior knee and HS tendons. Pt tolerated treatment fairly well, but still has limited knee flexion; actually decreased today and pt continues to have decreased tolerance to passive knee flexion stretching and tends to tense up with it. She ambulated into PT clinic today without any AD and minimal antalgic gait on L noted; increased to moderate after exercises/passive knee stretching. She does report some benefit from kinesiotape.  -LN        PT Plan    PT Frequency 2x/week  -LN     PT Plan Comments Continue to progress left knee ROM and LE strengthening to improve mobility and gait. CP PRN. MH to posterior knee prior to exercises as needed.  -LN               User Key  (r) = Recorded By, (t) = Taken By, (c) = Cosigned By      Initials Name Provider Type    Gloria Wolf, PT Physical Therapist                     Modalities       Row Name 01/31/24 1300             Moist Heat    MH Applied Yes  -LN      Location Posterior L knee/LE with pt supine.  -LN      PT Moist Heat Minutes 10  -LN      MH Prior to Rx Yes  -LN         Ice    Ice Applied Yes  -LN      Location anterior and posterior L knee with patient supine.  -LN      PT Ice Rx Minutes 10  -LN      Ice S/P Rx Yes  -LN      Patient reports will apply ice at home to involved area Yes  -LN         Other Treatment Provided    Taping / Bracing Kinesiotape still intact, so left on. Pt has trimmed the tape some. Pt to remove tape on Saturday if still on.  -LN                User Key  (r) = Recorded By, (t) = Taken By, (c) = Cosigned By      Initials Name Provider Type    Gloria Wolf, PT  "Physical Therapist                   OP Exercises       Row Name 01/31/24 1300             Precautions    Existing Precautions/Restrictions no known precautions/restrictions  -LN         Subjective    Subjective Comments \"My knee is doing pretty good.\" \"I forgot my cane; it is in my kitchen.\" She reports still having pain and tightness in back of knee but the tape has helped some.  -LN         Subjective Pain    Able to rate subjective pain? yes  -LN      Pre-Treatment Pain Level 2  2-3/10  -LN         Total Minutes    42017 - PT Therapeutic Exercise Minutes 48  -LN      15071 - PT Manual Therapy Minutes 15  -LN         Exercise 1    Exercise Name 1 Heelslides with sheet  -LN      Cueing 1 Verbal;Tactile;Demo  -LN      Time 1 7 minutes  -LN         Exercise 2    Exercise Name 2 Wallslides  -LN      Cueing 2 Verbal;Tactile;Demo  -LN      Time 2 7 minutes  -LN         Exercise 3    Exercise Name 3 Bike- airdyne seat 2  -LN      Cueing 3 Verbal;Tactile;Demo  -LN      Time 3 9 minutes  -LN      Additional Comments rocking back and forth  -LN         Exercise 4    Exercise Name 4 supine HS stretch with sheet/supine HS stretch with adduction  -LN      Cueing 4 Verbal;Tactile;Demo  -LN      Reps 4 10/10  -LN      Time 4 10 sec  -LN         Exercise 5    Exercise Name 5 QS over single towel roll  -LN      Cueing 5 Verbal;Tactile;Demo  -LN      Sets 5 Greek STIM (10/10)  -LN      Time 5 10 min  -LN         Exercise 6    Exercise Name 6 SAQ  -LN         Exercise 7    Exercise Name 7 S/L hip abduction  -LN      Cueing 7 Verbal;Tactile;Demo  -LN      Reps 7 15  -LN         Exercise 8    Exercise Name 8 SLR  -LN      Cueing 8 Verbal;Tactile;Demo  -LN      Reps 8 15  -LN      Time 8 2 sec  -LN         Exercise 9    Exercise Name 9 supine GS  -LN      Reps 9 HEP  -LN         Exercise 10    Exercise Name 10 Heel prop  -LN      Cueing 10 Verbal;Tactile;Demo  -LN      Time 10 5 minutes  -LN         Exercise 11    Exercise Name 11 " standing march  -LN      Cueing 11 Verbal;Tactile;Demo  -LN      Reps 11 20  -LN         Exercise 12    Exercise Name 12 LAQ  -LN      Cueing 12 Verbal;Tactile;Demo  -LN      Reps 12 15  -LN      Time 12 chair  -LN         Exercise 13    Exercise Name 13 seated ball squeeze  -LN      Cueing 13 Verbal;Tactile;Demo  -LN      Reps 13 20  -LN      Time 13 5 sec  -LN         Exercise 14    Exercise Name 14 standing calf raises  -LN      Cueing 14 Verbal;Tactile;Demo  -LN      Reps 14 20  -LN         Exercise 15    Exercise Name 15 standing Hamstring curl  -LN      Cueing 15 Verbal;Tactile;Demo  -LN      Reps 15 10  -LN      Additional Comments c/o HS pain  -LN         Exercise 16    Exercise Name 16 Forward step ups on 4 inch step  -LN      Cueing 16 Verbal;Tactile;Demo  -LN      Reps 16 20 each  -LN         Exercise 17    Exercise Name 17 Prone hip extension  -LN      Cueing 17 Verbal;Tactile;Demo  -LN      Reps 17 15  -LN         Exercise 18    Exercise Name 18 S/L hip adduction  -LN      Cueing 18 Verbal;Tactile;Demo  -LN      Reps 18 15  -LN         Exercise 19    Exercise Name 19 TKE vs TB  -LN      Cueing 19 Verbal;Tactile;Demo  -LN      Reps 19 15  -LN      Additional Comments Blue TB  -LN                User Key  (r) = Recorded By, (t) = Taken By, (c) = Cosigned By      Initials Name Provider Type    LN Gloria Kan, PT Physical Therapist                             Manual Rx (last 36 hours)       Manual Treatments       Row Name 01/31/24 1300             Total Minutes    75276 - PT Manual Therapy Minutes 15  -LN         Manual Rx 2    Manual Rx 2 Location Seated passive left knee flexion  -LN      Manual Rx 2 Duration 10 x 10 sec  -LN         Manual Rx 3    Manual Rx 3 Location Posterior tibial mobs  -LN      Manual Rx 3 Duration 3 x 5 osc gentle  -LN                User Key  (r) = Recorded By, (t) = Taken By, (c) = Cosigned By      Initials Name Provider Type    Gloria Wolf, PT  Physical Therapist                        Therapy Education  Education Details: Pt to add supine hamstring stretch with adduction with sheet (ITB) and TKE vs TB to HEP as tolerated. Issued blue TB for home. Pt to use CP/MH at home as needed.  Given: HEP, Symptoms/condition management, Pain management, Edema management, Mobility training  Program: New, Reinforced (added supine hamstring stretch with adduction with sheet (ITB) and TKE vs TB)  How Provided: Verbal, Demonstration, Written  Provided to: Patient  Level of Understanding: Teach back education performed, Verbalized, Demonstrated              Time Calculation:   Start Time: 1358  Stop Time: 1530  Time Calculation (min): 92 min  Timed Charges  38484 - PT Therapeutic Exercise Minutes: 48  24668 - PT Manual Therapy Minutes: 15  Untimed Charges  PT Moist Heat Minutes: 10  PT Ice Rx Minutes: 10  Total Minutes  Timed Charges Total Minutes: 63  Untimed Charges Total Minutes: 20   Total Minutes: 83  Therapy Charges for Today       Code Description Service Date Service Provider Modifiers Qty    56934466945 HC PT THER PROC EA 15 MIN 1/31/2024 Gloria Kan, PT GP 3    70329039807 HC PT MANUAL THERAPY EA 15 MIN 1/31/2024 Gloria Kan, PT GP 1                      Gloria Kan, PT  1/31/2024

## 2024-02-02 DIAGNOSIS — M17.0 BILATERAL PRIMARY OSTEOARTHRITIS OF KNEE: ICD-10-CM

## 2024-02-06 ENCOUNTER — HOSPITAL ENCOUNTER (OUTPATIENT)
Dept: PHYSICAL THERAPY | Facility: HOSPITAL | Age: 44
Setting detail: THERAPIES SERIES
Discharge: HOME | End: 2024-02-06
Payer: COMMERCIAL

## 2024-02-06 DIAGNOSIS — Z96.652 STATUS POST LEFT PARTIAL KNEE REPLACEMENT: Primary | ICD-10-CM

## 2024-02-06 PROCEDURE — 97110 THERAPEUTIC EXERCISES: CPT

## 2024-02-06 PROCEDURE — 97140 MANUAL THERAPY 1/> REGIONS: CPT

## 2024-02-06 RX ORDER — OXYCODONE AND ACETAMINOPHEN 7.5; 325 MG/1; MG/1
1 TABLET ORAL EVERY 8 HOURS PRN
Qty: 30 TABLET | Refills: 0 | Status: SHIPPED | OUTPATIENT
Start: 2024-02-06

## 2024-02-06 NOTE — THERAPY TREATMENT NOTE
Outpatient Physical Therapy Ortho Treatment Note   Jazz Bullock     Patient Name: Aida Peralta  : 1980  MRN: [id number]  Today's Date: 2024      Visit Date: 2024    Visit Dx:    ICD-10-CM ICD-9-CM   1. Status post left partial knee replacement  Z96.652 V43.65       Patient Active Problem List   Diagnosis    YULIA (generalized anxiety disorder)    History of seizure    Hypertension    Hypothyroidism    Primary osteoarthritis of both knees    Morbid obesity    Plica of knee, left    Adjustment disorder    Status post right partial knee replacement    Status post left partial knee replacement        Past Medical History:   Diagnosis Date    CTS (carpal tunnel syndrome)     GERD (gastroesophageal reflux disease)     Hypertension     Migraines     Plica of knee, left 10/22/2021    Primary osteoarthritis of both knees 10/22/2021    Seizure     one in  , no meds    Sleep apnea     no machine        Past Surgical History:   Procedure Laterality Date    CHOLECYSTECTOMY      KNEE ARTHROPLASTY, PARTIAL REPLACEMENT Right 2023    Procedure: KNEE ARTHROPLASTY, PARTIAL REPLACEMENT;  Surgeon: Siddhartha Pretty MD;  Location: [facility];  Service: Robotics - Ortho;  Laterality: Right;    TOTAL KNEE ARTHROPLASTY Left 2023    Procedure: KNEE ARTHROPLASTY UNICOMPARTMENTAL;  Surgeon: Siddhartha Pretty MD;  Location: [facility] MAIN OR;  Service: Orthopedics;  Laterality: Left;    TUBAL ABDOMINAL LIGATION          PT Ortho       Row Name 24 1400       Subjective    Subjective Comments pt reports having a terrible night last night due to hamstring tightness and pain - pt says it's hard to explain how it feels but needs some kind of release  -              User Key  (r) = Recorded By, (t) = Taken By, (c) = Cosigned By      Initials Name Provider Type    Edi Moncada, PTA Physical Therapist Assistant                                 PT Assessment/Plan       Row Name 24 7559          PT  "Assessment    Assessment Comments pt continues with reports of minimal (L) knee pain but significant pain in the hamstrings and then during PROM, pt reporting pain along her shin and into her foot; pt still with very limited tolerance to ROM  -        PT Plan    PT Plan Comments Cont per POC  -               User Key  (r) = Recorded By, (t) = Taken By, (c) = Cosigned By      Initials Name Provider Type     Edi Winchester PTA Physical Therapist Assistant                     Modalities       Row Name 02/06/24 1400             Precautions    Existing Precautions/Restrictions no known precautions/restrictions  -         Moist Heat    Location Posterior L knee/LE with pt supine.  -      PT Moist Heat Minutes 10  -      MH Prior to Rx Yes  -         Ice    Location anterior and posterior L knee with patient supine.  -      PT Ice Rx Minutes 10  -      Ice S/P Rx Yes  -      Patient reports will apply ice at home to involved area Yes  -         Other Treatment Provided    Taping / Bracing Kinesiotape applied using 2 \"I\" strips along medial HS tendon and lateral HS tendon (inferior to superior). Instructed pt in use and care of kinesiotape, including safe removal. Pt to leave tape on up to 5 days and to trim/remove tape as needed.  -                User Key  (r) = Recorded By, (t) = Taken By, (c) = Cosigned By      Initials Name Provider Type     Edi Winchester PTA Physical Therapist Assistant                   OP Exercises       Row Name 02/06/24 1557 02/06/24 1400          Precautions    Existing Precautions/Restrictions -- no known precautions/restrictions  -        Subjective    Subjective Comments -- pt reports having a terrible night last night due to hamstring tightness and pain - pt says it's hard to explain how it feels but needs some kind of release  -        Total Minutes    71931 - PT Therapeutic Exercise Minutes 50  - --     92315 - PT Manual Therapy Minutes 15  -MH --        " Exercise 1    Exercise Name 1 -- Heelslides with sheet  -     Cueing 1 -- Verbal;Tactile;Demo  -     Time 1 -- 7 minutes  -        Exercise 2    Exercise Name 2 -- Wallslides  -     Cueing 2 -- Verbal;Tactile;Demo  -     Time 2 -- 7 minutes  -        Exercise 3    Exercise Name 3 -- Bike- airdyne seat 2  -     Cueing 3 -- Verbal;Tactile;Demo  -     Time 3 -- 9 minutes  -     Additional Comments -- rocking back and forth  -        Exercise 4    Exercise Name 4 -- supine HS stretch with sheet/supine HS stretch with adduction  -     Cueing 4 -- Verbal;Tactile;Demo  -     Reps 4 -- 10/10  -     Time 4 -- 10 sec  -        Exercise 5    Exercise Name 5 -- QS over single towel roll  -     Cueing 5 -- Verbal;Tactile;Demo  -     Sets 5 -- [place] STIM (10/10)  -     Time 5 -- 10 min  -        Exercise 6    Exercise Name 6 -- SAQ  -        Exercise 7    Exercise Name 7 -- S/L hip abduction  -     Cueing 7 -- Verbal;Tactile;Demo  -     Reps 7 -- 15  -        Exercise 8    Exercise Name 8 -- SLR  -     Cueing 8 -- Verbal;Tactile;Demo  -     Reps 8 -- 15  -     Time 8 -- 2 sec  -        Exercise 9    Exercise Name 9 -- supine GS  -     Reps 9 -- HEP  -        Exercise 10    Exercise Name 10 -- Heel prop  -     Cueing 10 -- Verbal;Tactile;Demo  -     Time 10 -- 5 minutes  -        Exercise 11    Exercise Name 11 -- standing march  -     Cueing 11 -- Verbal;Tactile;Demo  -     Reps 11 -- 20  -        Exercise 12    Exercise Name 12 -- LAQ  -     Cueing 12 -- Verbal;Tactile;Demo  -     Reps 12 -- 15  -     Time 12 -- chair  -        Exercise 13    Exercise Name 13 -- seated ball squeeze  -     Cueing 13 -- Verbal;Tactile;Demo  -     Reps 13 -- 20  -     Time 13 -- 5 sec  -        Exercise 14    Exercise Name 14 -- standing calf raises  -     Cueing 14 -- Verbal;Tactile;Demo  -     Reps 14 -- 20  -        Exercise 15    Exercise Name 15 --  standing Hamstring curl  -     Cueing 15 -- Verbal;Tactile;Demo  -     Reps 15 -- 8  -MH        Exercise 16    Exercise Name 16 -- Forward step ups on 4 inch step  -     Cueing 16 -- Verbal;Tactile;Demo  -     Reps 16 -- 20 each  -        Exercise 17    Exercise Name 17 -- Prone hip extension  -MH     Cueing 17 -- Verbal;Tactile;Demo  -     Reps 17 -- 15  -MH        Exercise 18    Exercise Name 18 -- S/L hip adduction  -     Cueing 18 -- Verbal;Tactile;Demo  -     Reps 18 -- 15  -MH        Exercise 19    Exercise Name 19 -- TKE vs TB  -     Cueing 19 -- Verbal;Tactile;Demo  -     Reps 19 -- 15  -MH     Time 19 -- blue  -               User Key  (r) = Recorded By, (t) = Taken By, (c) = Cosigned By      Initials Name Provider Type     Edi Winchester PTA Physical Therapist Assistant                             Manual Rx (last 36 hours)       Manual Treatments       Row Name 02/06/24 1557 02/06/24 1400          Total Minutes    58717 - PT Manual Therapy Minutes 15  -MH --        Manual Rx 2    Manual Rx 2 Location -- Seated passive left knee flexion  -     Manual Rx 2 Duration -- 10 x 10 sec  -        Manual Rx 3    Manual Rx 3 Location -- Posterior tibial mobs  -     Manual Rx 3 Duration -- 3 x 5 osc gentle  -               User Key  (r) = Recorded By, (t) = Taken By, (c) = Cosigned By      Initials Name Provider Type     Edi Winchester PTA Physical Therapist Assistant                        Therapy Education  Given: HEP, Symptoms/condition management  Program: Reinforced  How Provided: Verbal, Demonstration  Provided to: Patient  Level of Understanding: Teach back education performed, Verbalized, Demonstrated              Time Calculation:   Start Time: 1400  Stop Time: 1557  Time Calculation (min): 117 min  Timed Charges  13166 - PT Therapeutic Exercise Minutes: 50  59031 - PT Manual Therapy Minutes: 15  Untimed Charges  PT Moist Heat Minutes: 10  PT Ice Rx Minutes: 10  Total  Minutes  Timed Charges Total Minutes: 65  Untimed Charges Total Minutes: 20   Total Minutes: 65  Therapy Charges for Today       Code Description Service Date Service Provider Modifiers Qty    20228395578 HC PT THER PROC EA 15 MIN 2/6/2024 Edi Winchester PTA GP 3    66887894945 HC PT MANUAL THERAPY EA 15 MIN 2/6/2024 Edi Winchester PTA GP 1                      Edi Winchester PTA  2/6/2024

## 2024-02-08 ENCOUNTER — HOSPITAL ENCOUNTER (OUTPATIENT)
Dept: PHYSICAL THERAPY | Facility: HOSPITAL | Age: 44
Setting detail: THERAPIES SERIES
Discharge: HOME | End: 2024-02-08
Payer: COMMERCIAL

## 2024-02-08 DIAGNOSIS — Z96.652 STATUS POST LEFT PARTIAL KNEE REPLACEMENT: Primary | ICD-10-CM

## 2024-02-08 PROCEDURE — 97140 MANUAL THERAPY 1/> REGIONS: CPT

## 2024-02-08 PROCEDURE — 97110 THERAPEUTIC EXERCISES: CPT

## 2024-02-08 NOTE — THERAPY TREATMENT NOTE
"  Outpatient Physical Therapy Ortho Treatment Note   Jazz Bullock     Patient Name: Aida Peralta  : 1980  MRN: [id number]  Today's Date: 2024      Visit Date: 2024    Visit Dx:    ICD-10-CM ICD-9-CM   1. Status post left partial knee replacement  Z96.652 V43.65       Patient Active Problem List   Diagnosis    YULIA (generalized anxiety disorder)    History of seizure    Hypertension    Hypothyroidism    Primary osteoarthritis of both knees    Morbid obesity    Plica of knee, left    Adjustment disorder    Status post right partial knee replacement    Status post left partial knee replacement        Past Medical History:   Diagnosis Date    CTS (carpal tunnel syndrome)     GERD (gastroesophageal reflux disease)     Hypertension     Migraines     Plica of knee, left 10/22/2021    Primary osteoarthritis of both knees 10/22/2021    Seizure     one in  , no meds    Sleep apnea     no machine        Past Surgical History:   Procedure Laterality Date    CHOLECYSTECTOMY      KNEE ARTHROPLASTY, PARTIAL REPLACEMENT Right 2023    Procedure: KNEE ARTHROPLASTY, PARTIAL REPLACEMENT;  Surgeon: Siddhartha Pretty MD;  Location: [facility] OR;  Service: Robotics - Ortho;  Laterality: Right;    TOTAL KNEE ARTHROPLASTY Left 2023    Procedure: KNEE ARTHROPLASTY UNICOMPARTMENTAL;  Surgeon: Siddhartha Pretty MD;  Location: [facility] OR;  Service: Orthopedics;  Laterality: Left;    TUBAL ABDOMINAL LIGATION          PT Ortho       Row Name 24 1400       Subjective    Subjective Comments Pt reports the back of her leg and HS are still really painful and last night she was in bed and thinks she went to roll over and had the \"worst pain I have ever felt.\" in her HS area- she reports it lasted about 20 minutes. She did report that it is still sore but doesn't seem to be as tight right now.  Pt gave verbal and written consent for dry needling.  -LN       Subjective Pain    Able to rate subjective pain? yes  " -LN    Pre-Treatment Pain Level 2  -LN    Subjective Pain Comment but was 10+/10 last night when she rolled over.  -LN              User Key  (r) = Recorded By, (t) = Taken By, (c) = Cosigned By      Initials Name Provider Type    Gloria Wolf, PT Physical Therapist                                 PT Assessment/Plan       Row Name 02/08/24 1500          PT Assessment    Assessment Comments Pt continues with c/o pain and tightness in L posterior knee and HS muscle and had an episode of significant pain last night, which sounds like it may have been a muscle spasm. Trial of dry needling done today to posterior knee/HS/calf and she tolerated low dosage very well & did report some benefit from it. She still has decreased tolerance to passive flexion stretching of L knee, but reported pain was in her knee today with stretching and not in her HS which it usually is. Pt ambulating well with SPC but still with min to moderate antalgic gait and she still tends to keep L knee stiff with ambulation. Did hold closed chain exercises today since getting a dry needling session, but pt she can resume them tomorrow as tolerated.  -LN        PT Plan    PT Frequency 2x/week  -LN     Predicted Duration of Therapy Intervention (PT) DN 1 x week as indicated.  -LN     PT Plan Comments Cont per POC; assess benefit of dry needling. Pt to resume her standing exercises tomorrow as tolerated.  -LN               User Key  (r) = Recorded By, (t) = Taken By, (c) = Cosigned By      Initials Name Provider Type    Gloria Wolf, PT Physical Therapist                     Modalities       Row Name 02/08/24 1400             Moist Heat    MH Applied Yes  -LN      Location Posterior L knee/LE with pt supine.  -LN      PT Moist Heat Minutes 10  -LN      MH Prior to Rx Yes  -LN         Ice    Ice Applied --  -LN      Location --  -LN      PT Ice Rx Minutes --  -LN      Ice S/P Rx --  -LN      Patient reports will apply ice at  "home to involved area Yes  Pt to use ice later today as needed.  -LN         Other Treatment Provided    Taping / Bracing Removed kinesiotape today (pt did not feel like it was helping her much) and did not reapply.  -LN                User Key  (r) = Recorded By, (t) = Taken By, (c) = Cosigned By      Initials Name Provider Type    LN Gloria Kan, PT Physical Therapist                   OP Exercises       Row Name 02/08/24 1400 02/08/24 1300          Precautions    Existing Precautions/Restrictions no known precautions/restrictions  -LN --        Subjective    Subjective Comments Pt reports the back of her leg and HS are still really painful and last night she was in bed and thinks she went to roll over and had the \"worst pain I have ever felt.\" in her HS area- she reports it lasted about 20 minutes. She did report that it is still sore but doesn't seem to be as tight right now.  Pt gave verbal and written consent for dry needling.  -LN --        Subjective Pain    Able to rate subjective pain? yes  -LN --     Pre-Treatment Pain Level 2  -LN --     Subjective Pain Comment but was 10+/10 last night when she rolled over.  -LN --        Total Minutes    03509 - PT Therapeutic Exercise Minutes 50  -LN --     80870 - PT Manual Therapy Minutes -- 15  -LN        Exercise 1    Exercise Name 1 Heelslides with sheet  -LN --     Cueing 1 Verbal;Tactile;Demo  -LN --     Time 1 7 minutes  -LN --        Exercise 2    Exercise Name 2 Wallslides  -LN --     Cueing 2 Verbal;Tactile;Demo  -LN --     Time 2 7 minutes  -LN --        Exercise 3    Exercise Name 3 Bike- airdyne seat 2  -LN --     Cueing 3 Verbal;Tactile;Demo  -LN --     Time 3 9 minutes  -LN --     Additional Comments rocking back and forth  -LN --        Exercise 4    Exercise Name 4 supine HS stretch with sheet/supine HS stretch with adduction  -LN --     Cueing 4 Verbal;Tactile;Demo  -LN --     Reps 4 10/10  -LN --     Time 4 10 sec  -LN --        " Exercise 5    Exercise Name 5 QS over single towel roll  -LN --     Cueing 5 Verbal;Tactile;Demo  -LN --     Sets 5 [place] STIM (10/10)  -LN --     Time 5 10 min  -LN --        Exercise 6    Exercise Name 6 SAQ  -LN --        Exercise 7    Exercise Name 7 S/L hip abduction  -LN --     Cueing 7 Verbal;Tactile;Demo  -LN --     Reps 7 15  -LN --        Exercise 8    Exercise Name 8 SLR  -LN --     Cueing 8 Verbal;Tactile;Demo  -LN --     Reps 8 15  -LN --     Time 8 2 sec  -LN --        Exercise 9    Exercise Name 9 supine GS  -LN --     Reps 9 HEP  -LN --        Exercise 10    Exercise Name 10 Heel prop  -LN --     Cueing 10 Verbal;Tactile;Demo  -LN --     Time 10 5 minutes  -LN --        Exercise 11    Exercise Name 11 standing march  -LN --     Cueing 11 --  -LN --     Reps 11 --  -LN --        Exercise 12    Exercise Name 12 LAQ  -LN --     Cueing 12 Verbal;Tactile;Demo  -LN --     Reps 12 15  -LN --     Time 12 --  -LN --        Exercise 13    Exercise Name 13 --  -LN --     Cueing 13 --  -LN --     Reps 13 --  -LN --     Time 13 --  -LN --        Exercise 14    Exercise Name 14 standing calf raises  -LN --     Cueing 14 --  -LN --     Reps 14 --  -LN --        Exercise 15    Exercise Name 15 standing Hamstring curl  -LN --     Cueing 15 --  -LN --     Reps 15 --  -LN --        Exercise 16    Exercise Name 16 Forward step ups on 4 inch step  -LN --     Cueing 16 --  -LN --     Reps 16 --  -LN --        Exercise 17    Exercise Name 17 Prone hip extension  -LN --     Cueing 17 Verbal;Tactile;Demo  -LN --     Reps 17 15  -LN --        Exercise 18    Exercise Name 18 S/L hip adduction  -LN --     Cueing 18 Verbal;Tactile;Demo  -LN --     Reps 18 15  -LN --        Exercise 19    Exercise Name 19 TKE vs TB  -LN --     Cueing 19 --  -LN --     Reps 19 --  -LN --     Time 19 --  -LN --               User Key  (r) = Recorded By, (t) = Taken By, (c) = Cosigned By      Initials Name Provider Type    Gloria Wolf  Jessi, PT Physical Therapist                             Manual Rx (last 36 hours)       Manual Treatments       Row Name 02/08/24 1300             Total Minutes    35766 - PT Manual Therapy Minutes 15  -LN         Manual Rx 1    Manual Rx 1 Location Trial of dry needling using low dosage (9 needles) to the following HNTrP: L lateral popliteal and sural; and 7 needles to tender spots L hamstring muscle (central and distal); using all 1 inch needles.  -LN      Manual Rx 1 Duration DN done with pt prone.  -LN         Manual Rx 2    Manual Rx 2 Location Seated passive left knee flexion  -LN      Manual Rx 2 Duration 10 x 10 sec  -LN         Manual Rx 3    Manual Rx 3 Location Posterior tibial mobs  -LN      Manual Rx 3 Duration 3 x 5 osc gentle  -LN                User Key  (r) = Recorded By, (t) = Taken By, (c) = Cosigned By      Initials Name Provider Type    Gloria Wolf, PT Physical Therapist                        Therapy Education  Education Details: Pt to continue with HEP and can resume her closed chain exercises as tolerated tomorrow. Pt encouraged to drink plenty of water today since having a DN session. Pt to use MH/CP PRN.  Given: HEP, Symptoms/condition management, Mobility training, Other (comment) (DN)  Program: Reinforced, Modified (No closed chain exercises today)  How Provided: Verbal, Demonstration  Provided to: Patient  Level of Understanding: Teach back education performed, Verbalized, Demonstrated              Time Calculation:   Start Time: 1404  Stop Time: 1535  Time Calculation (min): 91 min  Timed Charges  78127 - PT Therapeutic Exercise Minutes: 50  67791 - PT Manual Therapy Minutes: 15  Untimed Charges  PT Moist Heat Minutes: 10  Total Minutes  Timed Charges Total Minutes: 50  Untimed Charges Total Minutes: 10   Total Minutes: 60  Therapy Charges for Today       Code Description Service Date Service Provider Modifiers Qty    68385845576 HC PT THER PROC EA 15 MIN 2/8/2024  Gloria Kan, PT GP 3    05857366154 HC PT MANUAL THERAPY EA 15 MIN 2/8/2024 Gloria Kan, PT GP 1                      Gloria Kan, PT  2/8/2024

## 2024-02-14 ENCOUNTER — OFFICE VISIT (OUTPATIENT)
Dept: ORTHOPEDIC SURGERY | Facility: CLINIC | Age: 44
End: 2024-02-14
Payer: COMMERCIAL

## 2024-02-14 ENCOUNTER — HOSPITAL ENCOUNTER (OUTPATIENT)
Dept: PHYSICAL THERAPY | Facility: HOSPITAL | Age: 44
Setting detail: THERAPIES SERIES
Discharge: HOME | End: 2024-02-14
Payer: COMMERCIAL

## 2024-02-14 VITALS — HEIGHT: 61 IN | OXYGEN SATURATION: 98 % | RESPIRATION RATE: 20 BRPM | BODY MASS INDEX: 36.63 KG/M2 | WEIGHT: 194 LBS

## 2024-02-14 DIAGNOSIS — Z96.652 STATUS POST LEFT PARTIAL KNEE REPLACEMENT: Primary | ICD-10-CM

## 2024-02-14 PROCEDURE — 97110 THERAPEUTIC EXERCISES: CPT

## 2024-02-14 PROCEDURE — 97140 MANUAL THERAPY 1/> REGIONS: CPT

## 2024-02-14 PROCEDURE — 99024 POSTOP FOLLOW-UP VISIT: CPT | Performed by: NURSE PRACTITIONER

## 2024-02-16 ENCOUNTER — HOSPITAL ENCOUNTER (OUTPATIENT)
Dept: PHYSICAL THERAPY | Facility: HOSPITAL | Age: 44
Setting detail: THERAPIES SERIES
Discharge: HOME | End: 2024-02-16
Payer: COMMERCIAL

## 2024-02-16 DIAGNOSIS — Z96.652 STATUS POST LEFT PARTIAL KNEE REPLACEMENT: Primary | ICD-10-CM

## 2024-02-16 PROCEDURE — 97110 THERAPEUTIC EXERCISES: CPT

## 2024-02-16 PROCEDURE — 97140 MANUAL THERAPY 1/> REGIONS: CPT

## 2024-02-20 ENCOUNTER — HOSPITAL ENCOUNTER (OUTPATIENT)
Dept: PHYSICAL THERAPY | Facility: HOSPITAL | Age: 44
Setting detail: THERAPIES SERIES
Discharge: HOME | End: 2024-02-20
Payer: COMMERCIAL

## 2024-02-20 PROCEDURE — 97140 MANUAL THERAPY 1/> REGIONS: CPT

## 2024-02-20 PROCEDURE — 97110 THERAPEUTIC EXERCISES: CPT

## 2024-02-21 DIAGNOSIS — M17.0 BILATERAL PRIMARY OSTEOARTHRITIS OF KNEE: ICD-10-CM

## 2024-02-21 RX ORDER — OXYCODONE AND ACETAMINOPHEN 7.5; 325 MG/1; MG/1
1 TABLET ORAL EVERY 8 HOURS PRN
Qty: 30 TABLET | Refills: 0 | Status: SHIPPED | OUTPATIENT
Start: 2024-02-21

## 2024-02-22 ENCOUNTER — HOSPITAL ENCOUNTER (OUTPATIENT)
Dept: PHYSICAL THERAPY | Facility: HOSPITAL | Age: 44
Setting detail: THERAPIES SERIES
Discharge: HOME | End: 2024-02-22
Payer: COMMERCIAL

## 2024-02-22 DIAGNOSIS — Z96.652 STATUS POST LEFT PARTIAL KNEE REPLACEMENT: Primary | ICD-10-CM

## 2024-02-22 PROCEDURE — 97140 MANUAL THERAPY 1/> REGIONS: CPT

## 2024-02-22 PROCEDURE — 97110 THERAPEUTIC EXERCISES: CPT

## 2024-02-22 NOTE — THERAPY TREATMENT NOTE
"  Outpatient Physical Therapy Ortho Treatment Note   Jazz Bullock     Patient Name: Aida Peralta  : 1980  MRN: [id number]  Today's Date: 2024      Visit Date: 2024    Visit Dx:    ICD-10-CM ICD-9-CM   1. Status post left partial knee replacement  Z96.652 V43.65       Patient Active Problem List   Diagnosis    YULIA (generalized anxiety disorder)    History of seizure    Hypertension    Hypothyroidism    Primary osteoarthritis of both knees    Morbid obesity    Plica of knee, left    Adjustment disorder    Status post right partial knee replacement    Status post left partial knee replacement        Past Medical History:   Diagnosis Date    CTS (carpal tunnel syndrome)     GERD (gastroesophageal reflux disease)     Hypertension     Migraines     Plica of knee, left 10/22/2021    Primary osteoarthritis of both knees 10/22/2021    Seizure     one in  , no meds    Sleep apnea     no machine        Past Surgical History:   Procedure Laterality Date    CHOLECYSTECTOMY      KNEE ARTHROPLASTY, PARTIAL REPLACEMENT Right 2023    Procedure: KNEE ARTHROPLASTY, PARTIAL REPLACEMENT;  Surgeon: Siddhartha Pretty MD;  Location: [facility] OR;  Service: Robotics - Ortho;  Laterality: Right;    TOTAL KNEE ARTHROPLASTY Left 2023    Procedure: KNEE ARTHROPLASTY UNICOMPARTMENTAL;  Surgeon: Siddhartha Pretty MD;  Location: [facility] OR;  Service: Orthopedics;  Laterality: Left;    TUBAL ABDOMINAL LIGATION          PT Ortho       Row Name 24 1400       Subjective    Subjective Comments Pt reports her knee is a little more sore from using the dynasplint at home but reports getting a good stretch with it. Pt still with HS soreness but reports it is much less than it was. \"It did walk me up the other night like it was going to cramp but not as bad as that one time.\" \"The needling does help.\" Pt requested another dry needling session today.  -LN       Subjective Pain    Able to rate subjective pain? --  " -LN       Left Lower Ext    Lt Knee Extension/Flexion AROM 65 degrees supine; 0 degrees knee extension.  -LN    Lt Knee Extension/Flexion PROM 67 degrees seated passive flexion  -LN              User Key  (r) = Recorded By, (t) = Taken By, (c) = Cosigned By      Initials Name Provider Type    Gloria Wolf, PT Physical Therapist                                 PT Assessment/Plan       Row Name 02/22/24 1400          PT Assessment    Assessment Comments Pt with soreness in knee from using dynasplint for static flexion stretching at home the past couple days. She tolerated treatment fairly well as well as increased dosage of dry needling. Pt with decreased L knee ROM measured today but the quality of the movement improved. Overall decreased HS tightness reported and palpated today and she does report benefit from dry needling. Edema appears decreased.  She still has decreased tolerance to passive flexion stretching due to pain level with stretching.  -LN        PT Plan    PT Frequency 2x/week  -LN     Predicted Duration of Therapy Intervention (PT) DN 1 x week as indicated.  -LN     PT Plan Comments Cont per POC; progress exercises as tolerated.  -LN               User Key  (r) = Recorded By, (t) = Taken By, (c) = Cosigned By      Initials Name Provider Type    Gloria Wolf, PT Physical Therapist                     Modalities       Row Name 02/22/24 1400             Moist Heat    MH Applied Yes  -LN      Location Anterior and posterior L knee/LE with pt supine. (2 cervical MHP)  -LN      PT Moist Heat Minutes 12  -LN      MH Prior to Rx Yes  -LN         Ice    Patient reports will apply ice at home to involved area Yes  -LN                User Key  (r) = Recorded By, (t) = Taken By, (c) = Cosigned By      Initials Name Provider Type    Gloria Wolf, PT Physical Therapist                   OP Exercises       Row Name 02/22/24 1400 02/22/24 1300          Precautions    Existing  "Precautions/Restrictions no known precautions/restrictions  -LN --        Subjective    Subjective Comments Pt reports her knee is a little more sore from using the dynasplint at home but reports getting a good stretch with it. Pt still with HS soreness but reports it is much less than it was. \"It did walk me up the other night like it was going to cramp but not as bad as that one time.\" \"The needling does help.\" Pt requested another dry needling session today.  -LN --        Subjective Pain    Able to rate subjective pain? --  -LN --        Total Minutes    15518 - PT Therapeutic Exercise Minutes 50  -LN --     35290 - PT Manual Therapy Minutes -- 15  -LN        Exercise 1    Exercise Name 1 Heelslides with sheet  -LN --     Cueing 1 Verbal;Tactile;Demo  -LN --     Time 1 7 minutes  -LN --        Exercise 2    Exercise Name 2 Wallslides  -LN --     Cueing 2 Verbal;Tactile;Demo  -LN --     Time 2 7 minutes  -LN --        Exercise 3    Exercise Name 3 Bike- airdyne seat 2  -LN --     Cueing 3 Verbal;Tactile;Demo  -LN --     Time 3 9 minutes  -LN --        Exercise 4    Exercise Name 4 supine HS stretch with sheet/supine HS stretch with adduction  -LN --     Cueing 4 Verbal;Tactile;Demo  -LN --     Reps 4 10/10  -LN --     Time 4 10 sec  -LN --        Exercise 5    Exercise Name 5 QS over single towel roll  -LN --     Cueing 5 Verbal;Tactile;Demo  -LN --     Sets 5 Citizen of [place] STIM (10/10)  -LN --     Time 5 10 min  -LN --        Exercise 6    Exercise Name 6 SAQ  -LN --        Exercise 7    Exercise Name 7 S/L hip abduction  -LN --     Cueing 7 Verbal;Tactile;Demo  -LN --     Reps 7 20  -LN --        Exercise 8    Exercise Name 8 SLR  -LN --     Cueing 8 Verbal;Tactile;Demo  -LN --     Reps 8 20  -LN --     Time 8 2 sec  -LN --        Exercise 9    Exercise Name 9 Prone hip extension  -LN --     Cueing 9 Verbal  -LN --     Reps 9 20  -LN --        Exercise 10    Exercise Name 10 S/L hip adduction  -LN --     Cueing 10 " Verbal;Tactile;Demo  -LN --     Reps 10 20  -LN --        Exercise 11    Exercise Name 11 Heel prop  -LN --        Exercise 12    Exercise Name 12 LAQ  -LN --     Cueing 12 Verbal;Tactile;Demo  -LN --     Reps 12 20  -LN --        Exercise 13    Exercise Name 13 TKE  -LN --     Cueing 13 Verbal  -LN --     Reps 13 20  -LN --     Time 13 black  -LN --        Exercise 14    Exercise Name 14 standing calf raises  -LN --     Cueing 14 Verbal;Tactile;Demo  -LN --     Reps 14 20  -LN --        Exercise 15    Exercise Name 15 standing Hamstring curl  -LN --     Cueing 15 Verbal;Tactile;Demo  -LN --     Reps 15 14  -LN --        Exercise 16    Exercise Name 16 Forward step ups on 4 inch step  -LN --     Cueing 16 Verbal;Tactile;Demo  -LN --     Reps 16 20 each  -LN --               User Key  (r) = Recorded By, (t) = Taken By, (c) = Cosigned By      Initials Name Provider Type    Gloria Wolf, PT Physical Therapist                             Manual Rx (last 36 hours)       Manual Treatments       Row Name 02/22/24 1300             Total Minutes    10700 - PT Manual Therapy Minutes 15  -LN         Manual Rx 1    Manual Rx 1 Location Trial of dry needling using high dosage (23 needles) to the following HNTrP: L lateral popliteal; sural; iliotibial band, tibial and common fibular;10 needles to tender spots L hamstring muscle and 2 additional 1 inch needles to tender spots distal ITB; 6 needles to tender spots in L Quads;using all 1 inch needles.  DN done in prone and supine position.  -LN      Manual Rx 1 Duration DN done with pt prone.  -LN         Manual Rx 2    Manual Rx 2 Location Seated passive left knee flexion  -LN      Manual Rx 2 Duration 10 x 10 sec  -LN         Manual Rx 3    Manual Rx 3 Location Posterior tibial mobs  -LN      Manual Rx 3 Duration 3 x 5 osc gentle  -LN                User Key  (r) = Recorded By, (t) = Taken By, (c) = Cosigned By      Initials Name Provider Type    Gloria Wolf  Jessi, PT Physical Therapist                        Therapy Education  Education Details: Pt to continue with HEP and continue doing dynasplint for knee flexion as instructed. Pt advised to drink plenty of water today due to having dry needling session. Pt to use MH/CP PRN.  Given: HEP, Symptoms/condition management, Edema management  Program: Reinforced  How Provided: Verbal, Demonstration  Provided to: Patient  Level of Understanding: Teach back education performed, Verbalized, Demonstrated              Time Calculation:   Start Time: 1400  Stop Time: 1540  Time Calculation (min): 100 min  Timed Charges  68146 - PT Therapeutic Exercise Minutes: 50  81445 - PT Manual Therapy Minutes: 15  Untimed Charges  PT Moist Heat Minutes: 12  Total Minutes  Timed Charges Total Minutes: 50  Untimed Charges Total Minutes: 12   Total Minutes: 62  Therapy Charges for Today       Code Description Service Date Service Provider Modifiers Qty    55751846540 HC PT THER PROC EA 15 MIN 2/22/2024 Gloria Kan, PT GP 3    41860683402 HC PT MANUAL THERAPY EA 15 MIN 2/22/2024 Gloria Kan, PT GP 1                      Gloria Kan, PT  2/22/2024

## 2024-02-28 ENCOUNTER — HOSPITAL ENCOUNTER (OUTPATIENT)
Dept: PHYSICAL THERAPY | Facility: HOSPITAL | Age: 44
Setting detail: THERAPIES SERIES
Discharge: HOME | End: 2024-02-28
Payer: COMMERCIAL

## 2024-02-28 DIAGNOSIS — Z96.652 STATUS POST LEFT PARTIAL KNEE REPLACEMENT: Primary | ICD-10-CM

## 2024-02-28 PROCEDURE — 97110 THERAPEUTIC EXERCISES: CPT

## 2024-02-28 PROCEDURE — 97140 MANUAL THERAPY 1/> REGIONS: CPT

## 2024-02-28 NOTE — THERAPY TREATMENT NOTE
"  Outpatient Physical Therapy Ortho Treatment Note   Jazz Bullock     Patient Name: Aida Peralta  : 1980  MRN: [id number]  Today's Date: 2024      Visit Date: 2024    Visit Dx:    ICD-10-CM ICD-9-CM   1. Status post left partial knee replacement  Z96.652 V43.65       Patient Active Problem List   Diagnosis    YULIA (generalized anxiety disorder)    History of seizure    Hypertension    Hypothyroidism    Primary osteoarthritis of both knees    Morbid obesity    Plica of knee, left    Adjustment disorder    Status post right partial knee replacement    Status post left partial knee replacement        Past Medical History:   Diagnosis Date    CTS (carpal tunnel syndrome)     GERD (gastroesophageal reflux disease)     Hypertension     Migraines     Plica of knee, left 10/22/2021    Primary osteoarthritis of both knees 10/22/2021    Seizure     one in  , no meds    Sleep apnea     no machine        Past Surgical History:   Procedure Laterality Date    CHOLECYSTECTOMY      KNEE ARTHROPLASTY, PARTIAL REPLACEMENT Right 2023    Procedure: KNEE ARTHROPLASTY, PARTIAL REPLACEMENT;  Surgeon: Siddhartha Pretty MD;  Location: [facility] OR;  Service: Robotics - Ortho;  Laterality: Right;    TOTAL KNEE ARTHROPLASTY Left 2023    Procedure: KNEE ARTHROPLASTY UNICOMPARTMENTAL;  Surgeon: Siddhartha Pretty MD;  Location: [facility] OR;  Service: Orthopedics;  Laterality: Left;    TUBAL ABDOMINAL LIGATION          PT Ortho       Row Name 24 1400       Subjective    Subjective Comments Pt reports that she isn't having much pain in her knee but \"it is still really stiff.\" Pt reports occasional pain in the back of her leg but not as bad as it was. She reports occasional pain in kneecap. Pt also reports occasional aching/pain in L shin/ankle/top of foot jett with passive knee flexion.  \"I am using the dynasplint at home and I was able to turn it up a notch but I can't tell that it is really helping.\"  -LN "              User Key  (r) = Recorded By, (t) = Taken By, (c) = Cosigned By      Initials Name Provider Type    Gloria Wolf, PT Physical Therapist                                 PT Assessment/Plan       Row Name 02/28/24 1400          PT Assessment    Assessment Comments Pt continues with soreness and stiffness in L knee and still with limited knee flexion. With passive flexion stretching today, PT noticed some improved joint movment in knee but flexion is still very limited. She is tolerating exercises fairly well. Pt ambulated without AD today with minimal antalgic gait on L and decreased knee flexion noted. Pt with good knee extension noted. Pt continues to use dynasplint at home for knee flexion stretching.  -LN        PT Plan    PT Frequency 2x/week  -LN     Predicted Duration of Therapy Intervention (PT) DN 1 x week as indicated.  -LN     PT Plan Comments Cont per POC; progress exercises as tolerated. DN next visit as needed.  -LN               User Key  (r) = Recorded By, (t) = Taken By, (c) = Cosigned By      Initials Name Provider Type    Gloria Wolf, PT Physical Therapist                     Modalities       Row Name 02/28/24 1400             Precautions    Existing Precautions/Restrictions no known precautions/restrictions  -LN         Moist Heat    MH Applied Yes  -LN      Location Anterior and posterior L knee/LE with pt supine. (2 cervical MHP)  -LN      PT Moist Heat Minutes 12  -LN      MH Prior to Rx Yes  -LN         Ice    Ice Applied Yes  -LN      Location anterior and posterior L knee with patient supine.  -LN      PT Ice Rx Minutes 10  -LN      Ice S/P Rx Yes  -LN      Patient reports will apply ice at home to involved area Yes  -LN                User Key  (r) = Recorded By, (t) = Taken By, (c) = Cosigned By      Initials Name Provider Type    Gloria Wolf, PT Physical Therapist                   OP Exercises       Row Name 02/28/24 1400 02/28/24  "1300          Precautions    Existing Precautions/Restrictions no known precautions/restrictions  -LN --        Subjective    Subjective Comments Pt reports that she isn't having much pain in her knee but \"it is still really stiff.\" Pt reports occasional pain in the back of her leg but not as bad as it was. She reports occasional pain in kneecap. Pt also reports occasional aching/pain in L shin/ankle/top of foot jett with passive knee flexion.  \"I am using the dynasplint at home and I was able to turn it up a notch but I can't tell that it is really helping.\"  -LN --        Total Minutes    21997 - PT Therapeutic Exercise Minutes 50  -LN --     45642 - PT Manual Therapy Minutes -- 12  -LN        Exercise 1    Exercise Name 1 Heelslides with sheet  -LN --     Cueing 1 Verbal;Tactile;Demo  -LN --     Time 1 7 minutes  -LN --        Exercise 2    Exercise Name 2 Wallslides  -LN --     Cueing 2 Verbal;Tactile;Demo  -LN --     Time 2 7 minutes  -LN --        Exercise 3    Exercise Name 3 Bike- airdyne seat 2  -LN --     Cueing 3 Verbal;Tactile;Demo  -LN --     Time 3 9 minutes  -LN --        Exercise 4    Exercise Name 4 supine HS stretch with sheet/supine HS stretch with adduction  -LN --     Cueing 4 Verbal;Tactile;Demo  -LN --     Reps 4 10/10  -LN --     Time 4 10 sec  -LN --        Exercise 5    Exercise Name 5 QS over single towel roll  -LN --     Cueing 5 Verbal;Tactile;Demo  -LN --     Sets 5 [place] STIM (10/10)  -LN --     Time 5 10 min  -LN --        Exercise 6    Exercise Name 6 SAQ  -LN --        Exercise 7    Exercise Name 7 S/L hip abduction  -LN --     Cueing 7 Verbal;Tactile;Demo  -LN --     Reps 7 20  -LN --        Exercise 8    Exercise Name 8 SLR  -LN --     Cueing 8 Verbal;Tactile;Demo  -LN --     Reps 8 20  -LN --     Time 8 2 sec  -LN --        Exercise 9    Exercise Name 9 Prone hip extension  -LN --     Cueing 9 Verbal  -LN --     Reps 9 20  -LN --        Exercise 10    Exercise Name 10 S/L hip " adduction  -LN --     Cueing 10 Verbal;Tactile;Demo  -LN --     Reps 10 20  -LN --        Exercise 11    Exercise Name 11 Heel prop  -LN --        Exercise 12    Exercise Name 12 LAQ  -LN --     Cueing 12 Verbal;Tactile;Demo  -LN --     Reps 12 20  -LN --        Exercise 13    Exercise Name 13 TKE  -LN --     Cueing 13 Verbal  -LN --     Reps 13 20  -LN --     Time 13 black  -LN --        Exercise 14    Exercise Name 14 standing calf raises  -LN --     Cueing 14 Verbal;Tactile;Demo  -LN --     Reps 14 20  -LN --        Exercise 15    Exercise Name 15 standing Hamstring curl  -LN --     Cueing 15 Verbal;Tactile;Demo  -LN --     Reps 15 11  -LN --        Exercise 16    Exercise Name 16 Forward step ups on 6 inch step  -LN --     Cueing 16 Verbal;Tactile;Demo  -LN --     Reps 16 20 each  -LN --               User Key  (r) = Recorded By, (t) = Taken By, (c) = Cosigned By      Initials Name Provider Type    LN Gloria Kan, PT Physical Therapist                             Manual Rx (last 36 hours)       Manual Treatments       Row Name 02/28/24 1300             Total Minutes    04770 - PT Manual Therapy Minutes 12  -LN         Manual Rx 2    Manual Rx 2 Location Seated passive left knee flexion  -LN      Manual Rx 2 Duration 10 x 10 sec  -LN         Manual Rx 3    Manual Rx 3 Location Posterior tibial mobs  -LN      Manual Rx 3 Duration 3 x 5 osc gentle  -LN                User Key  (r) = Recorded By, (t) = Taken By, (c) = Cosigned By      Initials Name Provider Type    Gloria Wolf, PT Physical Therapist                        Therapy Education  Given: HEP, Symptoms/condition management, Pain management, Edema management, Mobility training  Program: Reinforced  How Provided: Verbal, Demonstration  Provided to: Patient  Level of Understanding: Teach back education performed, Verbalized, Demonstrated              Time Calculation:   Start Time: 1400  Stop Time: 1520  Time Calculation (min): 80  min  Timed Charges  98632 - PT Therapeutic Exercise Minutes: 50  33192 - PT Manual Therapy Minutes: 12  Untimed Charges  PT Moist Heat Minutes: 12  PT Ice Rx Minutes: 10  Total Minutes  Timed Charges Total Minutes: 50  Untimed Charges Total Minutes: 22   Total Minutes: 72  Therapy Charges for Today       Code Description Service Date Service Provider Modifiers Qty    77388878876 HC PT THER PROC EA 15 MIN 2/28/2024 Gloria Kan, PT GP 3    25940500916 HC PT MANUAL THERAPY EA 15 MIN 2/28/2024 Gloria Kan, PT GP 1                      Gloria Kan, PT  2/28/2024

## 2024-03-01 ENCOUNTER — HOSPITAL ENCOUNTER (OUTPATIENT)
Dept: PHYSICAL THERAPY | Facility: HOSPITAL | Age: 44
Setting detail: THERAPIES SERIES
Discharge: HOME | End: 2024-03-01
Payer: COMMERCIAL

## 2024-03-01 DIAGNOSIS — Z96.652 STATUS POST LEFT PARTIAL KNEE REPLACEMENT: Primary | ICD-10-CM

## 2024-03-01 PROCEDURE — 97140 MANUAL THERAPY 1/> REGIONS: CPT

## 2024-03-01 PROCEDURE — 97110 THERAPEUTIC EXERCISES: CPT

## 2024-03-01 NOTE — THERAPY TREATMENT NOTE
"  Outpatient Physical Therapy Ortho Treatment Note   Jazz Bullock     Patient Name: Aida Peralta  : 1980  MRN: [id number]  Today's Date: 3/1/2024      Visit Date: 2024    Visit Dx:    ICD-10-CM ICD-9-CM   1. Status post left partial knee replacement  Z96.652 V43.65       Patient Active Problem List   Diagnosis    YULIA (generalized anxiety disorder)    History of seizure    Hypertension    Hypothyroidism    Primary osteoarthritis of both knees    Morbid obesity    Plica of knee, left    Adjustment disorder    Status post right partial knee replacement    Status post left partial knee replacement        Past Medical History:   Diagnosis Date    CTS (carpal tunnel syndrome)     GERD (gastroesophageal reflux disease)     Hypertension     Migraines     Plica of knee, left 10/22/2021    Primary osteoarthritis of both knees 10/22/2021    Seizure     one in  , no meds    Sleep apnea     no machine        Past Surgical History:   Procedure Laterality Date    CHOLECYSTECTOMY      KNEE ARTHROPLASTY, PARTIAL REPLACEMENT Right 2023    Procedure: KNEE ARTHROPLASTY, PARTIAL REPLACEMENT;  Surgeon: Siddhartha Pretty MD;  Location: [facility] OR;  Service: Robotics - Ortho;  Laterality: Right;    TOTAL KNEE ARTHROPLASTY Left 2023    Procedure: KNEE ARTHROPLASTY UNICOMPARTMENTAL;  Surgeon: Siddhartha Pretty MD;  Location: [facility] OR;  Service: Orthopedics;  Laterality: Left;    TUBAL ABDOMINAL LIGATION          PT Ortho       Row Name 24 1400       Subjective    Subjective Comments Pt reports that her knee doesn't really hurt too bad but \"feels weird in there.\" Pt is using dynasplint at home for flexion and reports that she has noticed her knee not wanting to straighten so she started doing the heel prop more. Pt c/o occasional pain on the top of her left foot and she has had to take the dynasplint off early sometimes because of her foot hurting. \"When I was bending my knee at home, it felt like it " "may be a little looser.\" Pt requests dry needling today; \"it seems to help.\"  -LN       Left Lower Ext    Lt Knee Extension/Flexion AROM 62 degrees flexion seated  -LN    Lt Knee Extension/Flexion PROM 67 degrees seated passive flexion  -LN              User Key  (r) = Recorded By, (t) = Taken By, (c) = Cosigned By      Initials Name Provider Type    Gloria Wolf, PT Physical Therapist                                 PT Assessment/Plan       Row Name 03/01/24 1600          PT Assessment    Assessment Comments Pt continues with soreness and stiffness in L knee and still with limited knee flexion. Still with very limited knee flexion but the quality of the flexion she has is improved. She is tolerating exercises fairly well. Pt continues to use dynasplint at home for knee flexion stretching. Pt tolerated dry needling well today and reports benefit. Pt with overall decreased tightness reported and palpated in L HS. She does still report occasional L ankle and foot pain (? nerve pain).  -LN        PT Plan    PT Frequency 2x/week  -LN     Predicted Duration of Therapy Intervention (PT) DN 1 x week as indicated.  -LN     PT Plan Comments Cont per POC; progress exercises as tolerated. Pt only having 1 visit next week due to going out of town. She has MD appointment 3/6/24.  -LN               User Key  (r) = Recorded By, (t) = Taken By, (c) = Cosigned By      Initials Name Provider Type    Gloria Wolf, PT Physical Therapist                     Modalities       Row Name 03/01/24 1400               MH Applied Yes  -LN      Location Anterior and posterior L knee/LE with pt supine. (2 cervical MHP)  -LN      PT Moist Heat Minutes 12  -LN      MH Prior to Rx Yes  -LN             Location --      PT Ice Rx Minutes --      Ice S/P Rx --      Patient reports will apply ice at home to involved area Yes  -LN                User Key  (r) = Recorded By, (t) = Taken By, (c) = Cosigned By      Initials Name " "Provider Type    LN Gloria Kan, PT Physical Therapist                   OP Exercises       Row Name 03/01/24 1400 03/01/24 1300          Precautions    Existing Precautions/Restrictions no known precautions/restrictions  -LN --        Subjective    Subjective Comments Pt reports that her knee doesn't really hurt too bad but \"feels weird in there.\" Pt is using dynasplint at home for flexion and reports that she has noticed her knee not wanting to straighten so she started doing the heel prop more. Pt c/o occasional pain on the top of her left foot and she has had to take the dynasplint off early sometimes because of her foot hurting. \"When I was bending my knee at home, it felt like it may be a little looser.\" Pt requests dry needling today; \"it seems to help.\"  -LN --        Total Minutes    99844 - PT Therapeutic Exercise Minutes 45  -LN --     17383 - PT Manual Therapy Minutes -- 12  -LN        Exercise 1    Exercise Name 1 Heelslides with sheet  -LN --     Cueing 1 Verbal;Tactile;Demo  -LN --     Time 1 7 minutes  -LN --        Exercise 2    Exercise Name 2 Wallslides  -LN --     Cueing 2 Verbal;Tactile;Demo  -LN --     Time 2 7 minutes  -LN --        Exercise 3    Exercise Name 3 Bike- airdyne seat 2  -LN --     Cueing 3 Verbal;Tactile;Demo  -LN --     Time 3 9 minutes  -LN --        Exercise 4    Exercise Name 4 supine HS stretch with sheet/supine HS stretch with adduction  -LN --     Cueing 4 Verbal;Tactile;Demo  -LN --     Reps 4 10/10  -LN --     Time 4 10 sec  -LN --        Exercise 5    Exercise Name 5 QS over single towel roll  -LN --     Cueing 5 Verbal;Tactile;Demo  -LN --     Sets 5 [place] STIM (10/10)  -LN --     Time 5 10 min  -LN --        Exercise 6    Exercise Name 6 SAQ  -LN --        Exercise 7    Exercise Name 7 S/L hip abduction  -LN --     Cueing 7 Verbal;Tactile;Demo  -LN --     Reps 7 20  -LN --        Exercise 8    Exercise Name 8 SLR  -LN --     Cueing 8 Verbal;Tactile;Demo  " -LN --     Reps 8 20  -LN --     Time 8 2 sec  -LN --        Exercise 9    Exercise Name 9 Prone hip extension  -LN --     Cueing 9 Verbal  -LN --     Reps 9 20  -LN --        Exercise 10    Exercise Name 10 S/L hip adduction  -LN --     Cueing 10 Verbal;Tactile;Demo  -LN --     Reps 10 20  -LN --        Exercise 11    Exercise Name 11 Heel prop  -LN --     Additional Comments HEP  -LN --        Exercise 12    Exercise Name 12 LAQ  -LN --     Cueing 12 Verbal;Tactile;Demo  -LN --     Reps 12 20  -LN --        Exercise 13    Exercise Name 13 TKE  -LN --     Cueing 13 Verbal  -LN --     Reps 13 20  -LN --     Time 13 black  -LN --        Exercise 14    Exercise Name 14 standing calf raises  -LN --     Cueing 14 Verbal;Tactile;Demo  -LN --     Reps 14 20  -LN --        Exercise 15    Exercise Name 15 standing Hamstring curl  -LN --     Cueing 15 Verbal;Tactile;Demo  -LN --     Reps 15 11  -LN --        Exercise 16    Exercise Name 16 Forward step ups on 6 inch step  -LN --     Cueing 16 Verbal;Tactile;Demo  -LN --     Reps 16 20 each  -LN --               User Key  (r) = Recorded By, (t) = Taken By, (c) = Cosigned By      Initials Name Provider Type    LN Gloria Kan, PT Physical Therapist                             Manual Rx (last 36 hours)       Manual Treatments       Row Name 03/01/24 1300             Total Minutes    69630 - PT Manual Therapy Minutes 12  -LN         Manual Rx 1    Manual Rx 1 Location Trial of dry needling using high dosage (25 needles) to the following HNTrP: L lateral popliteal; sural; iliotibial band, tibial, common fibular, saphenous, and 1/2 inch needle to deep fibular; 8 needles to tender spots L hamstring muscle and 2 additional 1 inch needles to tender spots distal ITB & 3- 1 inch needles to tender spots proximal gastroc; 4 needles to tender spots in L Quads;using all 1 inch needles. Also 1 - 1/2 needle to second nerve point of common fibular nerve at retinaculum.  DN done in  prone and supine position.  -LN      Manual Rx 1 Duration DN done with pt prone.  -LN         Manual Rx 2    Manual Rx 2 Location Seated passive left knee flexion  -LN      Manual Rx 2 Duration 10 x 10 sec  -LN         Manual Rx 3    Manual Rx 3 Location Posterior tibial mobs  -LN      Manual Rx 3 Duration 3 x 5 osc gentle  -LN                User Key  (r) = Recorded By, (t) = Taken By, (c) = Cosigned By      Initials Name Provider Type    LN Gloria Kan, PT Physical Therapist                        Therapy Education  Education Details: Pt to continue with HEP and continue using dynasplint for knee flexion and recommend that she do a heel prop 2 times a day for knee extension. Pt advised to drink plenty of water due to having a dry needling session today. Pt to use MH/CP PRN.  Given: HEP, Symptoms/condition management, Pain management, Edema management  Program: Reinforced  How Provided: Verbal, Demonstration  Provided to: Patient  Level of Understanding: Teach back education performed, Verbalized, Demonstrated              Time Calculation:   Start Time: 1405  Stop Time: 1540  Time Calculation (min): 95 min  Timed Charges  02850 - PT Therapeutic Exercise Minutes: 45  73887 - PT Manual Therapy Minutes: 12  Untimed Charges  PT Moist Heat Minutes: 12  Total Minutes  Timed Charges Total Minutes: 45  Untimed Charges Total Minutes: 12   Total Minutes: 57  Therapy Charges for Today       Code Description Service Date Service Provider Modifiers Qty    85087418954 HC PT THER PROC EA 15 MIN 3/1/2024 Gloria Kan, PT GP 3    75184180221 HC PT MANUAL THERAPY EA 15 MIN 3/1/2024 Gloria Kan, PT GP 1                      Gloria Kan, PT  3/1/2024

## 2024-03-05 ENCOUNTER — HOSPITAL ENCOUNTER (OUTPATIENT)
Dept: PHYSICAL THERAPY | Facility: HOSPITAL | Age: 44
Setting detail: THERAPIES SERIES
Discharge: HOME | End: 2024-03-05
Payer: COMMERCIAL

## 2024-03-05 DIAGNOSIS — Z96.652 STATUS POST LEFT PARTIAL KNEE REPLACEMENT: Primary | ICD-10-CM

## 2024-03-05 PROCEDURE — 97110 THERAPEUTIC EXERCISES: CPT

## 2024-03-05 PROCEDURE — 97140 MANUAL THERAPY 1/> REGIONS: CPT

## 2024-03-05 NOTE — THERAPY TREATMENT NOTE
Outpatient Physical Therapy Ortho Treatment Note   Jazz Bullock     Patient Name: Aida Peralta  : 1980  MRN: [id number]  Today's Date: 3/5/2024      Visit Date: 2024    Visit Dx:    ICD-10-CM ICD-9-CM   1. Status post left partial knee replacement  Z96.652 V43.65       Patient Active Problem List   Diagnosis    YULIA (generalized anxiety disorder)    History of seizure    Hypertension    Hypothyroidism    Primary osteoarthritis of both knees    Morbid obesity    Plica of knee, left    Adjustment disorder    Status post right partial knee replacement    Status post left partial knee replacement        Past Medical History:   Diagnosis Date    CTS (carpal tunnel syndrome)     GERD (gastroesophageal reflux disease)     Hypertension     Migraines     Plica of knee, left 10/22/2021    Primary osteoarthritis of both knees 10/22/2021    Seizure     one in  2018, no meds    Sleep apnea     no machine        Past Surgical History:   Procedure Laterality Date    CHOLECYSTECTOMY      KNEE ARTHROPLASTY, PARTIAL REPLACEMENT Right 2023    Procedure: KNEE ARTHROPLASTY, PARTIAL REPLACEMENT;  Surgeon: Siddhartha Pretty MD;  [facility];  Service: Robotics - Ortho;  Laterality: Right;    TOTAL KNEE ARTHROPLASTY Left 2023    Procedure: KNEE ARTHROPLASTY UNICOMPARTMENTAL;  Surgeon: Siddhartha Pretty MD;  Location[facility];  Service: Orthopedics;  Laterality: Left;    TUBAL ABDOMINAL LIGATION          PT Ortho       Row Name 24 1400       Subjective    Subjective Comments pt states her leg doesn't feel too bad today  -              User Key  (r) = Recorded By, (t) = Taken By, (c) = Cosigned By      Initials Name Provider Type     Edi Winchester, PTA Physical Therapist Assistant                                 PT Assessment/Plan       Row Name 24 2629          PT Assessment    Assessment Comments pt with decreased hamstring symptoms generally witout pain but does have occasional  quick pain that doesn't last; pt still with very limited tolerance to passive stretching due to pain and showing minimal change in ROM; tolerates ex's well  -        PT Plan    PT Plan Comments Cont per POC, continue with HEP  -               User Key  (r) = Recorded By, (t) = Taken By, (c) = Cosigned By      Initials Name Provider Type     Edi Winchester PTA Physical Therapist Assistant                     Modalities       Row Name 03/05/24 1400             Ice    Location ant/post (L) knee - pt supine  -      PT Ice Rx Minutes 10  -      Ice S/P Rx Yes  -                User Key  (r) = Recorded By, (t) = Taken By, (c) = Cosigned By      Initials Name Provider Type     Edi Winchester PTA Physical Therapist Assistant                   OP Exercises       Row Name 03/05/24 1531 03/05/24 1400          Precautions    Existing Precautions/Restrictions -- no known precautions/restrictions  -        Subjective    Subjective Comments -- pt states her leg doesn't feel too bad today  -        Total Minutes    82038 - PT Therapeutic Exercise Minutes 45  -MH --     85083 - PT Manual Therapy Minutes 10  -MH --        Exercise 1    Exercise Name 1 -- Heelslides with sheet  -     Cueing 1 -- Verbal;Tactile;Demo  [facility]     Time 1 -- 7 minutes  -        Exercise 2    Exercise Name 2 -- Wallslides  -     Cueing 2 -- Verbal;Tactile;Demo  [facility]     Time 2 -- 7 minutes  -        Exercise 3    Exercise Name 3 -- Bike- airdyne seat 2  -     Cueing 3 -- Verbal;Tactile;Demo  [facility]     Time 3 -- 9 minutes  -        Exercise 4    Exercise Name 4 -- supine HS stretch with sheet/supine HS stretch with adduction  -     Cueing 4 -- Verbal;Tactile;Demo  -     Reps 4 -- 10/10  -     Time 4 -- 10 sec  -        Exercise 5    Exercise Name 5 -- QS over single towel roll  -     Cueing 5 -- Verbal;Tactile;Demo  [facility]     Sets 5 -- [place] STIM (10/10)  -     Time 5 -- 10 min  -        Exercise 6    Exercise Name 6  -- SAQ  -        Exercise 7    Exercise Name 7 -- S/L hip abduction  -     Cueing 7 -- Verbal;Tactile;Demo  -     Reps 7 -- 20  -        Exercise 8    Exercise Name 8 -- SLR  -MH     Cueing 8 -- Verbal;Tactile;Demo  -     Reps 8 -- 20  -     Time 8 -- 2 sec  -        Exercise 9    Exercise Name 9 -- Prone hip extension  -MH     Cueing 9 -- Verbal  -     Reps 9 -- 20  -        Exercise 10    Exercise Name 10 -- S/L hip adduction  -     Cueing 10 -- Verbal;Tactile;Demo  -     Reps 10 -- 20  -        Exercise 11    Exercise Name 11 -- Heel prop  -     Time 11 -- 5 min  -        Exercise 12    Exercise Name 12 -- LAQ  -     Cueing 12 -- Verbal;Tactile;Demo  -     Reps 12 -- 20  -        Exercise 13    Exercise Name 13 -- TKE  -     Cueing 13 -- Verbal  -     Reps 13 -- 20  -     Time 13 -- black  -        Exercise 14    Exercise Name 14 -- standing calf raises  -     Cueing 14 -- Verbal;Tactile;Demo  -     Reps 14 -- 20  -MH        Exercise 15    Exercise Name 15 -- standing Hamstring curl  -     Cueing 15 -- Verbal;Tactile;Demo  -     Reps 15 -- 15  -        Exercise 16    Exercise Name 16 -- Forward step ups on 6 inch step  -     Cueing 16 -- Verbal;Tactile;Demo  -     Reps 16 -- 20 each  -               User Key  (r) = Recorded By, (t) = Taken By, (c) = Cosigned By      Initials Name Provider Type     Edi Winchester, PTA Physical Therapist Assistant                             Manual Rx (Last 36 Hours)       Manual Treatments       Row Name 03/05/24 1531 03/05/24 1400          Total Minutes    02464 - PT Manual Therapy Minutes 10  -MH --        Manual Rx 2    Manual Rx 2 Location -- Seated passive left knee flexion  -     Manual Rx 2 Duration -- 10 x 10 sec  -        Manual Rx 3    Manual Rx 3 Location -- Posterior tibial mobs  -     Manual Rx 3 Duration -- 3 x 5 osc gentle  -               User Key  (r) = Recorded By, (t) = Taken By, (c) = Cosigned  By      Initials Name Provider Type     Edi Winchester PTA Physical Therapist Assistant                        Therapy Education  Given: HEP, Symptoms/condition management  Program: Reinforced  How Provided: Verbal, Demonstration  Provided to: Patient  Level of Understanding: Teach back education performed, Verbalized, Demonstrated              Time Calculation:   Start Time: 1400  Stop Time: 1524  Time Calculation (min): 84 min  Timed Charges  99258 - PT Therapeutic Exercise Minutes: 45  73521 - PT Manual Therapy Minutes: 10  Untimed Charges  PT Ice Rx Minutes: 10  Total Minutes  Timed Charges Total Minutes: 55  Untimed Charges Total Minutes: 10   Total Minutes: 55  Therapy Charges for Today       Code Description Service Date Service Provider Modifiers Qty    67936071246 HC PT THER PROC EA 15 MIN 3/5/2024 Edi Winchester PTA GP 3    11922231151 HC PT MANUAL THERAPY EA 15 MIN 3/5/2024 Edi Winchester PTA GP 1                      Edi Winchester PTA  3/5/2024

## 2024-03-06 ENCOUNTER — OFFICE VISIT (OUTPATIENT)
Dept: ORTHOPEDIC SURGERY | Facility: CLINIC | Age: 44
End: 2024-03-06
Payer: COMMERCIAL

## 2024-03-06 VITALS — OXYGEN SATURATION: 96 % | HEIGHT: 61 IN | HEART RATE: 89 BPM | WEIGHT: 194 LBS | BODY MASS INDEX: 36.63 KG/M2

## 2024-03-06 DIAGNOSIS — Z96.652 STATUS POST LEFT PARTIAL KNEE REPLACEMENT: Primary | ICD-10-CM

## 2024-03-06 DIAGNOSIS — M17.0 BILATERAL PRIMARY OSTEOARTHRITIS OF KNEE: ICD-10-CM

## 2024-03-06 PROCEDURE — 99024 POSTOP FOLLOW-UP VISIT: CPT | Performed by: NURSE PRACTITIONER

## 2024-03-06 RX ORDER — MELOXICAM 15 MG/1
15 TABLET ORAL DAILY
Qty: 90 TABLET | Refills: 3 | Status: SHIPPED | OUTPATIENT
Start: 2024-03-06

## 2024-03-06 RX ORDER — OXYCODONE AND ACETAMINOPHEN 7.5; 325 MG/1; MG/1
1 TABLET ORAL EVERY 8 HOURS PRN
Qty: 40 TABLET | Refills: 0 | Status: SHIPPED | OUTPATIENT
Start: 2024-03-06

## 2024-03-06 NOTE — PROGRESS NOTES
[facility]  Post Operative Visit      Patient Name: Aida Peralta  : 1980  Primary Care Physician: Aida Sheldon APRN        Chief Complaint:  S/P left partial knee replacement with Dr. Pretty on 23.    HPI:   Aida Peralta is a 43 y.o. who presents today for postoperative evaluation.     Continues to have limited flexion of the knee. Most recent PT measurement at 64 degrees. Wearing Dynasplint 30 minutes, 3 times per day. No fevers. Pain stable to minimally increased. Doing home exercises 3-4 times per day.       Past Medical/Surgical, Social and Family History:  I have reviewed and/or updated pertinent history as noted in the medical record including:  Past Medical History:   Diagnosis Date    CTS (carpal tunnel syndrome)     GERD (gastroesophageal reflux disease)     Hypertension     Migraines     Plica of knee, left 10/22/2021    Primary osteoarthritis of both knees 10/22/2021    Seizure     one in  2018, no meds    Sleep apnea     no machine     Past Surgical History:   Procedure Laterality Date    CHOLECYSTECTOMY      KNEE ARTHROPLASTY, PARTIAL REPLACEMENT Right 2023    Procedure: KNEE ARTHROPLASTY, PARTIAL REPLACEMENT;  Surgeon: Siddhartha Pretty MD;  Location: [facility];  Service: Robotics - Ortho;  Laterality: Right;    TOTAL KNEE ARTHROPLASTY Left 2023    Procedure: KNEE ARTHROPLASTY UNICOMPARTMENTAL;  Surgeon: Siddhartha Pretty MD;  Location: [facility];  Service: Orthopedics;  Laterality: Left;    TUBAL ABDOMINAL LIGATION       Social History     Occupational History    Not on file   Tobacco Use    Smoking status: Never    Smokeless tobacco: Never   Vaping Use    Vaping status: Never Used   Substance and Sexual Activity    Alcohol use: Not Currently     Comment: occasional    Drug use: Never    Sexual activity: Yes     Partners: Male     Birth control/protection: Tubal ligation      Social History     Social History Narrative    Not on file     Family History    Problem Relation Age of Onset    Cancer Father     Diabetes Father        Allergies:   Allergies   Allergen Reactions    Lisinopril Itching       Medications:   Home Medications:  Current Outpatient Medications on File Prior to Visit   Medication Sig    apixaban (ELIQUIS) 2.5 MG tablet tablet Take 1 tablet by mouth 2 (Two) Times a Day.    busPIRone (BUSPAR) 15 MG tablet Take 0.5 tablets by mouth 3 (Three) Times a Day As Needed (anxiety). Indications: Anxiety Disorder    ondansetron (Zofran) 4 MG tablet Take 1 tablet by mouth Every 8 (Eight) Hours As Needed for Nausea or Vomiting.    ondansetron (Zofran) 4 MG tablet Take 1 tablet by mouth Every 8 (Eight) Hours As Needed for Nausea or Vomiting.    oxyCODONE-acetaminophen (PERCOCET) 7.5-325 MG per tablet Take 1 tablet by mouth Every 8 (Eight) Hours As Needed for Severe Pain.    pantoprazole (PROTONIX) 40 MG EC tablet Take 1 tablet by mouth Daily. Take morning of surgery  Indications: Gastroesophageal Reflux Disease    triamterene-hydrochlorothiazide (DYAZIDE) 37.5-25 MG per capsule Take 1 capsule by mouth Daily. Hold morning of surgery  Indications: High Blood Pressure Disorder    buPROPion XL (WELLBUTRIN XL) 300 MG 24 hr tablet 1 tablet Daily. May take morning of surgery  Indications: Anxiety disorder (Patient not taking: Reported on 3/6/2024)    oxyCODONE (ROXICODONE) 10 MG tablet Take 1 tablet by mouth Every 4 (Four) Hours As Needed for Severe Pain. (Patient not taking: Reported on 3/6/2024)     No current facility-administered medications on file prior to visit.         ROS:  14 point review of systems was negative except as listed in the HPI     Physical Exam:   43 y.o. female  Body mass index is 36.66 kg/m²., 88 kg (194 lb)  Vitals:    03/06/24 1404   Pulse: 89   SpO2: 96%         General: Alert, cooperative, appears well and in no observable distress.   HEENT: Normocephalic, atraumatic on external visual inspection. No icterus.   CV: No significant peripheral  edema.   Respiratory: Normal respiratory effort.   Skin: Warm & well perfused; appropriate skin turgor.  Psych: Appropriate mood & affect.  Neuro: Gross sensation and motor intact in affected extremity/extremities.  Vascular: Peripheral pulses palpable in affected extremity/extremities. Calves/compartments soft and nontender, no evidence of DVT or compartment syndrome.    Ortho Exam      Left knee  Healed surgical incision  Mild edema  No warmth or erythema noted  Extension -5  Flexion limited near 65 with pain at end range   Gait antalgic - using cane as AD                Assessment:  Diagnoses and all orders for this visit:    1. Status post left partial knee replacement (Primary)       Body mass index is 36.66 kg/m².  BMI consistent with Obese Class II: 35-39.9kg/m2           Plan:  Continued concern regarding degree of flexion  Max was measured at 74 degrees on last visit - most recent measurement at 64  Continue Dynasplint TID  Continue aggressive PT. Dry needling/water therapy  Close follow up in 10 days. If no improvement, may need to consider manipulation  Refilled pain medication today  Patient encouraged to call with questions or concerns in the interim       HARRIET Singer

## 2024-03-11 ENCOUNTER — HOSPITAL ENCOUNTER (OUTPATIENT)
Dept: PHYSICAL THERAPY | Facility: HOSPITAL | Age: 44
Setting detail: THERAPIES SERIES
Discharge: HOME | End: 2024-03-11
Payer: COMMERCIAL

## 2024-03-11 PROCEDURE — 97140 MANUAL THERAPY 1/> REGIONS: CPT

## 2024-03-11 PROCEDURE — 97110 THERAPEUTIC EXERCISES: CPT

## 2024-03-11 NOTE — THERAPY TREATMENT NOTE
Outpatient Physical Therapy Ortho Treatment Note   Jazz Bullock     Patient Name: Aida Peralta  : 1980  MRN: [id number]  Today's Date: 3/11/2024      Visit Date: 2024    Visit Dx:  No diagnosis found.    Patient Active Problem List   Diagnosis    YULIA (generalized anxiety disorder)    History of seizure    Hypertension    Hypothyroidism    Primary osteoarthritis of both knees    Morbid obesity    Plica of knee, left    Adjustment disorder    Status post right partial knee replacement    Status post left partial knee replacement        Past Medical History:   Diagnosis Date    CTS (carpal tunnel syndrome)     GERD (gastroesophageal reflux disease)     Hypertension     Migraines     Plica of knee, left 10/22/2021    Primary osteoarthritis of both knees 10/22/2021    Seizure     one in  2018, no meds    Sleep apnea     no machine        Past Surgical History:   Procedure Laterality Date    CHOLECYSTECTOMY      KNEE ARTHROPLASTY, PARTIAL REPLACEMENT Right 2023    Procedure: KNEE ARTHROPLASTY, PARTIAL REPLACEMENT;  Surgeon: Siddhartha Pretty MD;  Location: [facility];  Service: Robotics - Ortho;  Laterality: Right;    TOTAL KNEE ARTHROPLASTY Left 2023    Procedure: KNEE ARTHROPLASTY UNICOMPARTMENTAL;  Surgeon: Siddhartha Pretty MD;  Location: [facility];  Service: Orthopedics;  Laterality: Left;    TUBAL ABDOMINAL LIGATION          PT Ortho       Row Name 24 1500       Subjective    Subjective Comments pt reports pain around knee cap mostly at night, otherwise her pain has been minimal; pt to attend therapy today then return to ortho 3-13-24 to see if going to do manipulation  -       Left Lower Ext    Lt Knee Extension/Flexion AROM 64 degrees flexion, seated post stretches  -MH    Lt Knee Extension/Flexion PROM 70 degrees flex, seated post stretches  -              User Key  (r) = Recorded By, (t) = Taken By, (c) = Cosigned By      Initials Name Provider Type    Edi Moncada  ALESIA Gonzales Physical Therapist Assistant                                 PT Assessment/Plan       Row Name 03/11/24 1629          PT Assessment    Assessment Comments pt with much improved pain except for patella area pain that occurs mostly at night; continues with very limited tolerance to passive stretching but tolerating ex's well; minimal increase in knee flexion A/PROM since last week  -        PT Plan    PT Plan Comments Pt to follow up with ortho Wed, 3-13-24 regarding possible manipulation; will continue as advised  -               User Key  (r) = Recorded By, (t) = Taken By, (c) = Cosigned By      Initials Name Provider Type     Edi Winchester PTA Physical Therapist Assistant                     Modalities       Row Name 03/11/24 1500             Moist Heat    Location Anterior and posterior L knee/LE with pt supine. (2 cervical MHP)  -      PT Moist Heat Minutes 12  -[facility] Prior to Rx Yes  -         Ice    Patient denies application of Ice Yes  -      Patient reports will apply ice at home to involved area Yes  -                User Key  (r) = Recorded By, (t) = Taken By, (c) = Cosigned By      Initials Name Provider Type     Edi Winchester PTA Physical Therapist Assistant                   OP Exercises       Row Name 03/11/24 1636 03/11/24 1500          Precautions    Existing Precautions/Restrictions -- no known precautions/restrictions  -        Subjective    Subjective Comments -- pt reports pain around knee cap mostly at night, otherwise her pain has been minimal; pt to attend therapy today then return to ortho 3-13-24 to see if going to do manipulation  -        Total Minutes    21651 - PT Therapeutic Exercise Minutes 45  - --     12040 - PT Manual Therapy Minutes 10  - --        Exercise 1    Exercise Name 1 -- Heelslides with sheet  -     Cueing 1 -- Verbal;Tactile;Demo  [facility]     Time 1 -- 7 minutes  -        Exercise 2    Exercise Name 2 -- Wallslides  -      Cueing 2 -- Verbal;Tactile;Demo  -     Time 2 -- 7 minutes  -        Exercise 3    Exercise Name 3 -- Bike- airdyne seat 2  -     Cueing 3 -- Verbal;Tactile;Demo  -     Time 3 -- 9 minutes  -        Exercise 4    Exercise Name 4 -- supine HS stretch with sheet/supine HS stretch with adduction  -     Cueing 4 -- Verbal;Tactile;Demo  -     Reps 4 -- 10/10  -     Time 4 -- 10 sec  -        Exercise 5    Exercise Name 5 -- QS over single towel roll  -     Cueing 5 -- Verbal;Tactile;Demo  -     Sets 5 -- [place] STIM (10/10)  -     Time 5 -- 10 min  -        Exercise 6    Exercise Name 6 -- SAQ  -        Exercise 7    Exercise Name 7 -- S/L hip abduction  -     Cueing 7 -- Verbal;Tactile;Demo  -     Reps 7 -- 20  -MH        Exercise 8    Exercise Name 8 -- SLR  -     Cueing 8 -- Verbal;Tactile;Demo  -     Reps 8 -- 20  -     Time 8 -- --  -MH        Exercise 9    Exercise Name 9 -- Prone hip extension  -     Cueing 9 -- Verbal  -     Reps 9 -- 20  -        Exercise 10    Exercise Name 10 -- S/L hip adduction  -     Cueing 10 -- Verbal;Tactile;Demo  -     Reps 10 -- 20  -MH        Exercise 11    Exercise Name 11 -- Heel prop  -     Time 11 -- HEP  -        Exercise 12    Exercise Name 12 -- LAQ  -     Cueing 12 -- Verbal;Tactile;Demo  -     Reps 12 -- 20  -        Exercise 13    Exercise Name 13 -- TKE  -     Cueing 13 -- Verbal  -     Reps 13 -- 20  -     Time 13 -- black  -        Exercise 14    Exercise Name 14 -- standing calf raises  -     Cueing 14 -- Verbal;Tactile;Demo  -     Reps 14 -- 20  -        Exercise 15    Exercise Name 15 -- standing Hamstring curl  -     Cueing 15 -- Verbal;Tactile;Demo  -     Reps 15 -- 20  -        Exercise 16    Exercise Name 16 -- Forward step ups on 6 inch step  -     Cueing 16 -- Verbal;Tactile;Demo  -     Reps 16 -- 20 each  -               User Key  (r) = Recorded By, (t) = Taken By, (c) =  Cosigned By      Initials Name Provider Type    Edi Moncada PTA Physical Therapist Assistant                             Manual Rx (Last 36 Hours)       Manual Treatments       Row Name 03/11/24 1636             Total Minutes    62671 - PT Manual Therapy Minutes 10  -MH                User Key  (r) = Recorded By, (t) = Taken By, (c) = Cosigned By      Initials Name Provider Type    Edi Moncada PTA Physical Therapist Assistant                        Therapy Education  Given: HEP, Symptoms/condition management  Program: Reinforced  How Provided: Verbal, Demonstration  Provided to: Patient  Level of Understanding: Teach back education performed, Verbalized, Demonstrated              Time Calculation:   Start Time: 1500  Stop Time: 1626  Time Calculation (min): 86 min  Timed Charges  21287 - PT Therapeutic Exercise Minutes: 45  22868 - PT Manual Therapy Minutes: 10  Untimed Charges  PT Moist Heat Minutes: 12  Total Minutes  Timed Charges Total Minutes: 55  Untimed Charges Total Minutes: 12   Total Minutes: 55  Therapy Charges for Today       Code Description Service Date Service Provider Modifiers Qty    29322752114 HC PT THER PROC EA 15 MIN 3/11/2024 Edi Winchester PTA GP 3    62684746761 HC PT MANUAL THERAPY EA 15 MIN 3/11/2024 Edi Winchester PTA GP 1                      Edi Winchester PTA  3/11/2024

## 2024-03-13 ENCOUNTER — OFFICE VISIT (OUTPATIENT)
Dept: ORTHOPEDIC SURGERY | Facility: CLINIC | Age: 44
End: 2024-03-13
Payer: COMMERCIAL

## 2024-03-13 VITALS — BODY MASS INDEX: 36.63 KG/M2 | HEIGHT: 61 IN | WEIGHT: 194 LBS | OXYGEN SATURATION: 98 % | RESPIRATION RATE: 20 BRPM

## 2024-03-13 DIAGNOSIS — Z96.652 STATUS POST LEFT PARTIAL KNEE REPLACEMENT: Primary | ICD-10-CM

## 2024-03-13 PROCEDURE — 99024 POSTOP FOLLOW-UP VISIT: CPT | Performed by: NURSE PRACTITIONER

## 2024-03-13 NOTE — PROGRESS NOTES
[facility]  Post Operative Visit      Patient Name: Aida Peralta  : 1980  Primary Care Physician: Aida Sheldon APRN        Chief Complaint:  S/P left partial knee replacement with Dr. Pretty on 23.    HPI:   Aida Peralta is a 43 y.o. who presents today for postoperative evaluation.     No significant changes since last visit. Limited flexion. Max flexion measured at 74. Using dynasplint TID for 30 minutes. Going to PT 2 days per week. Pain somewhat increased over last several days as well. No fevers.       Past Medical/Surgical, Social and Family History:  I have reviewed and/or updated pertinent history as noted in the medical record including:  Past Medical History:   Diagnosis Date    CTS (carpal tunnel syndrome)     GERD (gastroesophageal reflux disease)     Hypertension     Migraines     Plica of knee, left 10/22/2021    Primary osteoarthritis of both knees 10/22/2021    Seizure     one in  2018, no meds    Sleep apnea     no machine     Past Surgical History:   Procedure Laterality Date    CHOLECYSTECTOMY      KNEE ARTHROPLASTY, PARTIAL REPLACEMENT Right 2023    Procedure: KNEE ARTHROPLASTY, PARTIAL REPLACEMENT;  Surgeon: Siddhartha Pretty MD;  Location: [facility];  Service: Robotics - Ortho;  Laterality: Right;    TOTAL KNEE ARTHROPLASTY Left 2023    Procedure: KNEE ARTHROPLASTY UNICOMPARTMENTAL;  Surgeon: Siddhartha Pretty MD;  Location: [facility];  Service: Orthopedics;  Laterality: Left;    TUBAL ABDOMINAL LIGATION       Social History     Occupational History    Not on file   Tobacco Use    Smoking status: Never    Smokeless tobacco: Never   Vaping Use    Vaping status: Never Used   Substance and Sexual Activity    Alcohol use: Not Currently     Comment: occasional    Drug use: Never    Sexual activity: Yes     Partners: Male     Birth control/protection: Tubal ligation      Social History     Social History Narrative    Not on file     Family History    Problem Relation Age of Onset    Cancer Father     Diabetes Father        Allergies:   Allergies   Allergen Reactions    Lisinopril Itching       Medications:   Home Medications:  Current Outpatient Medications on File Prior to Visit   Medication Sig    busPIRone (BUSPAR) 15 MG tablet Take 0.5 tablets by mouth 3 (Three) Times a Day As Needed (anxiety). Indications: Anxiety Disorder    meloxicam (MOBIC) 15 MG tablet Take 1 tablet by mouth Daily. 1 PO Daily with food.    oxyCODONE-acetaminophen (PERCOCET) 7.5-325 MG per tablet Take 1 tablet by mouth Every 8 (Eight) Hours As Needed for Severe Pain.    pantoprazole (PROTONIX) 40 MG EC tablet Take 1 tablet by mouth Daily. Take morning of surgery  Indications: Gastroesophageal Reflux Disease    triamterene-hydrochlorothiazide (DYAZIDE) 37.5-25 MG per capsule Take 1 capsule by mouth Daily. Hold morning of surgery  Indications: High Blood Pressure Disorder    ondansetron (Zofran) 4 MG tablet Take 1 tablet by mouth Every 8 (Eight) Hours As Needed for Nausea or Vomiting. (Patient not taking: Reported on 3/13/2024)    ondansetron (Zofran) 4 MG tablet Take 1 tablet by mouth Every 8 (Eight) Hours As Needed for Nausea or Vomiting. (Patient not taking: Reported on 3/13/2024)     No current facility-administered medications on file prior to visit.         ROS:  14 point review of systems was negative except as listed in the HPI     Physical Exam:   43 y.o. female  Body mass index is 36.66 kg/m²., 88 kg (194 lb)  Vitals:    03/13/24 1301   Resp: 20   SpO2: 98%         General: Alert, cooperative, appears well and in no observable distress.   HEENT: Normocephalic, atraumatic on external visual inspection. No icterus.   CV: No significant peripheral edema.   Respiratory: Normal respiratory effort.   Skin: Warm & well perfused; appropriate skin turgor.  Psych: Appropriate mood & affect.  Neuro: Gross sensation and motor intact in affected extremity/extremities.  Vascular: Peripheral  pulses palpable in affected extremity/extremities. Calves/compartments soft and nontender, no evidence of DVT or compartment syndrome.    Ortho Exam          Investigations:  No new x-rays were needed today.              Assessment:  Diagnoses and all orders for this visit:    1. Status post left partial knee replacement (Primary)       Body mass index is 36.66 kg/m².  BMI consistent with Obese Class II: 35-39.9kg/m2           Plan:  Continue physical therapy and ADLs as tolerated.    Continue Dynasplint  Reviewed the following treatment options with the patient:   Continue aggressive PT and splinting  Manipulation  Surgical removal of scar tissue adhesions  She is very interested in moving forward with knee manipulation. I have discussed with her that this procedure results in pain afterwards and requires aggressive, daily PT for 2 weeks or more  I also called and personally Discussed case with Dr. Pretty.   Notified Dr. Pretty's office staff of the above and requested an appointment with him in [place] - patient aware  Will see her back in 2-3 weeks pending the above        Patient encouraged to call with questions or concerns in the interim       HARRIET Singer

## 2024-03-14 ENCOUNTER — APPOINTMENT (OUTPATIENT)
Dept: PHYSICAL THERAPY | Facility: HOSPITAL | Age: 44
End: 2024-03-14
Payer: COMMERCIAL

## 2024-03-14 ENCOUNTER — HOSPITAL ENCOUNTER (OUTPATIENT)
Dept: PHYSICAL THERAPY | Facility: HOSPITAL | Age: 44
Setting detail: THERAPIES SERIES
Discharge: HOME | End: 2024-03-14
Payer: COMMERCIAL

## 2024-03-14 DIAGNOSIS — Z96.652 STATUS POST LEFT PARTIAL KNEE REPLACEMENT: Primary | ICD-10-CM

## 2024-03-14 PROCEDURE — 97110 THERAPEUTIC EXERCISES: CPT

## 2024-03-14 PROCEDURE — 97140 MANUAL THERAPY 1/> REGIONS: CPT

## 2024-03-14 NOTE — THERAPY TREATMENT NOTE
"  Outpatient Physical Therapy Ortho Treatment Note   Jazz Bullock     Patient Name: Aida Peralta  : 1980  MRN: [id number]  Today's Date: 3/14/2024      Visit Date: 2024    Visit Dx:    ICD-10-CM ICD-9-CM   1. Status post left partial knee replacement  Z96.652 V43.65       Patient Active Problem List   Diagnosis    YULIA (generalized anxiety disorder)    History of seizure    Hypertension    Hypothyroidism    Primary osteoarthritis of both knees    Morbid obesity    Plica of knee, left    Adjustment disorder    Status post right partial knee replacement    Status post left partial knee replacement        Past Medical History:   Diagnosis Date    CTS (carpal tunnel syndrome)     GERD (gastroesophageal reflux disease)     Hypertension     Migraines     Plica of knee, left 10/22/2021    Primary osteoarthritis of both knees 10/22/2021    Seizure     one in  , no meds    Sleep apnea     no machine        Past Surgical History:   Procedure Laterality Date    CHOLECYSTECTOMY      KNEE ARTHROPLASTY, PARTIAL REPLACEMENT Right 2023    Procedure: KNEE ARTHROPLASTY, PARTIAL REPLACEMENT;  Surgeon: Siddhartha Pretty MD;  Location: [facility];  Service: Robotics - Ortho;  Laterality: Right;    TOTAL KNEE ARTHROPLASTY Left 2023    Procedure: KNEE ARTHROPLASTY UNICOMPARTMENTAL;  Surgeon: Siddhartha Pretty MD;  Location: [facility];  Service: Orthopedics;  Laterality: Left;    TUBAL ABDOMINAL LIGATION          PT Ortho       Row Name 24 1500       Subjective    Subjective Comments Pt reports she has been having more knee cap pain recently; \"I had to take a pain pill last night and 2 already today.\" Decreased pain in hamstring now. Pt saw Ortho APRN yesterday and \"she was going back and forth whether I needed a manipulation and she asked me what I want and I told her I want it done, so she is supposed to talk to Dr. Pretty to see when it could be scheduled and call me back but I haven't heard " "anything yet.\"  \"I want it done as soon as possible.\" No dry needling needed today per pt. \"I am still doing the deysi splint and I turned it up another notch.\"  -LN       Subjective Pain    Able to rate subjective pain? --  -LN              User Key  (r) = Recorded By, (t) = Taken By, (c) = Cosigned By      Initials Name Provider Type    Gloria Wolf, PT Physical Therapist                                 PT Assessment/Plan       Row Name 03/14/24 1500          PT Assessment    Assessment Comments Pt with decreased pain in Hamstring muscle but now reporting increased patellar pain , which is most likely due to scar tissue/tightness in knee. Pt still with significant decrease in L knee flexion and decreased tolerance to passive stretching; do feel pt would benefit from getting a L knee manipulation, since she has seemed to plateau on any improvement in flexion at this time; pt is agreement with this and hopes to get it scheduled ASAP so she can RTW soon. (pt is now awaiting to hear rosas MEJIA about getting a manipulation scheduled). She does well with all other exercises except knee flexion exercises. Strength is improved in R hip and knee. Pt is ambulating well with SPC and short distances without cane but with minimal to moderate antalgic gait on L with decreased knee ROM and decreased heel to toe gait noted.  -LN        PT Plan    Predicted Duration of Therapy Intervention (PT) See below  -LN     PT Plan Comments Pt to call PT after she finds out when knee manipulation is going to be scheduled and will schedule further PT accordingly; plan on waiting until after manipulation if it is done soon, because at this time, pt only has 5 more approved visits from insurance.  -LN               User Key  (r) = Recorded By, (t) = Taken By, (c) = Cosigned By      Initials Name Provider Type    Gloria Wolf, PT Physical Therapist                     Modalities       Row Name 03/14/24 1500             " "Moist Heat    MH Applied Yes  -LN      Location Anterior and posterior L knee/LE with pt supine. (2 cervical MHP)  -LN      PT Moist Heat Minutes 15  -LN      MH Prior to Rx Yes  -LN         Ice    Patient denies application of Ice Yes  -LN      Patient reports will apply ice at home to involved area Yes  -LN                User Key  (r) = Recorded By, (t) = Taken By, (c) = Cosigned By      Initials Name Provider Type    LN Gloria Kan, PT Physical Therapist                   OP Exercises       Row Name 03/14/24 1500             Precautions    Existing Precautions/Restrictions no known precautions/restrictions  -LN         Subjective    Subjective Comments Pt reports she has been having more knee cap pain recently; \"I had to take a pain pill last night and 2 already today.\" Decreased pain in hamstring now. Pt saw Ortho APRN yesterday and \"she was going back and forth whether I needed a manipulation and she asked me what I want and I told her I want it done, so she is supposed to talk to Dr. Pretty to see when it could be scheduled and call me back but I haven't heard anything yet.\"  \"I want it done as soon as possible.\" No dry needling needed today per pt. \"I am still doing the deysi splint and I turned it up another notch.\"  -LN         Subjective Pain    Able to rate subjective pain? --  -LN         Total Minutes    22011 - PT Therapeutic Exercise Minutes 45  -LN      08231 - PT Manual Therapy Minutes 12  -LN         Exercise 1    Exercise Name 1 Heelslides with sheet  -LN      Cueing 1 Verbal;Tactile;Demo  -LN      Time 1 7 minutes  -LN         Exercise 2    Exercise Name 2 Wallslides  -LN      Cueing 2 Verbal;Tactile;Demo  -LN      Time 2 7 minutes  -LN         Exercise 3    Exercise Name 3 Bike- airdyne seat 2  -LN      Cueing 3 Verbal;Tactile;Demo  -LN      Time 3 9 minutes  -LN         Exercise 4    Exercise Name 4 supine HS stretch with sheet/supine HS stretch with adduction  -LN      Cueing 4 " Verbal;Tactile;Demo  -LN      Reps 4 10/10  -LN      Time 4 10 sec  -LN         Exercise 5    Exercise Name 5 QS over single towel roll  -LN      Cueing 5 Verbal;Tactile;Demo  -LN      Sets 5 [place] STIM (10/10)  -LN      Time 5 10 min  -LN         Exercise 6    Exercise Name 6 SAQ  -LN         Exercise 7    Exercise Name 7 S/L hip abduction  -LN      Cueing 7 Verbal;Tactile;Demo  -LN      Reps 7 20  -LN         Exercise 8    Exercise Name 8 SLR  -LN      Cueing 8 Verbal;Tactile;Demo  -LN      Reps 8 20  -LN         Exercise 9    Exercise Name 9 Prone hip extension  -LN      Cueing 9 Verbal  -LN      Reps 9 20  -LN         Exercise 10    Exercise Name 10 S/L hip adduction  -LN      Cueing 10 Verbal;Tactile;Demo  -LN      Reps 10 20  -LN         Exercise 11    Exercise Name 11 Heel prop  -LN      Time 11 HEP  -LN         Exercise 12    Exercise Name 12 LAQ  -LN      Cueing 12 Verbal;Tactile;Demo  -LN      Reps 12 20  -LN         Exercise 13    Exercise Name 13 TKE  -LN      Cueing 13 Verbal  -LN      Reps 13 25  -LN      Time 13 black  -LN         Exercise 14    Exercise Name 14 standing calf raises  -LN      Cueing 14 Verbal;Tactile;Demo  -LN      Reps 14 25  -LN         Exercise 15    Exercise Name 15 standing Hamstring curl  -LN      Cueing 15 Verbal;Tactile;Demo  -LN      Reps 15 7  -LN      Additional Comments pain in back of leg limited pt today  -LN         Exercise 16    Exercise Name 16 Forward step ups on 6 inch step  -LN      Cueing 16 Verbal;Tactile;Demo  -LN      Reps 16 25 each  -LN                User Key  (r) = Recorded By, (t) = Taken By, (c) = Cosigned By      Initials Name Provider Type    LN Gloria Kan, PT Physical Therapist                             Manual Rx (Last 36 Hours)       Manual Treatments       Row Name 03/14/24 1500             Total Minutes    41194 - PT Manual Therapy Minutes 12  -LN         Manual Rx 2    Manual Rx 2 Location Seated passive left knee flexion  -LN       Manual Rx 2 Duration 10 x 10 sec  -LN         Manual Rx 3    Manual Rx 3 Location Posterior tibial mobs  -LN      Manual Rx 3 Duration 3 x 5 osc gentle  -LN                User Key  (r) = Recorded By, (t) = Taken By, (c) = Cosigned By      Initials Name Provider Type    Gloria Wolf, PT Physical Therapist                        Therapy Education  Education Details: Pt to continue with HEP and continue using dynasplint for knee flexion as tolerated. Pt to use MH/CP PRN.  Given: HEP, Symptoms/condition management, Pain management, Edema management  Program: Reinforced, Progressed  How Provided: Verbal, Demonstration  Provided to: Patient  Level of Understanding: Teach back education performed, Verbalized, Demonstrated              Time Calculation:   Start Time: 1530  Stop Time: 1650  Time Calculation (min): 80 min  Timed Charges  84451 - PT Therapeutic Exercise Minutes: 45  87371 - PT Manual Therapy Minutes: 12  Untimed Charges  PT Moist Heat Minutes: 15  Total Minutes  Timed Charges Total Minutes: 57  Untimed Charges Total Minutes: 15   Total Minutes: 72  Therapy Charges for Today       Code Description Service Date Service Provider Modifiers Qty    08723347202 HC PT THER PROC EA 15 MIN 3/14/2024 Gloria Kan, PT GP 3    63085446782 HC PT MANUAL THERAPY EA 15 MIN 3/14/2024 Gloria Kan, PT GP 1                      Gloria Kan, PT  3/14/2024

## 2024-03-27 ENCOUNTER — PATIENT MESSAGE (OUTPATIENT)
Dept: ORTHOPEDIC SURGERY | Facility: CLINIC | Age: 44
End: 2024-03-27
Payer: COMMERCIAL

## 2024-03-27 ENCOUNTER — HOSPITAL ENCOUNTER (OUTPATIENT)
Dept: PHYSICAL THERAPY | Facility: HOSPITAL | Age: 44
Setting detail: THERAPIES SERIES
Discharge: HOME | End: 2024-03-27
Payer: COMMERCIAL

## 2024-03-27 DIAGNOSIS — Z96.652 STATUS POST LEFT PARTIAL KNEE REPLACEMENT: Primary | ICD-10-CM

## 2024-03-27 PROCEDURE — 97110 THERAPEUTIC EXERCISES: CPT

## 2024-03-27 NOTE — THERAPY TREATMENT NOTE
"  Outpatient Physical Therapy Ortho Treatment Note   Jazz Bullock     Patient Name: Aida Peralta  : 1980  MRN: [id number]  Today's Date: 3/27/2024      Visit Date: 2024    Visit Dx:    ICD-10-CM ICD-9-CM   1. Status post left partial knee replacement  Z96.652 V43.65       Patient Active Problem List   Diagnosis    YULIA (generalized anxiety disorder)    History of seizure    Hypertension    Hypothyroidism    Primary osteoarthritis of both knees    Morbid obesity    Plica of knee, left    Adjustment disorder    Status post right partial knee replacement    Status post left partial knee replacement        Past Medical History:   Diagnosis Date    CTS (carpal tunnel syndrome)     GERD (gastroesophageal reflux disease)     Hypertension     Migraines     Plica of knee, left 10/22/2021    Primary osteoarthritis of both knees 10/22/2021    Seizure     one in  , no meds    Sleep apnea     no machine        Past Surgical History:   Procedure Laterality Date    CHOLECYSTECTOMY      KNEE ARTHROPLASTY, PARTIAL REPLACEMENT Right 2023    Procedure: KNEE ARTHROPLASTY, PARTIAL REPLACEMENT;  Surgeon: Siddhartha Pretty MD;  Location: [facility];  Service: Robotics - Ortho;  Laterality: Right;    TOTAL KNEE ARTHROPLASTY Left 2023    Procedure: KNEE ARTHROPLASTY UNICOMPARTMENTAL;  Surgeon: Siddhartha Pretty MD;  Location: [facility];  Service: Orthopedics;  Laterality: Left;    TUBAL ABDOMINAL LIGATION          PT Ortho       Row Name 24 1400       Subjective    Subjective Comments Pt reports that her knee has been doing okay but reports pain in knee, jett along medial joint line. She continues to report decreased pain and tightness in back of leg. \"I have an appointment to see Dr. Pretty next week and I have to go to [place] to see him; his APRN said she talked to him and he can do the manipulation or he can go back into my knee and remove the scar tissue; but I just want the manipulation.\" \"I " "hope it gets scheduled soon.\" \"I returned that splint because I didn't want to pay for another month if I am getting a manipulation and I don't think it was helping much.\"  \"I tried to walk the dog the other day and I couldn't go very far at all.\" -LN       Subjective Pain    Subjective Pain Comment \"I did walk back and check on the chickens and I don't know what I did but I moved a certain way and had a bad pain in my knee that I felt all the way up to my shoulder.\"  -LN       Left Lower Ext    Lt Knee Extension/Flexion AROM 74 degrees seated post-stretch  -LN    Lt Knee Extension/Flexion PROM 78 degrees seated flexion post-stretch  -LN              User Key  (r) = Recorded By, (t) = Taken By, (c) = Cosigned By      Initials Name Provider Type    Gloria Wolf, PT Physical Therapist                                 PT Assessment/Plan       Row Name 03/27/24 1400          PT Assessment    Assessment Comments Pt continues with decreased pain in HS and back of leg but knee pain persists jett along medial joint line.  L knee flexion improved today but still very limited and tight and pt continues with decreased tolerance to passive stretching- pt resists and lifts hip off table and leans back due to extreme pain with the stretching. Do feel pt needs a knee manipulation in order to get WFL-WNL knee ROM and to be able to have a normal gait and be able to return to work and all her normal activities. Pt is now ambulating without any AD but with minimal to moderate antalgic gait and she can do stairs but has to take 1 step at a time. Pt also with limited walking tolerance; she walked her dog the other day and could not go very far & has limited walking on uneven ground. Her limited knee ROM definitely affects her quality of life.  -LN        PT Plan    Predicted Duration of Therapy Intervention (PT) See below  -LN     PT Plan Comments Pt to call PT after she sees MD on 4/3/24 and finds out when knee manipulation " "will be scheduled and will then schedule further PT visits accordingly. Pt will need to be seen daily for at least 2 weeks after manipulation. Pt to continue with HEP daily and use MH/CP PRN.  -LN               User Key  (r) = Recorded By, (t) = Taken By, (c) = Cosigned By      Initials Name Provider Type    Gloria Wolf, PT Physical Therapist                     Modalities       Row Name 03/27/24 1300             Precautions    Existing Precautions/Restrictions no known precautions/restrictions  -LN         Moist Heat    MH Applied Yes  -LN      Location Anterior and posterior L knee/LE with pt supine. (2 cervical MHP)  -LN      PT Moist Heat Minutes 12  -LN      MH Prior to Rx Yes  -LN         Ice    Ice Applied Yes  -LN      Location anterior and posterior L knee with patient supine.  -LN      PT Ice Rx Minutes 10  -LN      Ice S/P Rx Yes  -LN      Patient reports will apply ice at home to involved area Yes  -LN                User Key  (r) = Recorded By, (t) = Taken By, (c) = Cosigned By      Initials Name Provider Type    Gloria Wolf, PT Physical Therapist                   OP Exercises       Row Name 03/27/24 1400 03/27/24 1300          Precautions    Existing Precautions/Restrictions -- no known precautions/restrictions  -LN        Subjective    Subjective Comments Pt reports that her knee has been doing okay but reports pain in knee, jett along medial joint line. She continues to report decreased pain and tightness in back of leg. \"I have an appointment to see Dr. Pretty next week and I have to go to [place] to see him; his APRN said she talked to him and he can do the manipulation or he can go back into my knee and remove the scar tissue; but I just want the manipulation.\" \"I hope it gets scheduled soon.\" \"I returned that splint because I didn't want to pay for another month if I am getting a manipulation and I don't think it was helping much.\"  -LN --        Subjective Pain    " "Subjective Pain Comment \"I did walk back and check on the chickens and I don't know what I did but I moved a certain way and had a bad pain in my knee that I felt all the way up to my shoulder.\"  -LN --        Total Minutes    87520 - PT Therapeutic Exercise Minutes -- 45  -LN        Exercise 1    Exercise Name 1 -- Heelslides with sheet  -LN     Cueing 1 -- Verbal;Tactile;Demo  -LN     Time 1 -- 7 minutes  -LN        Exercise 2    Exercise Name 2 -- Wallslides  -LN     Cueing 2 -- Verbal;Tactile;Demo  -LN     Time 2 -- 7 minutes  -LN        Exercise 3    Exercise Name 3 -- Bike- airdyne seat 2  -LN     Cueing 3 -- Verbal;Tactile;Demo  -LN     Time 3 -- 10 minutes  -LN        Exercise 4    Exercise Name 4 -- supine HS stretch with sheet/supine HS stretch with adduction  -LN     Cueing 4 -- Verbal;Tactile;Demo  -LN     Reps 4 -- 10/10  -LN     Time 4 -- 10 sec  -LN        Exercise 5    Exercise Name 5 -- QS over single towel roll  -LN     Cueing 5 -- Verbal;Tactile;Demo  -LN     Sets 5 -- [place] STIM (10/10)  -LN     Time 5 -- 10 min  -LN        Exercise 6    Exercise Name 6 -- SAQ  -LN        Exercise 7    Exercise Name 7 -- S/L hip abduction  -LN     Cueing 7 -- Verbal;Tactile;Demo  -LN     Reps 7 -- 25  -LN        Exercise 8    Exercise Name 8 -- SLR  -LN     Cueing 8 -- Verbal;Tactile;Demo  -LN     Reps 8 -- 25  -LN        Exercise 9    Exercise Name 9 -- Prone hip extension  -LN     Cueing 9 -- Verbal  -LN     Reps 9 -- 25  -LN        Exercise 10    Exercise Name 10 -- S/L hip adduction  -LN     Cueing 10 -- Verbal;Tactile;Demo  -LN     Reps 10 -- 25  -LN        Exercise 11    Exercise Name 11 -- Heel prop  -LN     Time 11 -- HEP  -LN        Exercise 12    Exercise Name 12 -- LAQ  -LN     Cueing 12 -- Verbal;Tactile;Demo  -LN     Reps 12 -- 25  -LN        Exercise 13    Exercise Name 13 -- TKE  -LN     Cueing 13 -- Verbal  -LN     Reps 13 -- 25  -LN     Time 13 -- silver  -LN        Exercise 14    Exercise Name " 14 -- standing calf raises  -LN     Cueing 14 -- Verbal;Tactile;Demo  -LN     Reps 14 -- 25  -LN        Exercise 15    Exercise Name 15 -- standing Hamstring curl  -LN     Cueing 15 -- Verbal;Tactile;Demo  -LN     Reps 15 -- 10  -LN        Exercise 16    Exercise Name 16 -- Forward step ups on 6 inch step  -LN     Cueing 16 -- Verbal;Tactile;Demo  -LN     Reps 16 -- 25 each  -LN               User Key  (r) = Recorded By, (t) = Taken By, (c) = Cosigned By      Initials Name Provider Type    Gloria Wolf, PT Physical Therapist                                     Therapy Education  Education Details: Pt to continue with HEP and use MH/CP PRN. Pt to keep working on knee flexion at home as tolerated. Pt to increase TB to silver with TKE.  Given: HEP, Symptoms/condition management, Pain management  Program: Reinforced, Progressed  How Provided: Verbal, Demonstration  Provided to: Patient  Level of Understanding: Teach back education performed, Verbalized, Demonstrated              Time Calculation:   Start Time: 1400  Stop Time: 1520  Time Calculation (min): 80 min  Timed Charges  59113 - PT Therapeutic Exercise Minutes: 45  Untimed Charges  PT Moist Heat Minutes: 12  PT Ice Rx Minutes: 10  Total Minutes  Timed Charges Total Minutes: 45  Untimed Charges Total Minutes: 22   Total Minutes: 67  Therapy Charges for Today       Code Description Service Date Service Provider Modifiers Qty    13818856908 HC PT THER PROC EA 15 MIN 3/27/2024 Gloria Kan, PT GP 3                      Gloria Kan, PT  3/27/2024

## 2024-04-03 DIAGNOSIS — M17.0 BILATERAL PRIMARY OSTEOARTHRITIS OF KNEE: ICD-10-CM

## 2024-04-03 RX ORDER — OXYCODONE AND ACETAMINOPHEN 7.5; 325 MG/1; MG/1
1 TABLET ORAL EVERY 8 HOURS PRN
Qty: 40 TABLET | Refills: 0 | Status: SHIPPED | OUTPATIENT
Start: 2024-04-03

## 2024-04-10 ENCOUNTER — HOSPITAL ENCOUNTER (OUTPATIENT)
Dept: PHYSICAL THERAPY | Facility: HOSPITAL | Age: 44
Setting detail: THERAPIES SERIES
Discharge: HOME | End: 2024-04-10
Payer: COMMERCIAL

## 2024-04-10 DIAGNOSIS — Z96.652 STATUS POST LEFT PARTIAL KNEE REPLACEMENT: Primary | ICD-10-CM

## 2024-04-10 PROCEDURE — 97110 THERAPEUTIC EXERCISES: CPT | Performed by: PHYSICAL THERAPIST

## 2024-04-10 PROCEDURE — 97140 MANUAL THERAPY 1/> REGIONS: CPT | Performed by: PHYSICAL THERAPIST

## 2024-04-10 NOTE — THERAPY RE-EVALUATION
Outpatient Physical Therapy Ortho Re-Evaluation   Jazz Bullock     Patient Name: Aida Peralta  : 1980  MRN: [id number]  Today's Date: 4/10/2024      Visit Date: 04/10/2024    Patient Active Problem List   Diagnosis    YULIA (generalized anxiety disorder)    History of seizure    Hypertension    Hypothyroidism    Primary osteoarthritis of both knees    Morbid obesity    Plica of knee, left    Adjustment disorder    Status post right partial knee replacement    Status post left partial knee replacement        Past Medical History:   Diagnosis Date    CTS (carpal tunnel syndrome)     GERD (gastroesophageal reflux disease)     Hypertension     Migraines     Plica of knee, left 10/22/2021    Primary osteoarthritis of both knees 10/22/2021    Seizure     one in  2018, no meds    Sleep apnea     no machine        Past Surgical History:   Procedure Laterality Date    CHOLECYSTECTOMY      KNEE ARTHROPLASTY, PARTIAL REPLACEMENT Right 2023    Procedure: KNEE ARTHROPLASTY, PARTIAL REPLACEMENT;  Surgeon: Siddhartha Pretty MD;  Location: [facility];  Service: Robotics - Ortho;  Laterality: Right;    TOTAL KNEE ARTHROPLASTY Left 2023    Procedure: KNEE ARTHROPLASTY UNICOMPARTMENTAL;  Surgeon: Siddhartha Pretty MD;  Location: [facility];  Service: Orthopedics;  Laterality: Left;    TUBAL ABDOMINAL LIGATION         Visit Dx:     ICD-10-CM ICD-9-CM   1. Status post left partial knee replacement  Z96.652 V43.65              PT Ortho       Row Name 04/10/24 0800       Subjective    Subjective Comments Patient is s/p left knee manipulation 4-10.  She reports that MD said she achieved 130 degrees in procedure.  Patient reports that her knee is feeling better and rates her pain level at 5/10.  She used rolling walker for mobility yesterday but feels safe using cane today.  She reports that typically her pain is at medial knee, but she is having more posterior aspect knee pain today.  -SP        Posture/Observations    Observations Edema  -SP    Posture/Observations Comments Patient with mild to moderate edema left knee; incision well healed; she presents with knee wrapped in ace and is using a cane  -SP       Knee Palpation    Patella Left:;Tender  -SP    Medial Joint Line Left:;Tender;Swollen  -SP    Posterior Joint Line Left:;Tender;Swollen;Guarded/taut  -SP       Patellar Accessory Motions    Superior glide Left:;Hypomobile  -SP    Inferior glide Left:;Hypomobile  -SP    Medial glide Left:;Hypomobile  -SP    Lateral glide Left:;Hypomobile  -SP       Left Lower Ext    Lt Knee Extension/Flexion AROM 18 to 50 degrees AROM prior to treatment  -SP    Lt Knee Extension/Flexion PROM 9 to 92 degrees PROM after exercise  -SP       MMT Right Lower Ext    Rt Hip Flexion MMT, Gross Movement (4/5) good  -SP    Rt Hip Extension MMT, Gross Movement (4/5) good  -SP    Rt Hip ABduction MMT, Gross Movement (4/5) good  -SP    Rt Hip ADduction MMT, Gross Movement (4/5) good  -SP    Rt Knee Extension MMT, Gross Movement (4-/5) good minus  -SP    Rt Knee Flexion MMT, Gross Movement (4-/5) good minus  -SP    Rt Ankle Plantarflexion MMT, Gross Movement (5/5) normal  -SP    Rt Ankle Dorsiflexion MMT, Gross Movement (5/5) normal  -SP       Sensation    Sensation WNL? WNL  -SP       Lower Extremity Flexibility    Hamstrings Left:;Mildly limited  -SP    Hip Flexors Left:;Mildly limited  -SP    Quadriceps Left:;Moderately limited  -SP    ITB Left:;Mildly limited  -SP    Gastrocnemius Left:;Mildly limited  -SP    Soleus Left:;Mildly limited  -SP       LLE Quick Girth (cm)    Smallest ankle 21.5 cm  -SP    Largest calf 40 cm  -SP    Tib tuberosity 38.7 cm  -SP    Mid patella 45.5 cm  -SP    Distal thigh 49 cm  -SP       Transfers    Sit-Stand [place] (Transfers) independent  -SP    Stand-Sit [place] (Transfers) independent  -SP       Gait/Stairs (Locomotion)    [place] Level (Gait) independent  -SP    Assistive Device  (Gait) cane, straight  -SP    Deviations/Abnormal Patterns (Gait) left sided deviations;antalgic;gait speed decreased;stride length decreased  -SP    Left Sided Gait Deviations decreased knee extension;heel strike decreased  -SP    Right Sided Gait Deviations decreased knee extension  -SP    Comment, (Gait/Stairs) Patient presents with SPC with antalgic gait. Ambulates with left knee in slightly flexed position with decreased left heel strike and unequal step length and weight bearing on left  -SP              User Key  (r) = Recorded By, (t) = Taken By, (c) = Cosigned By      Initials Name Provider Type    Carol Hwang, PT Physical Therapist                                Therapy Education  Given: HEP  Program: Reinforced  How Provided: Verbal  Provided to: Patient  Level of Understanding: Verbalized, Demonstrated      PT OP Goals       Row Name 04/10/24 0800          PT Short Term Goals    STG Date to Achieve 04/25/24  -SP     STG 1 Pt to verbally report decreased left knee/leg pain to <4/10 with ADLs and everyday activities.  -SP     STG 1 Progress Goal Revised  -SP     STG 1 Progress Comments Patient with complaints of 5/10 knee pain s/p manipulation  -SP     STG 2 Pt independent with initial HEP issued by therapist.  -SP     STG 2 Progress Met  -SP     STG 3 Left knee ROM improved to 0-90 degrees to allow her to get in and out of car easier.  -SP     STG 3 Progress Partially Met;Ongoing;Progressing  -SP     STG 4 Left hip and knee strength improved by 1/2 muscle grade.  -SP     STG 4 Progress Met  -SP     STG 5 Edema left knee decreased by 1-2 cm.  -SP     STG 5 Progress Met  -SP     STG 5 Progress Comments Overall decreased knee edema, but continues to have increased girth vs right  -SP     STG 6 Pt able to ambulate home & short community distances with SPC with only minimal antalgic gait and improved L heel to toe gait.  -SP     STG 6 Progress Met  -SP     STG 7 Pt able to perform 20 SLR  independently and only 5-10 degree Quad lag.  -SP     STG 7 Progress Met  -SP     STG 7 Progress Comments --  -SP        Long Term Goals    LTG Date to Achieve 05/10/24  -SP     LTG 1 Pt to verbally report decreased left  knee/leg pain to <2/10 with ADLs and everyday activities.  -SP     LTG 1 Progress Ongoing;Progressing  -SP     LTG 2 Pt independent with more advanced HEP issued by therapist.  -SP     LTG 2 Progress Ongoing;Progressing  -SP     LTG 3 Left knee ROM improved to 0-115 degrees.  -SP     LTG 3 Progress Ongoing;Progressing  -SP     LTG 4 Left hip and knee strength improved to 4+-5/5.  -SP     LTG 4 Progress Ongoing;Progressing  -SP     LTG 5 Edema left knee decreased to nominal.  -SP     LTG 5 Progress Ongoing;Progressing  -SP     LTG 6 Pt able to ambulate home & short community distances without any AD with only nominal limp and good heel to toe gait.  -SP     LTG 6 Progress Ongoing;Progressing  -SP     LTG 7 No Quad lag noted with leg lifts.  -SP     LTG 7 Progress Ongoing;Progressing  -SP               User Key  (r) = Recorded By, (t) = Taken By, (c) = Cosigned By      Initials Name Provider Type    Carol Hwang, PT Physical Therapist                     PT Assessment/Plan       Row Name 04/10/24 1228          PT Assessment    Assessment Comments Patient presents s/p left knee manipulation 4-9-24.  Patient presents with continued left knee pain but does report overall decreased pain in knee since manipulation, reporting improved sleep and less tightness. Patient is using SPC for ambulation, but continues to exhibits antalgic gait with decreased left knee extension and heel strike.  She demonstrates overall strength progression since initiation of treatment.   Patient demonstrates PROM of left knee 9 to 92 degrees after stretching and manual therapy.  Patient is making gradual progress toward goals.  -SP     Please refer to paper survey for additional self-reported information Yes  -SP      Rehab Potential Good  -SP     Patient/caregiver participated in establishment of treatment plan and goals Yes  -SP     Patient would benefit from skilled therapy intervention Yes  -SP        PT Plan    PT Frequency Other (comment)  -SP     Predicted Duration of Therapy Intervention (PT) Patient to be seen daily for 2 weeks then decrease to 2-3 x week  -SP     PT Plan Comments 4 weeks  -SP               User Key  (r) = Recorded By, (t) = Taken By, (c) = Cosigned By      Initials Name Provider Type    Carol Hwang, PT Physical Therapist                     Modalities       Row Name 04/10/24 0800             Moist Heat    MH Applied Yes  -SP      Location left thigh and knee with patient in short sit on edge of table with stool (weighted) at ankle for static knee flexion stretch  -SP      PT Moist Heat Minutes 5  -SP         Ice    Ice Applied Yes  -SP      Location anterior and posterior L knee with patient supine.  -SP      PT Ice Rx Minutes 10  -SP      Ice S/P Rx Yes  -SP      Patient reports will apply ice at home to involved area Yes  -SP                User Key  (r) = Recorded By, (t) = Taken By, (c) = Cosigned By      Initials Name Provider Type    Carol Hwang, PT Physical Therapist                   OP Exercises       Row Name 04/10/24 1248 04/10/24 0800          Precautions    Existing Precautions/Restrictions -- no known precautions/restrictions  -SP        Subjective    Subjective Comments -- Patient is s/p left knee manipulation 4-9-10.  She reports that MD said she achieved 130 degrees in procedure.  Patient reports that her knee is feeling better and rates her pain level at 5/10.  She used rolling walker for mobility yesterday but feels safe using cane today.  She reports that typically her pain is at medial knee, but she is having more posterior aspect knee pain today.  -SP        Total Minutes    37833 - PT Therapeutic Exercise Minutes 15  -SP --     45554 - PT Manual  Therapy Minutes 15  -SP --        Exercise 1    Exercise Name 1 -- Heelslides with sheet  -SP     Cueing 1 -- Verbal;Tactile;Demo  -SP     Time 1 -- 7 minutes  -SP        Exercise 2    Exercise Name 2 -- Wallslides  -SP     Cueing 2 -- Verbal;Tactile;Demo  -SP     Time 2 -- 7 minutes  -SP        Exercise 3    Exercise Name 3 -- Bike- airdyne seat 2  -SP     Cueing 3 -- Verbal;Tactile;Demo  -SP     Time 3 -- 10 minutes  -SP        Exercise 4    Exercise Name 4 -- supine HS stretch with sheet/supine HS stretch with adduction  -SP     Cueing 4 -- Verbal;Tactile;Demo  -SP     Reps 4 -- 10/10  -SP     Time 4 -- 10 sec  -SP        Exercise 5    Exercise Name 5 -- QS over single towel roll  -SP     Cueing 5 -- Verbal;Tactile;Demo  -SP     Sets 5 -- [place] STIM (10/10)  -SP        Exercise 6    Exercise Name 6 -- SAQ  -SP        Exercise 7    Exercise Name 7 -- S/L hip abduction  -SP     Cueing 7 -- Verbal;Tactile;Demo  -SP        Exercise 8    Exercise Name 8 -- SLR  -SP     Cueing 8 -- Verbal;Tactile;Demo  -SP     Reps 8 -- 20  -SP        Exercise 9    Exercise Name 9 -- Prone hip extension  -SP     Cueing 9 -- Verbal  -SP     Reps 9 -- --  -SP        Exercise 10    Exercise Name 10 -- S/L hip adduction  -SP     Cueing 10 -- Verbal;Tactile;Demo  -SP        Exercise 11    Exercise Name 11 -- Heel prop  -SP     Cueing 11 -- Verbal;Tactile  -SP     Time 11 -- 5  -SP     Additional Comments -- tolerates 2# above knee for approximately 2 min  -SP        Exercise 12    Exercise Name 12 -- LAQ  -SP     Cueing 12 -- Verbal;Tactile;Demo  -SP     Reps 12 -- --  -SP        Exercise 13    Exercise Name 13 -- TKE  -SP     Cueing 13 -- Verbal  -SP     Reps 13 -- --  -SP     Time 13 -- --  -SP        Exercise 14    Exercise Name 14 -- standing calf raises  -SP     Cueing 14 -- Verbal;Tactile;Demo  -SP     Reps 14 -- --  -SP        Exercise 15    Exercise Name 15 -- standing Hamstring curl  -SP     Cueing 15 -- Verbal;Tactile;Demo  -SP      Reps 15 -- --  -SP        Exercise 16    Exercise Name 16 -- Forward step ups on 6 inch step  -SP     Cueing 16 -- Verbal;Tactile;Demo  -SP     Reps 16 -- --  -SP               User Key  (r) = Recorded By, (t) = Taken By, (c) = Cosigned By      Initials Name Provider Type    Carol Hwang, PT Physical Therapist                  Manual Rx (Last 36 Hours)       Manual Treatments       Row Name 04/10/24 1248 04/10/24 0900          Total Minutes    12060 - PT Manual Therapy Minutes 15  -SP --        Manual Rx 2    Manual Rx 2 Location -- Seated passive left knee flexion  -SP     Manual Rx 2 Duration -- 10 x 10 sec  -SP        Manual Rx 3    Manual Rx 3 Location -- Posterior tibial mobs  -SP     Manual Rx 3 Duration -- 3 x 5 osc gentle  -SP        Manual Rx 4    Manual Rx 4 Location -- left knee  -SP     Manual Rx 4 Type -- Passive knee extension stretch  15 sec x 5  -SP     Manual Rx 4 Duration -- 5 min  -SP               User Key  (r) = Recorded By, (t) = Taken By, (c) = Cosigned By      Initials Name Provider Type    Carol Hwang, PT Physical Therapist                                          Time Calculation:     Start Time: 0828  Stop Time: 0950  Time Calculation (min): 82 min  Timed Charges  10926 - PT Therapeutic Exercise Minutes: 15  20140 - PT Manual Therapy Minutes: 15  Untimed Charges  PT Moist Heat Minutes: 5  PT Ice Rx Minutes: 10  Total Minutes  Timed Charges Total Minutes: 30  Untimed Charges Total Minutes: 15   Total Minutes: 30     Therapy Charges for Today       Code Description Service Date Service Provider Modifiers Qty    06277530893 HC PT THER PROC EA 15 MIN 4/10/2024 Carol Enrique, PT GP 1    58895841004 HC PT MANUAL THERAPY EA 15 MIN 4/10/2024 Carol Enrique, PT GP 1                      Carol Enrique PT  4/10/2024

## 2024-04-11 ENCOUNTER — HOSPITAL ENCOUNTER (OUTPATIENT)
Dept: PHYSICAL THERAPY | Facility: HOSPITAL | Age: 44
Setting detail: THERAPIES SERIES
Discharge: HOME | End: 2024-04-11
Payer: COMMERCIAL

## 2024-04-11 DIAGNOSIS — Z96.652 STATUS POST LEFT PARTIAL KNEE REPLACEMENT: Primary | ICD-10-CM

## 2024-04-11 PROCEDURE — 97110 THERAPEUTIC EXERCISES: CPT | Performed by: PHYSICAL THERAPIST

## 2024-04-11 PROCEDURE — 97140 MANUAL THERAPY 1/> REGIONS: CPT | Performed by: PHYSICAL THERAPIST

## 2024-04-11 NOTE — THERAPY TREATMENT NOTE
Outpatient Physical Therapy Ortho Treatment Note   Jazz Bullock     Patient Name: Aida Peralta  : 1980  MRN: [id number]  Today's Date: 2024      Visit Date: 2024    Visit Dx:    ICD-10-CM ICD-9-CM   1. Status post left partial knee replacement  Z96.652 V43.65       Patient Active Problem List   Diagnosis    YULIA (generalized anxiety disorder)    History of seizure    Hypertension    Hypothyroidism    Primary osteoarthritis of both knees    Morbid obesity    Plica of knee, left    Adjustment disorder    Status post right partial knee replacement    Status post left partial knee replacement        Past Medical History:   Diagnosis Date    CTS (carpal tunnel syndrome)     GERD (gastroesophageal reflux disease)     Hypertension     Migraines     Plica of knee, left 10/22/2021    Primary osteoarthritis of both knees 10/22/2021    Seizure     one in  , no meds    Sleep apnea     no machine        Past Surgical History:   Procedure Laterality Date    CHOLECYSTECTOMY      KNEE ARTHROPLASTY, PARTIAL REPLACEMENT Right 2023    Procedure: KNEE ARTHROPLASTY, PARTIAL REPLACEMENT;  Surgeon: Siddhartha Pretty MD;  Location: [facility];  Service: Robotics - Ortho;  Laterality: Right;    TOTAL KNEE ARTHROPLASTY Left 2023    Procedure: KNEE ARTHROPLASTY UNICOMPARTMENTAL;  Surgeon: Siddhartha Pretty MD;  Location: [facility] OR;  Service: Orthopedics;  Laterality: Left;    TUBAL ABDOMINAL LIGATION          PT Ortho       Row Name 24 1400       Subjective    Subjective Comments Patient reports that her left knee feels sore, but still overall better than prior to manipulation.  -SP       Left Lower Ext    Lt Knee Extension/Flexion PROM 7 to 95 degrees after ROM exercises and manual techniques  -SP              User Key  (r) = Recorded By, (t) = Taken By, (c) = Cosigned By      Initials Name Provider Type    Carol Hwang, PT Physical Therapist                                 PT  Assessment/Plan       Row Name 04/11/24 4389          PT Assessment    Assessment Comments Patient with gradual improvement in ROM.  Patient able to make full revolution on bike today.  -SP        PT Plan    PT Plan Comments Continue daily to progress left knee ROM.  -SP               User Key  (r) = Recorded By, (t) = Taken By, (c) = Cosigned By      Initials Name Provider Type    Carol Hwang, PT Physical Therapist                     Modalities       Row Name 04/11/24 1400             Precautions    Existing Precautions/Restrictions no known precautions/restrictions  -SP         Moist Heat    MH Applied Yes  -SP      Location left thigh and knee with patient in short sit on edge of table with stool (weighted) at ankle for static knee flexion stretch  -SP      PT Moist Heat Minutes 5  -SP         Ice    Ice Applied Yes  -SP      Location anterior and posterior L knee with patient supine.  -SP      PT Ice Rx Minutes 10  -SP      Ice S/P Rx Yes  -SP      Patient reports will apply ice at home to involved area Yes  -SP                User Key  (r) = Recorded By, (t) = Taken By, (c) = Cosigned By      Initials Name Provider Type    Carol Hwang, PT Physical Therapist                   OP Exercises       Row Name 04/11/24 1459 04/11/24 1400          Precautions    Existing Precautions/Restrictions -- no known precautions/restrictions  -SP        Subjective    Subjective Comments -- Patient reports that her left knee feels sore, but still overall better than prior to manipulation.  -SP        Total Minutes    93606 - PT Therapeutic Exercise Minutes 15  -SP --     25719 - PT Manual Therapy Minutes 15  -SP --        Exercise 1    Exercise Name 1 -- Heelslides with sheet  -SP     Cueing 1 -- Verbal;Tactile;Demo  -SP     Time 1 -- 7 minutes  -SP        Exercise 2    Exercise Name 2 -- Wallslides  -SP     Cueing 2 -- Verbal;Tactile;Demo  -SP     Time 2 -- 7 minutes  -SP        Exercise 3     Exercise Name 3 -- Bike- airdyne seat 2  -SP     Cueing 3 -- Verbal;Tactile;Demo  -SP     Time 3 -- 10 minutes  -SP        Exercise 4    Exercise Name 4 -- supine HS stretch with sheet/supine HS stretch with adduction  -SP     Cueing 4 -- Verbal;Tactile;Demo  -SP     Reps 4 -- 10/10  -SP     Time 4 -- 10 sec  -SP        Exercise 5    Exercise Name 5 -- QS over single towel roll  -SP     Cueing 5 -- Verbal;Tactile;Demo  -SP     Sets 5 -- Ethiopian STIM (10/10)  -SP        Exercise 6    Exercise Name 6 -- QS with towel roll; knee and ankle  -SP     Cueing 6 -- Verbal;Tactile  -SP     Reps 6 -- 15  -SP     Time 6 -- 5 sec  -SP        Exercise 7    Exercise Name 7 -- S/L hip abduction  -SP     Cueing 7 -- Verbal;Tactile;Demo  -SP        Exercise 8    Exercise Name 8 -- SLR  -SP     Cueing 8 -- Verbal;Tactile;Demo  -SP        Exercise 9    Exercise Name 9 -- Prone hip extension  -SP     Cueing 9 -- Verbal  -SP        Exercise 10    Exercise Name 10 -- S/L hip adduction  -SP     Cueing 10 -- Verbal;Tactile;Demo  -SP        Exercise 11    Exercise Name 11 -- Heel prop  -SP     Cueing 11 -- Verbal;Tactile  -SP     Time 11 -- 5  -SP     Additional Comments -- 2# above knee  -SP        Exercise 12    Exercise Name 12 -- LAQ  -SP     Cueing 12 -- Verbal;Tactile;Demo  -SP        Exercise 13    Exercise Name 13 -- TKE  -SP     Cueing 13 -- Verbal  -SP        Exercise 14    Exercise Name 14 -- standing calf raises  -SP     Cueing 14 -- Verbal;Tactile;Demo  -SP        Exercise 15    Exercise Name 15 -- standing Hamstring curl  -SP     Cueing 15 -- Verbal;Tactile;Demo  -SP        Exercise 16    Exercise Name 16 -- Forward step ups on 6 inch step  -SP     Cueing 16 -- Verbal;Tactile;Demo  -SP               User Key  (r) = Recorded By, (t) = Taken By, (c) = Cosigned By      Initials Name Provider Type    Carol Hwang, PT Physical Therapist                             Manual Rx (Last 36 Hours)       Manual Treatments        Row Name 04/11/24 1459 04/11/24 1300          Total Minutes    25847 - PT Manual Therapy Minutes 15  -SP --        Manual Rx 2    Manual Rx 2 Location -- Seated passive left knee flexion  -SP     Manual Rx 2 Duration -- 10 x 10 sec  -SP        Manual Rx 3    Manual Rx 3 Location -- Posterior tibial mobs  -SP     Manual Rx 3 Duration -- 3 x 5 osc gentle  -SP        Manual Rx 4    Manual Rx 4 Location -- left knee  -SP     Manual Rx 4 Type -- Passive knee extension stretch  15 sec x 5  -SP     Manual Rx 4 Duration -- 5 min  -SP               User Key  (r) = Recorded By, (t) = Taken By, (c) = Cosigned By      Initials Name Provider Type    Carol Hwang, PT Physical Therapist                        Therapy Education  Given: HEP  Program: Reinforced  How Provided: Verbal  Provided to: Patient  Level of Understanding: Verbalized, Demonstrated              Time Calculation:   Start Time: 1325  Stop Time: 1500  Time Calculation (min): 95 min  Timed Charges  62016 - PT Therapeutic Exercise Minutes: 15  74820 - PT Manual Therapy Minutes: 15  Untimed Charges  PT Moist Heat Minutes: 5  PT Ice Rx Minutes: 10  Total Minutes  Timed Charges Total Minutes: 30  Untimed Charges Total Minutes: 15   Total Minutes: 30  Therapy Charges for Today       Code Description Service Date Service Provider Modifiers Qty    63420560248 HC PT THER PROC EA 15 MIN 4/11/2024 Carol Enrique, PT GP 1    [id number] HC PT MANUAL THERAPY EA 15 MIN 4/11/2024 Carol Enrique, PT GP 1                      Carol Enrique PT  4/11/2024

## 2024-04-12 ENCOUNTER — HOSPITAL ENCOUNTER (OUTPATIENT)
Dept: PHYSICAL THERAPY | Facility: HOSPITAL | Age: 44
Setting detail: THERAPIES SERIES
Discharge: HOME | End: 2024-04-12
Payer: COMMERCIAL

## 2024-04-12 DIAGNOSIS — Z96.652 STATUS POST LEFT PARTIAL KNEE REPLACEMENT: Primary | ICD-10-CM

## 2024-04-12 PROCEDURE — 97110 THERAPEUTIC EXERCISES: CPT

## 2024-04-12 NOTE — THERAPY TREATMENT NOTE
Outpatient Physical Therapy Ortho Treatment Note   Jazz Bullock     Patient Name: Aida Peralta  : 1980  MRN: [id number]  Today's Date: 2024      Visit Date: 2024    Visit Dx:    ICD-10-CM ICD-9-CM   1. Status post left partial knee replacement  Z96.652 V43.65       Patient Active Problem List   Diagnosis    YULIA (generalized anxiety disorder)    History of seizure    Hypertension    Hypothyroidism    Primary osteoarthritis of both knees    Morbid obesity    Plica of knee, left    Adjustment disorder    Status post right partial knee replacement    Status post left partial knee replacement        Past Medical History:   Diagnosis Date    CTS (carpal tunnel syndrome)     GERD (gastroesophageal reflux disease)     Hypertension     Migraines     Plica of knee, left 10/22/2021    Primary osteoarthritis of both knees 10/22/2021    Seizure     one in  , no meds    Sleep apnea     no machine        Past Surgical History:   Procedure Laterality Date    CHOLECYSTECTOMY      KNEE ARTHROPLASTY, PARTIAL REPLACEMENT Right 2023    Procedure: KNEE ARTHROPLASTY, PARTIAL REPLACEMENT;  Surgeon: Siddhartha Pretty MD;  Location: [facility];  Service: Robotics - Ortho;  Laterality: Right;    TOTAL KNEE ARTHROPLASTY Left 2023    Procedure: KNEE ARTHROPLASTY UNICOMPARTMENTAL;  Surgeon: Siddhartha Pretty MD;  Location: [facility];  Service: Orthopedics;  Laterality: Left;    TUBAL ABDOMINAL LIGATION          PT Ortho       Row Name 24 1200       Left Lower Ext    Lt Knee Extension/Flexion PROM 0-4-97 degrees  -KM              User Key  (r) = Recorded By, (t) = Taken By, (c) = Cosigned By      Initials Name Provider Type    Lynn Turk PTA Physical Therapist Assistant                                 PT Assessment/Plan       Row Name 24 1200          PT Assessment    Assessment Comments Continue to push ROM; pt is slowly improving each visit. Incorporated additional stretching for  extension  -KM        PT Plan    PT Plan Comments Pt to continue HEP 2-3x/day  -KM               User Key  (r) = Recorded By, (t) = Taken By, (c) = Cosigned By      Initials Name Provider Type    Lynn Turk PTA Physical Therapist Assistant                     Modalities       Row Name 04/12/24 1200             Moist Heat    Location left thigh and knee with patient in short sit on edge of table with stool (weighted) at ankle for static knee flexion stretch  -KM      PT Moist Heat Minutes 5  -KM         Ice    Location anterior and posterior L knee with patient supine.  -KM      PT Ice Rx Minutes 10  -KM      Ice S/P Rx Yes  -KM      Patient reports will apply ice at home to involved area Yes  -KM                User Key  (r) = Recorded By, (t) = Taken By, (c) = Cosigned By      Initials Name Provider Type    Lynn Turk PTA Physical Therapist Assistant                   OP Exercises       Row Name 04/12/24 1200             Precautions    Existing Precautions/Restrictions no known precautions/restrictions  -KM         Subjective    Subjective Comments Pt states her knee is sore but does feel it is moving better.  -KM         Exercise 1    Exercise Name 1 Heelslides with sheet  -KM      Cueing 1 Verbal;Tactile;Demo  -KM      Time 1 8 minutes  -KM         Exercise 2    Exercise Name 2 Wallslides  -KM      Cueing 2 Verbal;Tactile;Demo  -KM      Time 2 8 minutes  -KM         Exercise 3    Exercise Name 3 Bike- airdyne seat 2  -KM      Cueing 3 Verbal;Tactile;Demo  -KM      Time 3 10 minutes  -KM         Exercise 4    Exercise Name 4 supine HS stretch with sheet/supine HS stretch with adduction  -KM      Cueing 4 Verbal;Tactile;Demo  -KM      Reps 4 10/10  -KM      Time 4 10 sec  -KM         Exercise 5    Exercise Name 5 QS over single towel roll  -KM      Cueing 5 Verbal;Tactile;Demo  -KM      Sets 5 Belarusian STIM (10/10)  -KM         Exercise 6    Exercise Name 6 QS (ankle)  -KM      Cueing 6  Verbal;Tactile  -KM      Reps 6 25  -KM      Time 6 5 sec  -KM         Exercise 7    Exercise Name 7 S/L hip abduction  -KM      Cueing 7 Verbal;Tactile;Demo  -KM         Exercise 8    Exercise Name 8 SLR  -KM      Cueing 8 Verbal;Tactile;Demo  -KM         Exercise 9    Exercise Name 9 Prone hip extension  -KM      Cueing 9 Verbal  -KM         Exercise 10    Exercise Name 10 S/L hip adduction  -KM      Cueing 10 Verbal;Tactile;Demo  -KM         Exercise 11    Exercise Name 11 Heel prop  -KM      Cueing 11 Verbal;Tactile  -KM      Time 11 5  -KM      Additional Comments 3#  -KM         Exercise 12    Exercise Name 12 Prone Leg Hang  -KM      Cueing 12 Verbal;Tactile;Demo  -KM      Time 12 5  -KM         Exercise 13    Exercise Name 13 TKE  -KM      Cueing 13 Verbal  -KM         Exercise 14    Exercise Name 14 standing calf raises  -KM      Cueing 14 Verbal;Tactile;Demo  -KM         Exercise 15    Exercise Name 15 standing Hamstring curl  -KM      Cueing 15 Verbal;Tactile;Demo  -KM         Exercise 16    Exercise Name 16 Forward step ups on 6 inch step  -KM      Cueing 16 Verbal;Tactile;Demo  -KM                User Key  (r) = Recorded By, (t) = Taken By, (c) = Cosigned By      Initials Name Provider Type    Lynn Turk PTA Physical Therapist Assistant                                                    Time Calculation:   Start Time: 1200  Stop Time: 1316  Time Calculation (min): 76 min  Untimed Charges  PT Moist Heat Minutes: 5  PT Ice Rx Minutes: 10  Total Minutes  Untimed Charges Total Minutes: 15   Total Minutes: 15  Therapy Charges for Today       Code Description Service Date Service Provider Modifiers Qty    94115061587 HC PT THER PROC EA 15 MIN 4/12/2024 Lynn Roth PTA GP 2                      Lynn Roth PTA  4/12/2024

## 2024-04-15 ENCOUNTER — HOSPITAL ENCOUNTER (OUTPATIENT)
Dept: PHYSICAL THERAPY | Facility: HOSPITAL | Age: 44
Setting detail: THERAPIES SERIES
Discharge: HOME | End: 2024-04-15
Payer: COMMERCIAL

## 2024-04-15 DIAGNOSIS — Z96.652 STATUS POST LEFT PARTIAL KNEE REPLACEMENT: Primary | ICD-10-CM

## 2024-04-15 PROCEDURE — 97110 THERAPEUTIC EXERCISES: CPT | Performed by: PHYSICAL THERAPIST

## 2024-04-15 PROCEDURE — 97140 MANUAL THERAPY 1/> REGIONS: CPT | Performed by: PHYSICAL THERAPIST

## 2024-04-15 NOTE — THERAPY TREATMENT NOTE
Outpatient Physical Therapy Ortho Treatment Note   Jazz Bullock     Patient Name: Aida Peralta  : 1980  MRN: [id number]  Today's Date: 4/15/2024      Visit Date: 04/15/2024    Visit Dx:    ICD-10-CM ICD-9-CM   1. Status post left partial knee replacement  Z96.652 V43.65       Patient Active Problem List   Diagnosis    YULIA (generalized anxiety disorder)    History of seizure    Hypertension    Hypothyroidism    Primary osteoarthritis of both knees    Morbid obesity    Plica of knee, left    Adjustment disorder    Status post right partial knee replacement    Status post left partial knee replacement        Past Medical History:   Diagnosis Date    CTS (carpal tunnel syndrome)     GERD (gastroesophageal reflux disease)     Hypertension     Migraines     Plica of knee, left 10/22/2021    Primary osteoarthritis of both knees 10/22/2021    Seizure     one in  , no meds    Sleep apnea     no machine        Past Surgical History:   Procedure Laterality Date    CHOLECYSTECTOMY      KNEE ARTHROPLASTY, PARTIAL REPLACEMENT Right 2023    Procedure: KNEE ARTHROPLASTY, PARTIAL REPLACEMENT;  Surgeon: Siddhartha Pretty MD;  Location: [facility] OR;  Service: Robotics - Ortho;  Laterality: Right;    TOTAL KNEE ARTHROPLASTY Left 2023    Procedure: KNEE ARTHROPLASTY UNICOMPARTMENTAL;  Surgeon: Siddhartha Pretty MD;  Location: [facility] MAIN OR;  Service: Orthopedics;  Laterality: Left;    TUBAL ABDOMINAL LIGATION          PT Ortho       Row Name 04/15/24 1600       Left Lower Ext    Lt Knee Extension/Flexion PROM 0-5-92 degrees  -SP      Row Name 04/15/24 1500       Subjective    Subjective Comments Patient reports that she did go to work for a few hours today.  She is complaining of increased pain at distal medial thight area but cannot relate this to any specific activity change.  -SP              User Key  (r) = Recorded By, (t) = Taken By, (c) = Cosigned By      Initials Name Provider Type    EVELYN Enrique  Carol Pardo, PT Physical Therapist                                 PT Assessment/Plan       Row Name 04/15/24 1629          PT Assessment    Assessment Comments Patient with decreased ROM today after the weekend.  -SP        PT Plan    PT Plan Comments Continue to progress left knee ROM  -SP               User Key  (r) = Recorded By, (t) = Taken By, (c) = Cosigned By      Initials Name Provider Type    Carol Hwang, PT Physical Therapist                     Modalities       Row Name 04/15/24 1500             Precautions    Existing Precautions/Restrictions no known precautions/restrictions  -SP         Moist Heat    MH Applied Yes  -SP      Location left thigh and knee with patient in short sit on edge of table with stool (weighted) at ankle for static knee flexion stretch  -SP      PT Moist Heat Minutes 8  -SP         Ice    Ice Applied Yes  -SP      Location anterior and posterior L knee with patient supine.  -SP      PT Ice Rx Minutes 10  -SP      Ice S/P Rx Yes  -SP      Patient reports will apply ice at home to involved area Yes  -SP                User Key  (r) = Recorded By, (t) = Taken By, (c) = Cosigned By      Initials Name Provider Type    Carol Hwang, PT Physical Therapist                   OP Exercises       Row Name 04/15/24 1629 04/15/24 1500          Precautions    Existing Precautions/Restrictions -- no known precautions/restrictions  -SP        Subjective    Subjective Comments -- Patient reports that she did go to work for a few hours today.  She is complaining of increased pain at distal medial thight area but cannot relate this to any specific activity change.  -SP        Total Minutes    75769 - PT Therapeutic Exercise Minutes 15  -SP --     81446 - PT Manual Therapy Minutes 15  -SP --        Exercise 1    Exercise Name 1 -- Heelslides with sheet  -SP     Cueing 1 -- Verbal;Tactile;Demo  -SP     Time 1 -- 8 minutes  -SP        Exercise 2    Exercise Name 2 --  Wallslides  -SP     Cueing 2 -- Verbal;Tactile;Demo  -SP     Time 2 -- 8 minutes  -SP        Exercise 3    Exercise Name 3 -- Bike- airdyne seat 2  -SP     Cueing 3 -- Verbal;Tactile;Demo  -SP     Time 3 -- 10 minutes  -SP        Exercise 4    Exercise Name 4 -- supine HS stretch with sheet/supine HS stretch with adduction  -SP     Cueing 4 -- Verbal;Tactile;Demo  -SP     Reps 4 -- 10/10  -SP     Time 4 -- 10 sec  -SP        Exercise 5    Exercise Name 5 -- QS over single towel roll  -SP     Cueing 5 -- Verbal;Tactile;Demo  -SP     Sets 5 -- Fijian STIM (10/10)  -SP        Exercise 6    Exercise Name 6 -- QS (ankle)  -SP     Cueing 6 -- Verbal;Tactile  -SP     Reps 6 -- 25  -SP     Time 6 -- 5 sec  -SP        Exercise 7    Exercise Name 7 -- S/L hip abduction  -SP     Cueing 7 -- Verbal;Tactile;Demo  -SP        Exercise 8    Exercise Name 8 -- SLR  -SP     Cueing 8 -- Verbal;Tactile;Demo  -SP        Exercise 9    Exercise Name 9 -- Prone hip extension  -SP     Cueing 9 -- Verbal  -SP        Exercise 10    Exercise Name 10 -- S/L hip adduction  -SP     Cueing 10 -- Verbal;Tactile;Demo  -SP        Exercise 11    Exercise Name 11 -- Heel prop  -SP     Cueing 11 -- Verbal;Tactile  -SP     Time 11 -- 5  -SP        Exercise 12    Exercise Name 12 -- Prone Leg Hang  -SP     Cueing 12 -- Verbal;Tactile;Demo  -SP     Time 12 -- --  -SP        Exercise 13    Exercise Name 13 -- TKE  -SP     Cueing 13 -- Verbal  -SP        Exercise 14    Exercise Name 14 -- standing calf raises  -SP     Cueing 14 -- Verbal;Tactile;Demo  -SP        Exercise 15    Exercise Name 15 -- standing Hamstring curl  -SP     Cueing 15 -- Verbal;Tactile;Demo  -SP        Exercise 16    Exercise Name 16 -- Forward step ups on 6 inch step  -SP     Cueing 16 -- Verbal;Tactile;Demo  -SP               User Key  (r) = Recorded By, (t) = Taken By, (c) = Cosigned By      Initials Name Provider Type    Carol Hwang, PT Physical Therapist                              Manual Rx (Last 36 Hours)       Manual Treatments       Row Name 04/15/24 1629 04/15/24 1500          Total Minutes    74252 - PT Manual Therapy Minutes 15  -SP --        Manual Rx 2    Manual Rx 2 Location -- Seated passive left knee flexion  -SP     Manual Rx 2 Duration -- 10 x 10 sec  -SP        Manual Rx 3    Manual Rx 3 Location -- Posterior tibial mobs  -SP     Manual Rx 3 Duration -- 3 x 5 osc gentle  -SP        Manual Rx 4    Manual Rx 4 Location -- left knee  -SP     Manual Rx 4 Type -- Passive knee extension stretch  15 sec x 5  -SP     Manual Rx 4 Duration -- 5 min  -SP               User Key  (r) = Recorded By, (t) = Taken By, (c) = Cosigned By      Initials Name Provider Type    Carol Hwang, PT Physical Therapist                        Therapy Education  Given: HEP  Program: Reinforced  How Provided: Verbal  Provided to: Patient  Level of Understanding: Verbalized, Demonstrated              Time Calculation:   Start Time: 1445  Stop Time: 1600  Time Calculation (min): 75 min  Timed Charges  36592 - PT Therapeutic Exercise Minutes: 15  50699 - PT Manual Therapy Minutes: 15  Untimed Charges  PT Moist Heat Minutes: 8  PT Ice Rx Minutes: 10  Total Minutes  Timed Charges Total Minutes: 30  Untimed Charges Total Minutes: 18   Total Minutes: 30  Therapy Charges for Today       Code Description Service Date Service Provider Modifiers Qty    20835260977 HC PT THER PROC EA 15 MIN 4/15/2024 Carol Enrique, PT GP 1    [id number] HC PT MANUAL THERAPY EA 15 MIN 4/15/2024 Carol Enrique, PT GP 1                      Carol Enrique PT  4/15/2024

## 2024-04-16 ENCOUNTER — HOSPITAL ENCOUNTER (OUTPATIENT)
Dept: PHYSICAL THERAPY | Facility: HOSPITAL | Age: 44
Setting detail: THERAPIES SERIES
Discharge: HOME | End: 2024-04-16
Payer: COMMERCIAL

## 2024-04-16 DIAGNOSIS — Z96.652 STATUS POST LEFT PARTIAL KNEE REPLACEMENT: Primary | ICD-10-CM

## 2024-04-16 PROCEDURE — 97110 THERAPEUTIC EXERCISES: CPT | Performed by: PHYSICAL THERAPIST

## 2024-04-16 PROCEDURE — 97140 MANUAL THERAPY 1/> REGIONS: CPT | Performed by: PHYSICAL THERAPIST

## 2024-04-16 NOTE — THERAPY TREATMENT NOTE
Outpatient Physical Therapy Ortho Treatment Note   Jazz Bullock     Patient Name: Aida Peralta  : 1980  MRN: [id number]  Today's Date: 2024      Visit Date: 2024    Visit Dx:    ICD-10-CM ICD-9-CM   1. Status post left partial knee replacement  Z96.652 V43.65       Patient Active Problem List   Diagnosis    YULIA (generalized anxiety disorder)    History of seizure    Hypertension    Hypothyroidism    Primary osteoarthritis of both knees    Morbid obesity    Plica of knee, left    Adjustment disorder    Status post right partial knee replacement    Status post left partial knee replacement        Past Medical History:   Diagnosis Date    CTS (carpal tunnel syndrome)     GERD (gastroesophageal reflux disease)     Hypertension     Migraines     Plica of knee, left 10/22/2021    Primary osteoarthritis of both knees 10/22/2021    Seizure     one in  , no meds    Sleep apnea     no machine        Past Surgical History:   Procedure Laterality Date    CHOLECYSTECTOMY      KNEE ARTHROPLASTY, PARTIAL REPLACEMENT Right 2023    Procedure: KNEE ARTHROPLASTY, PARTIAL REPLACEMENT;  Surgeon: Siddhartha Pretty MD;  Location: [facility];  Service: Robotics - Ortho;  Laterality: Right;    TOTAL KNEE ARTHROPLASTY Left 2023    Procedure: KNEE ARTHROPLASTY UNICOMPARTMENTAL;  Surgeon: Siddhartha Pretty MD;  Location: [facility] OR;  Service: Orthopedics;  Laterality: Left;    TUBAL ABDOMINAL LIGATION          PT Ortho       Row Name 24 1500       Subjective    Subjective Comments Patient reports that she continues to be sore but still feels like her knee is not as tight as it had been.  She is having some discomfort at proximal calf area  -SP       Left Lower Ext    Lt Knee Extension/Flexion PROM 0-4-92 degrees  -SP              User Key  (r) = Recorded By, (t) = Taken By, (c) = Cosigned By      Initials Name Provider Type    Carol Hwang, PT Physical Therapist                                  PT Assessment/Plan       Row Name 04/16/24 1657          PT Assessment    Assessment Comments Patient with minimal change in left knee ROM today.  She continues to have significant left knee tightness the limits functional mobility  -SP        PT Plan    PT Plan Comments Continue to progress left knee ROM  -SP               User Key  (r) = Recorded By, (t) = Taken By, (c) = Cosigned By      Initials Name Provider Type    Carol Hwang, PT Physical Therapist                     Modalities       Row Name 04/16/24 1500             Precautions    Existing Precautions/Restrictions no known precautions/restrictions  -SP         Moist Heat    MH Applied Yes  -SP      Location left thigh and knee with patient in short sit on edge of table with stool (weighted) at ankle for static knee flexion stretch  -SP      PT Moist Heat Minutes 8  -SP         Ice    Ice Applied Yes  -SP      Location anterior and posterior L knee with patient supine.  -SP      PT Ice Rx Minutes 10  -SP      Ice S/P Rx Yes  -SP      Patient reports will apply ice at home to involved area Yes  -SP                User Key  (r) = Recorded By, (t) = Taken By, (c) = Cosigned By      Initials Name Provider Type    Carol Hwang, PT Physical Therapist                   OP Exercises       Row Name 04/16/24 1659 04/16/24 1500          Precautions    Existing Precautions/Restrictions -- no known precautions/restrictions  -SP        Subjective    Subjective Comments -- Patient reports that she continues to be sore but still feels like her knee is not as tight as it had been.  She is having some discomfort at proximal calf area  -SP        Total Minutes    41443 - PT Therapeutic Exercise Minutes 15  -SP --     87860 - PT Manual Therapy Minutes 15  -SP --        Exercise 1    Exercise Name 1 -- Heelslides with sheet  -SP     Cueing 1 -- Verbal;Tactile;Demo  -SP     Time 1 -- 8 minutes  -SP        Exercise 2    Exercise  Name 2 -- Wallslides  -SP     Cueing 2 -- Verbal;Tactile;Demo  -SP     Time 2 -- 8 minutes  -SP        Exercise 3    Exercise Name 3 -- Bike- airdyne seat 2  -SP     Cueing 3 -- Verbal;Tactile;Demo  -SP     Time 3 -- 10 minutes  -SP        Exercise 4    Exercise Name 4 -- supine HS stretch with sheet/supine HS stretch with adduction  -SP     Cueing 4 -- Verbal;Tactile;Demo  -SP     Reps 4 -- 10/10  -SP     Time 4 -- 10 sec  -SP        Exercise 5    Exercise Name 5 -- QS over single towel roll  -SP     Cueing 5 -- Verbal;Tactile;Demo  -SP     Sets 5 -- Jordanian STIM (10/10)  -SP        Exercise 6    Exercise Name 6 -- QS (ankle)  -SP     Cueing 6 -- Verbal;Tactile  -SP     Reps 6 -- 25  -SP     Time 6 -- 5 sec  -SP        Exercise 7    Exercise Name 7 -- S/L hip abduction  -SP     Cueing 7 -- Verbal;Tactile;Demo  -SP        Exercise 8    Exercise Name 8 -- SLR  -SP     Cueing 8 -- Verbal;Tactile;Demo  -SP        Exercise 9    Exercise Name 9 -- Prone hip extension  -SP     Cueing 9 -- Verbal  -SP        Exercise 10    Exercise Name 10 -- S/L hip adduction  -SP     Cueing 10 -- Verbal;Tactile;Demo  -SP        Exercise 11    Exercise Name 11 -- Heel prop  -SP     Cueing 11 -- Verbal;Tactile  -SP     Time 11 -- 5  -SP     Additional Comments -- 3#  -SP        Exercise 12    Exercise Name 12 -- Prone Leg Hang  -SP     Cueing 12 -- Verbal;Tactile;Demo  -SP        Exercise 13    Exercise Name 13 -- TKE  -SP     Cueing 13 -- Verbal  -SP        Exercise 14    Exercise Name 14 -- standing calf raises  -SP     Cueing 14 -- Verbal;Tactile;Demo  -SP        Exercise 15    Exercise Name 15 -- standing Hamstring curl  -SP     Cueing 15 -- Verbal;Tactile;Demo  -SP        Exercise 16    Exercise Name 16 -- Forward step ups on 6 inch step  -SP     Cueing 16 -- Verbal;Tactile;Demo  -SP        Exercise 17    Exercise Name 17 -- knee flexion stretch on stair  -SP     Cueing 17 -- Verbal;Demo  -SP     Reps 17 -- 5  -SP     Time 17 -- 10  sec  -SP               User Key  (r) = Recorded By, (t) = Taken By, (c) = Cosigned By      Initials Name Provider Type    Carol Hwang, KELTON Physical Therapist                             Manual Rx (Last 36 Hours)       Manual Treatments       Row Name 04/16/24 1659 04/16/24 1500          Total Minutes    50651 - PT Manual Therapy Minutes 15  -SP --        Manual Rx 2    Manual Rx 2 Location -- Seated passive left knee flexion  -SP     Manual Rx 2 Duration -- 10 x 10 sec  -SP        Manual Rx 3    Manual Rx 3 Location -- Posterior tibial mobs  -SP     Manual Rx 3 Duration -- 3 x 5 osc gentle  -SP        Manual Rx 4    Manual Rx 4 Location -- left knee  -SP     Manual Rx 4 Type -- Passive knee extension stretch  15 sec x 5  -SP     Manual Rx 4 Duration -- 5 min  -SP               User Key  (r) = Recorded By, (t) = Taken By, (c) = Cosigned By      Initials Name Provider Type    Carol Hwang, KELTON Physical Therapist                        Therapy Education  Given: HEP  Program: Reinforced  How Provided: Verbal  Provided to: Patient  Level of Understanding: Verbalized, Demonstrated              Time Calculation:   Start Time: 1515  Stop Time: 1700  Time Calculation (min): 105 min  Timed Charges  06715 - PT Therapeutic Exercise Minutes: 15  56463 - PT Manual Therapy Minutes: 15  Untimed Charges  PT Moist Heat Minutes: 8  PT Ice Rx Minutes: 10  Total Minutes  Timed Charges Total Minutes: 30  Untimed Charges Total Minutes: 18   Total Minutes: 30  Therapy Charges for Today       Code Description Service Date Service Provider Modifiers Qty    13268731679 HC PT THER PROC EA 15 MIN 4/16/2024 Carol Enrique, PT GP 1    11710269809 HC PT MANUAL THERAPY EA 15 MIN 4/16/2024 Carol Enrique, PT GP 1                      Carol Enrique, PT  4/16/2024

## 2024-04-17 ENCOUNTER — HOSPITAL ENCOUNTER (OUTPATIENT)
Dept: PHYSICAL THERAPY | Facility: HOSPITAL | Age: 44
Setting detail: THERAPIES SERIES
Discharge: HOME | End: 2024-04-17
Payer: COMMERCIAL

## 2024-04-17 DIAGNOSIS — Z96.652 STATUS POST LEFT PARTIAL KNEE REPLACEMENT: Primary | ICD-10-CM

## 2024-04-17 PROCEDURE — 97140 MANUAL THERAPY 1/> REGIONS: CPT | Performed by: PHYSICAL THERAPIST

## 2024-04-17 PROCEDURE — 97110 THERAPEUTIC EXERCISES: CPT | Performed by: PHYSICAL THERAPIST

## 2024-04-17 NOTE — THERAPY TREATMENT NOTE
Outpatient Physical Therapy Ortho Treatment Note   Jazz Bullock     Patient Name: Aida Peralta  : 1980  MRN: [id number]  Today's Date: 2024      Visit Date: 2024    Visit Dx:    ICD-10-CM ICD-9-CM   1. Status post left partial knee replacement  Z96.652 V43.65       Patient Active Problem List   Diagnosis    YULIA (generalized anxiety disorder)    History of seizure    Hypertension    Hypothyroidism    Primary osteoarthritis of both knees    Morbid obesity    Plica of knee, left    Adjustment disorder    Status post right partial knee replacement    Status post left partial knee replacement        Past Medical History:   Diagnosis Date    CTS (carpal tunnel syndrome)     GERD (gastroesophageal reflux disease)     Hypertension     Migraines     Plica of knee, left 10/22/2021    Primary osteoarthritis of both knees 10/22/2021    Seizure     one in  , no meds    Sleep apnea     no machine        Past Surgical History:   Procedure Laterality Date    CHOLECYSTECTOMY      KNEE ARTHROPLASTY, PARTIAL REPLACEMENT Right 2023    Procedure: KNEE ARTHROPLASTY, PARTIAL REPLACEMENT;  Surgeon: Siddhartha Pretty MD;  Location: [facility] OR;  Service: Robotics - Ortho;  Laterality: Right;    TOTAL KNEE ARTHROPLASTY Left 2023    Procedure: KNEE ARTHROPLASTY UNICOMPARTMENTAL;  Surgeon: Siddhartha Pretty MD;  Location: [facility] OR;  Service: Orthopedics;  Laterality: Left;    TUBAL ABDOMINAL LIGATION          PT Ortho       Row Name 24 1600       Subjective    Subjective Comments Patient reports that her knee, though tight, still feels much better since manipulation  -SP       Left Lower Ext    Lt Knee Extension/Flexion PROM 0-6-96 degrees after exercise  -SP              User Key  (r) = Recorded By, (t) = Taken By, (c) = Cosigned By      Initials Name Provider Type    Carol Hwang, PT Physical Therapist                                 PT Assessment/Plan       Row Name 24  1654          PT Assessment    Assessment Comments Patient continues to have significant tightness in left knee and difficulty tolerating passive stretch.  She does continue to report decreased pain since knee manipulation  -SP        PT Plan    PT Plan Comments Continue to progress left knee ROM  -SP               User Key  (r) = Recorded By, (t) = Taken By, (c) = Cosigned By      Initials Name Provider Type    Carol Hwang, PT Physical Therapist                     Modalities       Row Name 04/17/24 1600             Precautions    Existing Precautions/Restrictions no known precautions/restrictions  -SP         Moist Heat    MH Applied Yes  -SP      Location left thigh and knee with patient in short sit on edge of table with stool (weighted) at ankle for static knee flexion stretch  -SP      PT Moist Heat Minutes 8  -SP         Ice    Ice Applied Yes  -SP      Location anterior and posterior L knee with patient supine.  -SP      PT Ice Rx Minutes 10  -SP      Ice S/P Rx Yes  -SP      Patient reports will apply ice at home to involved area Yes  -SP                User Key  (r) = Recorded By, (t) = Taken By, (c) = Cosigned By      Initials Name Provider Type    Carol Hwang, PT Physical Therapist                   OP Exercises       Row Name 04/17/24 1656 04/17/24 1600          Precautions    Existing Precautions/Restrictions -- no known precautions/restrictions  -SP        Subjective    Subjective Comments -- Patient reports that her knee, though tight, still feels much better since manipulation  -SP        Total Minutes    82331 - PT Therapeutic Exercise Minutes 15  -SP --     50685 - PT Manual Therapy Minutes 15  -SP --        Exercise 1    Exercise Name 1 -- Heelslides with sheet  -SP     Cueing 1 -- Verbal;Tactile;Demo  -SP     Time 1 -- 8 minutes  -SP        Exercise 2    Exercise Name 2 -- Wallslides  -SP     Cueing 2 -- Verbal;Tactile;Demo  -SP     Time 2 -- 8 minutes  -SP         Exercise 3    Exercise Name 3 -- Bike- airdyne seat 2  -SP     Cueing 3 -- Verbal;Tactile;Demo  -SP     Time 3 -- 10 minutes  -SP        Exercise 4    Exercise Name 4 -- supine HS stretch with sheet/supine HS stretch with adduction  -SP     Cueing 4 -- Verbal;Tactile;Demo  -SP     Reps 4 -- 10/10  -SP     Time 4 -- 10 sec  -SP        Exercise 5    Exercise Name 5 -- QS over single towel roll  -SP     Cueing 5 -- Verbal;Tactile;Demo  -SP     Sets 5 -- Guatemalan STIM (10/10)  -SP        Exercise 6    Exercise Name 6 -- QS (ankle)  -SP     Cueing 6 -- Verbal;Tactile  -SP     Reps 6 -- 25  -SP     Time 6 -- 5 sec  -SP        Exercise 7    Exercise Name 7 -- S/L hip abduction  -SP     Cueing 7 -- Verbal;Tactile;Demo  -SP        Exercise 8    Exercise Name 8 -- SLR  -SP     Cueing 8 -- Verbal;Tactile;Demo  -SP        Exercise 9    Exercise Name 9 -- Prone hip extension  -SP     Cueing 9 -- Verbal  -SP        Exercise 10    Exercise Name 10 -- S/L hip adduction  -SP     Cueing 10 -- Verbal;Tactile;Demo  -SP        Exercise 11    Exercise Name 11 -- Heel prop  -SP     Cueing 11 -- Verbal;Tactile  -SP     Time 11 -- 5  -SP     Additional Comments -- 3# above knee  -SP        Exercise 12    Exercise Name 12 -- Prone Leg Hang  -SP     Cueing 12 -- Verbal;Tactile;Demo  -SP        Exercise 13    Exercise Name 13 -- TKE  -SP     Cueing 13 -- Verbal  -SP        Exercise 14    Exercise Name 14 -- standing calf raises  -SP     Cueing 14 -- Verbal;Tactile;Demo  -SP        Exercise 15    Exercise Name 15 -- standing Hamstring curl  -SP     Cueing 15 -- Verbal;Tactile;Demo  -SP        Exercise 16    Exercise Name 16 -- Forward step ups on 6 inch step  -SP     Cueing 16 -- Verbal;Tactile;Demo  -SP        Exercise 17    Exercise Name 17 -- knee flexion stretch on stair  -SP     Cueing 17 -- Verbal;Demo  -SP     Reps 17 -- 5  -SP     Time 17 -- 10 sec  -SP               User Key  (r) = Recorded By, (t) = Taken By, (c) = Cosigned By       Initials Name Provider Type    Carol Hwang, PT Physical Therapist                             Manual Rx (Last 36 Hours)       Manual Treatments       Row Name 04/17/24 1656 04/17/24 1600          Total Minutes    28100 - PT Manual Therapy Minutes 15  -SP --        Manual Rx 2    Manual Rx 2 Location -- Seated passive left knee flexion  -SP     Manual Rx 2 Duration -- 10 x 10 sec  -SP        Manual Rx 3    Manual Rx 3 Location -- Posterior tibial mobs  -SP     Manual Rx 3 Duration -- 3 x 5 osc gentle  -SP        Manual Rx 4    Manual Rx 4 Location -- left knee  -SP     Manual Rx 4 Type -- Passive knee extension stretch  15 sec x 5  -SP     Manual Rx 4 Duration -- 5 min  -SP               User Key  (r) = Recorded By, (t) = Taken By, (c) = Cosigned By      Initials Name Provider Type    Carol Hwang, PT Physical Therapist                        Therapy Education  Given: HEP  Program: Reinforced  How Provided: Verbal  Provided to: Patient  Level of Understanding: Verbalized, Demonstrated              Time Calculation:   Start Time: 1525  Stop Time: 1700  Time Calculation (min): 95 min  Timed Charges  81445 - PT Therapeutic Exercise Minutes: 15  25522 - PT Manual Therapy Minutes: 15  Untimed Charges  PT Moist Heat Minutes: 8  PT Ice Rx Minutes: 10  Total Minutes  Timed Charges Total Minutes: 30  Untimed Charges Total Minutes: 18   Total Minutes: 30  Therapy Charges for Today       Code Description Service Date Service Provider Modifiers Qty    22514705753 HC PT THER PROC EA 15 MIN 4/17/2024 Carol Enrique, PT GP 1    18836970168 HC PT MANUAL THERAPY EA 15 MIN 4/17/2024 Carol Enrique, PT GP 1                      Carol Enrique PT  4/17/2024

## 2024-04-18 ENCOUNTER — HOSPITAL ENCOUNTER (OUTPATIENT)
Dept: PHYSICAL THERAPY | Facility: HOSPITAL | Age: 44
Setting detail: THERAPIES SERIES
Discharge: HOME | End: 2024-04-18
Payer: COMMERCIAL

## 2024-04-18 DIAGNOSIS — Z96.652 STATUS POST LEFT PARTIAL KNEE REPLACEMENT: Primary | ICD-10-CM

## 2024-04-18 DIAGNOSIS — M17.0 BILATERAL PRIMARY OSTEOARTHRITIS OF KNEE: ICD-10-CM

## 2024-04-18 RX ORDER — OXYCODONE AND ACETAMINOPHEN 7.5; 325 MG/1; MG/1
1 TABLET ORAL EVERY 8 HOURS PRN
Qty: 40 TABLET | Refills: 0 | Status: CANCELLED | OUTPATIENT
Start: 2024-04-18

## 2024-04-18 NOTE — THERAPY TREATMENT NOTE
"  Outpatient Physical Therapy Ortho Treatment Note   Jazz Bullock     Patient Name: Aida Peralta  : 1980  MRN: [id number]  Today's Date: 2024      Visit Date: 2024    Visit Dx:    ICD-10-CM ICD-9-CM   1. Status post left partial knee replacement  Z96.652 V43.65       Patient Active Problem List   Diagnosis    YULIA (generalized anxiety disorder)    History of seizure    Hypertension    Hypothyroidism    Primary osteoarthritis of both knees    Morbid obesity    Plica of knee, left    Adjustment disorder    Status post right partial knee replacement    Status post left partial knee replacement        Past Medical History:   Diagnosis Date    CTS (carpal tunnel syndrome)     GERD (gastroesophageal reflux disease)     Hypertension     Migraines     Plica of knee, left 10/22/2021    Primary osteoarthritis of both knees 10/22/2021    Seizure     one in  , no meds    Sleep apnea     no machine        Past Surgical History:   Procedure Laterality Date    CHOLECYSTECTOMY      KNEE ARTHROPLASTY, PARTIAL REPLACEMENT Right 2023    Procedure: KNEE ARTHROPLASTY, PARTIAL REPLACEMENT;  Surgeon: Siddhartha Pretty MD;  Location: [facility];  Service: Robotics - Ortho;  Laterality: Right;    TOTAL KNEE ARTHROPLASTY Left 2023    Procedure: KNEE ARTHROPLASTY UNICOMPARTMENTAL;  Surgeon: Siddhartha Pretty MD;  Location: [facility];  Service: Orthopedics;  Laterality: Left;    TUBAL ABDOMINAL LIGATION          PT Ortho       Row Name 24 1500       Subjective    Subjective Comments pt reports her knee is actually hurting today  -       Subjective Pain    Able to rate subjective pain? yes  -MH    Pre-Treatment Pain Level 4  -    Subjective Pain Comment No rating given but pt states knee \"feels much better\" post Rx  -MH       Left Lower Ext    Lt Knee Extension/Flexion PROM 0 - 6 - 97 degrees, post stretches  -MH              User Key  (r) = Recorded By, (t) = Taken By, (c) = Cosigned By      " "Initials Name Provider Type     Edi Winchester PTA Physical Therapist Assistant                                 PT Assessment/Plan       Row Name 04/18/24 1634          PT Assessment    Assessment Comments pt ambulates into clinc with increased knee ROM but reporting increased pain today; able to tolerate stretches and ex's as usual; slowly seeing gains in ROM and improved gait  -        PT Plan    PT Plan Comments Cont per POC, focus on ROM  -               User Key  (r) = Recorded By, (t) = Taken By, (c) = Cosigned By      Initials Name Provider Type     Edi Winchester PTA Physical Therapist Assistant                     Modalities       Row Name 04/18/24 1500             Precautions    Existing Precautions/Restrictions no known precautions/restrictions  -         Moist Heat    Location left thigh and knee with patient in short sit on edge of table with stool (weighted) at ankle for static knee flexion stretch  -      PT Moist Heat Minutes 8  8 minutes of heat only prior to starting heat/stretch - pt supine  -         Ice    Location anterior and posterior L knee with patient supine.  -      PT Ice Rx Minutes 10  -      Ice S/P Rx Yes  -      Patient reports will apply ice at home to involved area Yes  -                User Key  (r) = Recorded By, (t) = Taken By, (c) = Cosigned By      Initials Name Provider Type     Edi Winchester PTA Physical Therapist Assistant                   OP Exercises       Row Name 04/18/24 1500             Precautions    Existing Precautions/Restrictions no known precautions/restrictions  -         Subjective    Subjective Comments pt reports her knee is actually hurting today  -         Subjective Pain    Able to rate subjective pain? yes  -      Pre-Treatment Pain Level 4  -      Subjective Pain Comment No rating given but pt states knee \"feels much better\" post Rx  -         Exercise 1    Exercise Name 1 Heelslides with sheet  -      Cueing " 1 Verbal;Tactile;Demo  -MH      Time 1 8 minutes  -MH         Exercise 2    Exercise Name 2 Wallslides  -MH      Cueing 2 Verbal;Tactile;Demo  -MH      Time 2 8 minutes  -MH         Exercise 3    Exercise Name 3 Bike- airdyne seat 2  -MH      Cueing 3 Verbal;Tactile;Demo  -MH      Time 3 10 minutes  -MH         Exercise 4    Exercise Name 4 knee flexion stretch - on steps  -MH      Cueing 4 Verbal  -MH      Reps 4 5  -MH      Time 4 10 sec  -MH         Exercise 5    Exercise Name 5 supine HS stretch with sheet  -MH      Cueing 5 Verbal;Tactile;Demo  -MH      Reps 5 10  -MH      Time 5 10 sec  -MH         Exercise 6    Exercise Name 6 heel prop  -MH      Cueing 6 Verbal  -MH      Reps 6 3# above knee  -MH      Time 6 5 min  -MH         Exercise 7    Exercise Name 7 QS  -MH      Cueing 7 Verbal  -MH      Reps 7 15  -MH      Time 7 5 sec  -MH                User Key  (r) = Recorded By, (t) = Taken By, (c) = Cosigned By      Initials Name Provider Type    Edi Moncada PTA Physical Therapist Assistant                             Manual Rx (Last 36 Hours)       Manual Treatments       Row Name 04/18/24 1500             Manual Rx 1    Manual Rx 1 Location (L) knee - pt seated  -MH      Manual Rx 1 Type PROM:  flexion  -MH      Manual Rx 1 Grade post tib mobs throughout  -MH      Manual Rx 1 Duration 10 x 10 sec  -MH         Manual Rx 2    Manual Rx 2 Location (L) knee - pt supine  -MH      Manual Rx 2 Type PROM: extension  -MH      Manual Rx 2 Duration 10 x 10 sec  -MH                User Key  (r) = Recorded By, (t) = Taken By, (c) = Cosigned By      Initials Name Provider Type    Edi Moncada PTA Physical Therapist Assistant                        Therapy Education  Given: HEP  Program: Reinforced  How Provided: Verbal  Provided to: Patient  Level of Understanding: Verbalized, Demonstrated              Time Calculation:   Start Time: 1501  Stop Time: 1641  Time Calculation (min): 100 min  Untimed Charges  PT  Moist Heat Minutes: 8 (8 minutes of heat only prior to starting heat/stretch - pt supine)  PT Ice Rx Minutes: 10  Total Minutes  Untimed Charges Total Minutes: 18   Total Minutes: 18                Edi Winchester, ALESIA  4/18/2024

## 2024-04-18 NOTE — TELEPHONE ENCOUNTER
I called and S/w patient to let her know that she needs to call Dr. Pretty's office for her pain medication refill. Patient understood.

## 2024-04-19 ENCOUNTER — HOSPITAL ENCOUNTER (OUTPATIENT)
Dept: PHYSICAL THERAPY | Facility: HOSPITAL | Age: 44
Setting detail: THERAPIES SERIES
Discharge: HOME | End: 2024-04-19
Payer: COMMERCIAL

## 2024-04-19 DIAGNOSIS — Z96.652 STATUS POST LEFT PARTIAL KNEE REPLACEMENT: Primary | ICD-10-CM

## 2024-04-19 PROCEDURE — 97140 MANUAL THERAPY 1/> REGIONS: CPT

## 2024-04-19 NOTE — THERAPY TREATMENT NOTE
"  Outpatient Physical Therapy Ortho Treatment Note   Jazz Bullock     Patient Name: Aida Peralta  : 1980  MRN: [id number]  Today's Date: 2024      Visit Date: 2024    Visit Dx:    ICD-10-CM ICD-9-CM   1. Status post left partial knee replacement  Z96.652 V43.65       Patient Active Problem List   Diagnosis    YULIA (generalized anxiety disorder)    History of seizure    Hypertension    Hypothyroidism    Primary osteoarthritis of both knees    Morbid obesity    Plica of knee, left    Adjustment disorder    Status post right partial knee replacement    Status post left partial knee replacement        Past Medical History:   Diagnosis Date    CTS (carpal tunnel syndrome)     GERD (gastroesophageal reflux disease)     Hypertension     Migraines     Plica of knee, left 10/22/2021    Primary osteoarthritis of both knees 10/22/2021    Seizure     one in  , no meds    Sleep apnea     no machine        Past Surgical History:   Procedure Laterality Date    CHOLECYSTECTOMY      KNEE ARTHROPLASTY, PARTIAL REPLACEMENT Right 2023    Procedure: KNEE ARTHROPLASTY, PARTIAL REPLACEMENT;  Surgeon: Siddhartha Pretty MD;  Location: [facility] OR;  Service: Robotics - Ortho;  Laterality: Right;    TOTAL KNEE ARTHROPLASTY Left 2023    Procedure: KNEE ARTHROPLASTY UNICOMPARTMENTAL;  Surgeon: Siddhartha Pretty MD;  Location: [facility] OR;  Service: Orthopedics;  Laterality: Left;    TUBAL ABDOMINAL LIGATION          PT Ortho       Row Name 24 1400       Subjective    Subjective Comments Pt reports her knee is a little achey but \"not bad.\" \"I have been in the car a lot today.\" \"It feels a lot better than it did before the manipulation.\"  -LN       Subjective Pain    Able to rate subjective pain? yes  -LN    Pre-Treatment Pain Level 3  -LN       Left Lower Ext    Lt Knee Extension/Flexion PROM 0-6-93 degrees post stretch  -LN              User Key  (r) = Recorded By, (t) = Taken By, (c) = Cosigned By      " "Initials Name Provider Type    Gloria Wolf, PT Physical Therapist                                 PT Assessment/Plan       Row Name 04/19/24 1500          PT Assessment    Assessment Comments Pt with overall decreased knee pain, but knee flexion still limited by pain and tightness but better since having manipulation. Knee flexion a little less today which I feel may be from her being in car most of day today at work. Also still with limited knee extension. She is ambulating better overall with decreased antalgic gait.  -LN        PT Plan    PT Plan Comments Cont per POC, focus on ROM  -LN               User Key  (r) = Recorded By, (t) = Taken By, (c) = Cosigned By      Initials Name Provider Type    Gloria Wolf, PT Physical Therapist                     Modalities       Row Name 04/19/24 1400             Moist Heat    MH Applied Yes  -LN      Location left thigh and knee with patient in short sit on edge of table with stool (weighted) at ankle for static knee flexion stretch  -LN      PT Moist Heat Minutes 8  8 minutes of heat only prior to starting heat/stretch - pt supine  -LN         Ice    Ice Applied Yes  -LN      Location anterior and posterior L knee with patient supine.  -LN      PT Ice Rx Minutes 10  -LN      Ice S/P Rx Yes  -LN      Patient reports will apply ice at home to involved area Yes  -LN                User Key  (r) = Recorded By, (t) = Taken By, (c) = Cosigned By      Initials Name Provider Type    Gloria Wolf, PT Physical Therapist                   OP Exercises       Row Name 04/19/24 1500 04/19/24 1400          Precautions    Existing Precautions/Restrictions -- no known precautions/restrictions  -LN        Subjective    Subjective Comments -- Pt reports her knee is a little achey but \"not bad.\" \"I have been in the car a lot today.\" \"It feels a lot better than it did before the manipulation.\"  -LN        Subjective Pain    Able to rate subjective " pain? -- yes  -LN     Pre-Treatment Pain Level -- 3  -LN        Total Minutes    93857 - PT Therapeutic Exercise Minutes 15  -LN --     68351 - PT Manual Therapy Minutes 15  -LN --        Exercise 1    Exercise Name 1 Heelslides with sheet  -LN --     Cueing 1 Verbal;Tactile;Demo  -LN --     Time 1 8 minutes  -LN --        Exercise 2    Exercise Name 2 Wallslides  -LN --     Cueing 2 Verbal;Tactile;Demo  -LN --     Time 2 8 minutes  -LN --        Exercise 3    Exercise Name 3 Bike- airdyne seat 2  -LN --     Cueing 3 Verbal;Tactile;Demo  -LN --     Time 3 10 minutes  -LN --        Exercise 4    Exercise Name 4 knee flexion stretch - on steps  -LN --     Cueing 4 Verbal  -LN --     Reps 4 5  -LN --     Time 4 10 sec  -LN --        Exercise 5    Exercise Name 5 supine HS stretch with sheet  -LN --     Cueing 5 Verbal;Tactile;Demo  -LN --     Reps 5 10  -LN --     Time 5 10 sec  -LN --        Exercise 6    Exercise Name 6 heel prop  -LN --     Cueing 6 Verbal  -LN --     Reps 6 3# above knee  -LN --     Time 6 5 min  -LN --        Exercise 7    Exercise Name 7 QS  -LN --     Cueing 7 Verbal  -LN --     Reps 7 15  -LN --     Time 7 5 sec  -LN --               User Key  (r) = Recorded By, (t) = Taken By, (c) = Cosigned By      Initials Name Provider Type    LN Gloria Kan, PT Physical Therapist                             Manual Rx (Last 36 Hours)       Manual Treatments       Row Name 04/19/24 1500             Total Minutes    93101 - PT Manual Therapy Minutes 15  -LN         Manual Rx 1    Manual Rx 1 Location (L) knee - pt seated  -LN      Manual Rx 1 Type PROM:  flexion  -LN      Manual Rx 1 Grade post tib mobs throughout  -LN      Manual Rx 1 Duration 10 x 10 sec  -LN         Manual Rx 2    Manual Rx 2 Location (L) knee - pt supine  -LN      Manual Rx 2 Type PROM: extension  -LN      Manual Rx 2 Duration 10 x 10 sec  -LN                User Key  (r) = Recorded By, (t) = Taken By, (c) = Cosigned By       Initials Name Provider Type    Gloria Wolf, PT Physical Therapist                        Therapy Education  Given: HEP, Symptoms/condition management, Edema management  Program: Reinforced  How Provided: Verbal  Provided to: Patient  Level of Understanding: Verbalized, Demonstrated              Time Calculation:   Start Time: 1500  Stop Time: 1640  Time Calculation (min): 100 min  Timed Charges  94575 - PT Therapeutic Exercise Minutes: 15  93007 - PT Manual Therapy Minutes: 15  Untimed Charges  PT Moist Heat Minutes: 8 (8 minutes of heat only prior to starting heat/stretch - pt supine)  PT Ice Rx Minutes: 10  Total Minutes  Timed Charges Total Minutes: 30  Untimed Charges Total Minutes: 18   Total Minutes: 30                Gloria Kan, PT  4/19/2024

## 2024-04-22 ENCOUNTER — HOSPITAL ENCOUNTER (OUTPATIENT)
Dept: PHYSICAL THERAPY | Facility: HOSPITAL | Age: 44
Setting detail: THERAPIES SERIES
Discharge: HOME | End: 2024-04-22
Payer: COMMERCIAL

## 2024-04-22 DIAGNOSIS — Z96.652 STATUS POST LEFT PARTIAL KNEE REPLACEMENT: Primary | ICD-10-CM

## 2024-04-22 NOTE — THERAPY TREATMENT NOTE
"  Outpatient Physical Therapy Ortho Treatment Note   Jazz Bullock     Patient Name: Aida Peralta  : 1980  MRN: [id number]  Today's Date: 2024      Visit Date: 2024    Visit Dx:    ICD-10-CM ICD-9-CM   1. Status post left partial knee replacement  Z96.652 V43.65       Patient Active Problem List   Diagnosis    YULIA (generalized anxiety disorder)    History of seizure    Hypertension    Hypothyroidism    Primary osteoarthritis of both knees    Morbid obesity    Plica of knee, left    Adjustment disorder    Status post right partial knee replacement    Status post left partial knee replacement        Past Medical History:   Diagnosis Date    CTS (carpal tunnel syndrome)     GERD (gastroesophageal reflux disease)     Hypertension     Migraines     Plica of knee, left 10/22/2021    Primary osteoarthritis of both knees 10/22/2021    Seizure     one in  , no meds    Sleep apnea     no machine        Past Surgical History:   Procedure Laterality Date    CHOLECYSTECTOMY      KNEE ARTHROPLASTY, PARTIAL REPLACEMENT Right 2023    Procedure: KNEE ARTHROPLASTY, PARTIAL REPLACEMENT;  Surgeon: Siddhartha Pretty MD;  Location: [facility] OR;  Service: Robotics - Ortho;  Laterality: Right;    TOTAL KNEE ARTHROPLASTY Left 2023    Procedure: KNEE ARTHROPLASTY UNICOMPARTMENTAL;  Surgeon: Siddhartha Pretty MD;  Location: [facility] OR;  Service: Orthopedics;  Laterality: Left;    TUBAL ABDOMINAL LIGATION          PT Ortho       Row Name 24 1500       Subjective    Subjective Comments \"My knee was a little sore with being in the car today at work but it isn't hurting right now; only feels stiff.\"  -LN       Subjective Pain    Able to rate subjective pain? yes  -LN    Pre-Treatment Pain Level 0  -LN              User Key  (r) = Recorded By, (t) = Taken By, (c) = Cosigned By      Initials Name Provider Type    Gloria Wolf, PT Physical Therapist                                 PT " "Assessment/Plan       Row Name 04/22/24 1600          PT Assessment    Assessment Comments Pt with overall decreased knee pain & reported no pain in knee today; only stiffness. Knee flexion still limited by pain and tightness but better since having manipulation.  She is ambulating better overall with decreased antalgic gait and no SPC used today.  -LN        PT Plan    PT Plan Comments Cont per POC, focus on ROM  -LN               User Key  (r) = Recorded By, (t) = Taken By, (c) = Cosigned By      Initials Name Provider Type    Gloria Wolf, PT Physical Therapist                     Modalities       Row Name 04/22/24 1500             Moist Heat    MH Applied Yes  -LN      Location left thigh and knee with patient in short sit on edge of table with stool (weighted) at ankle for static knee flexion stretch  -LN      PT Moist Heat Minutes 8  8 minutes of heat only prior to starting heat/stretch - pt supine  -LN         Ice    Ice Applied --  -LN      Location --  -LN      PT Ice Rx Minutes --  -LN      Ice S/P Rx --  -LN      Patient denies application of Ice Yes  -LN      Patient reports will apply ice at home to involved area Yes  -LN                User Key  (r) = Recorded By, (t) = Taken By, (c) = Cosigned By      Initials Name Provider Type    Gloria Wolf, PT Physical Therapist                   OP Exercises       Row Name 04/22/24 1500             Precautions    Existing Precautions/Restrictions no known precautions/restrictions  -LN         Subjective    Subjective Comments \"My knee was a little sore with being in the car today at work but it isn't hurting right now; only feels stiff.\"  -LN         Subjective Pain    Able to rate subjective pain? yes  -LN      Pre-Treatment Pain Level 0  -LN         Total Minutes    33414 - PT Therapeutic Exercise Minutes 15  -LN      13103 - PT Manual Therapy Minutes 15  -LN         Exercise 1    Exercise Name 1 Heelslides with sheet  -LN      " Cueing 1 Verbal;Tactile;Demo  -LN      Time 1 8 minutes  -LN         Exercise 2    Exercise Name 2 Wallslides  -LN      Cueing 2 Verbal;Tactile;Demo  -LN      Time 2 8 minutes  -LN         Exercise 3    Exercise Name 3 Bike- airdyne seat 2  -LN      Cueing 3 Verbal;Tactile;Demo  -LN      Time 3 10 minutes  -LN         Exercise 4    Exercise Name 4 knee flexion stretch - on steps  -LN      Cueing 4 Verbal  -LN      Reps 4 5  -LN      Time 4 10 sec  -LN         Exercise 5    Exercise Name 5 supine HS stretch with sheet  -LN      Cueing 5 Verbal;Tactile;Demo  -LN      Reps 5 10  -LN      Time 5 10 sec  -LN         Exercise 6    Exercise Name 6 heel prop  -LN      Cueing 6 Verbal  -LN      Reps 6 3# above knee  -LN      Time 6 5 min  -LN         Exercise 7    Exercise Name 7 QS  -LN      Cueing 7 Verbal  -LN      Reps 7 15  -LN      Time 7 5 sec  -LN                User Key  (r) = Recorded By, (t) = Taken By, (c) = Cosigned By      Initials Name Provider Type    LN Gloria Kan, PT Physical Therapist                             Manual Rx (Last 36 Hours)       Manual Treatments       Row Name 04/22/24 1500             Total Minutes    75164 - PT Manual Therapy Minutes 15  -LN         Manual Rx 1    Manual Rx 1 Location (L) knee - pt seated  -LN      Manual Rx 1 Type PROM:  flexion  -LN      Manual Rx 1 Grade post tib mobs throughout  -LN      Manual Rx 1 Duration 10 x 10 sec  -LN         Manual Rx 2    Manual Rx 2 Location (L) knee - pt supine  -LN      Manual Rx 2 Type PROM: extension  -LN      Manual Rx 2 Duration 10 x 10 sec  -LN                User Key  (r) = Recorded By, (t) = Taken By, (c) = Cosigned By      Initials Name Provider Type    LN Gloria Kan, PT Physical Therapist                        Therapy Education  Given: HEP, Symptoms/condition management, Edema management  Program: Reinforced  How Provided: Verbal  Provided to: Patient  Level of Understanding: Verbalized, Demonstrated               Time Calculation:   Start Time: 1503  Stop Time: 1620  Time Calculation (min): 77 min  Timed Charges  19195 - PT Therapeutic Exercise Minutes: 15  81425 - PT Manual Therapy Minutes: 15  Untimed Charges  PT Moist Heat Minutes: 8 (8 minutes of heat only prior to starting heat/stretch - pt supine)  Total Minutes  Timed Charges Total Minutes: 30  Untimed Charges Total Minutes: 8   Total Minutes: 38                Gloria Kan, PT  4/22/2024

## 2024-04-23 ENCOUNTER — HOSPITAL ENCOUNTER (OUTPATIENT)
Dept: PHYSICAL THERAPY | Facility: HOSPITAL | Age: 44
Setting detail: THERAPIES SERIES
Discharge: HOME | End: 2024-04-23
Payer: COMMERCIAL

## 2024-04-23 DIAGNOSIS — Z96.652 STATUS POST LEFT PARTIAL KNEE REPLACEMENT: Primary | ICD-10-CM

## 2024-04-23 NOTE — THERAPY TREATMENT NOTE
Outpatient Physical Therapy Ortho Treatment Note   Jazz Bullock     Patient Name: Aida Peralta  : 1980  MRN: [id number]  Today's Date: 2024      Visit Date: 2024    Visit Dx:    ICD-10-CM ICD-9-CM   1. Status post left partial knee replacement  Z96.652 V43.65       Patient Active Problem List   Diagnosis    YULIA (generalized anxiety disorder)    History of seizure    Hypertension    Hypothyroidism    Primary osteoarthritis of both knees    Morbid obesity    Plica of knee, left    Adjustment disorder    Status post right partial knee replacement    Status post left partial knee replacement        Past Medical History:   Diagnosis Date    CTS (carpal tunnel syndrome)     GERD (gastroesophageal reflux disease)     Hypertension     Migraines     Plica of knee, left 10/22/2021    Primary osteoarthritis of both knees 10/22/2021    Seizure     one in  2018, no meds    Sleep apnea     no machine        Past Surgical History:   Procedure Laterality Date    CHOLECYSTECTOMY      KNEE ARTHROPLASTY, PARTIAL REPLACEMENT Right 2023    Procedure: KNEE ARTHROPLASTY, PARTIAL REPLACEMENT;  Surgeon: Siddhartha Pretty MD;  Location: [facility] OR;  Service: Robotics - Ortho;  Laterality: Right;    TOTAL KNEE ARTHROPLASTY Left 2023    Procedure: KNEE ARTHROPLASTY UNICOMPARTMENTAL;  Surgeon: Siddhartha Pretty MD;  Location: [facility] OR;  Service: Orthopedics;  Laterality: Left;    TUBAL ABDOMINAL LIGATION          PT Ortho       Row Name 24 1500       Subjective    Subjective Comments pt reports her walking seems a little better but reports she ended up acing her knee today because her knee started swelling  -       Left Lower Ext    Lt Knee Extension/Flexion PROM 0 - 3- 91, post stretches  -              User Key  (r) = Recorded By, (t) = Taken By, (c) = Cosigned By      Initials Name Provider Type    Edi Moncada, PTA Physical Therapist Assistant                                 PT  Assessment/Plan       Row Name 04/23/24 1635          PT Assessment    Assessment Comments overall decreased knee pain and improved tolerance to PROM; pt tolerated ex's well; did have decreased knee flexion ROM but increased knee extension this session - explained to pt that flexion decrease is likely due to increase edema  -        PT Plan    PT Plan Comments Cont per POC, push ROM  -               User Key  (r) = Recorded By, (t) = Taken By, (c) = Cosigned By      Initials Name Provider Type     Edi Winchester PTA Physical Therapist Assistant                     Modalities       Row Name 04/23/24 1500             Precautions    Existing Precautions/Restrictions no known precautions/restrictions  -         Moist Heat    Location left thigh and knee with patient in short sit on edge of table with stool (weighted) at ankle for static knee flexion stretch  -      PT Moist Heat Minutes 8  8 minutes of heat only prior to starting heat/stretch - pt supine  -         Ice    Location anterior and posterior L knee with patient supine.  -      PT Ice Rx Minutes 10  -      Ice S/P Rx Yes  -                User Key  (r) = Recorded By, (t) = Taken By, (c) = Cosigned By      Initials Name Provider Type     Edi Winchester PTA Physical Therapist Assistant                   OP Exercises       Row Name 04/23/24 1639 04/23/24 1500          Precautions    Existing Precautions/Restrictions -- no known precautions/restrictions  -        Subjective    Subjective Comments -- pt reports her walking seems a little better but reports she ended up acing her knee today because her knee started swelling  -        Total Minutes    17983 - PT Therapeutic Exercise Minutes 30  - --     93759 - PT Manual Therapy Minutes 20  - --        Exercise 1    Exercise Name 1 -- Heelslides with sheet  -     Cueing 1 -- Verbal;Tactile;Demo  -     Time 1 -- 8 minutes  -        Exercise 2    Exercise Name 2 -- Wallslides  -      Cueing 2 -- Verbal;Tactile;Demo  -MH     Time 2 -- 8 minutes  -MH        Exercise 3    Exercise Name 3 -- Bike- airdyne seat 2  -MH     Cueing 3 -- Verbal;Tactile;Demo  -MH     Time 3 -- 10 minutes  -MH        Exercise 4    Exercise Name 4 -- knee flexion stretch - on steps  -MH     Cueing 4 -- Verbal  -MH     Reps 4 -- 5  -MH     Time 4 -- 10 sec  -MH        Exercise 5    Exercise Name 5 -- supine HS stretch with sheet  -MH     Cueing 5 -- Verbal;Tactile;Demo  -MH     Reps 5 -- 10  -MH     Time 5 -- 10 sec  -MH        Exercise 6    Exercise Name 6 -- heel prop  -MH     Cueing 6 -- Verbal  -MH     Reps 6 -- 3# above knee  -MH     Time 6 -- 5 min  -MH        Exercise 7    Exercise Name 7 -- QS  -MH     Cueing 7 -- Verbal  -MH     Reps 7 -- 15  -MH     Time 7 -- 5 sec  -MH               User Key  (r) = Recorded By, (t) = Taken By, (c) = Cosigned By      Initials Name Provider Type    Edi Moncada PTA Physical Therapist Assistant                             Manual Rx (Last 36 Hours)       Manual Treatments       Row Name 04/23/24 1639 04/23/24 1500          Total Minutes    94882 - PT Manual Therapy Minutes 20  -MH --        Manual Rx 1    Manual Rx 1 Location -- (L) knee - pt seated  -     Manual Rx 1 Type -- PROM:  flexion  -     Manual Rx 1 Grade -- post tib mobs throughout  -     Manual Rx 1 Duration -- 10 x 10 sec  -MH        Manual Rx 2    Manual Rx 2 Location -- (L) knee - pt supine  -     Manual Rx 2 Type -- PROM: extension  -     Manual Rx 2 Duration -- 10 x 10 sec  -MH               User Key  (r) = Recorded By, (t) = Taken By, (c) = Cosigned By      Initials Name Provider Type    Edi Moncada PTA Physical Therapist Assistant                        Therapy Education  Given: HEP, Symptoms/condition management  Program: Reinforced  How Provided: Verbal  Provided to: Patient  Level of Understanding: Verbalized, Demonstrated              Time Calculation:   Start Time: 1455  Stop Time:  1637  Time Calculation (min): 102 min  Timed Charges  15249 - PT Therapeutic Exercise Minutes: 30  61805 - PT Manual Therapy Minutes: 20  Untimed Charges  PT Moist Heat Minutes: 8 (8 minutes of heat only prior to starting heat/stretch - pt supine)  PT Ice Rx Minutes: 10  Total Minutes  Timed Charges Total Minutes: 50  Untimed Charges Total Minutes: 18   Total Minutes: 50                Edi Winchester, PTA  4/23/2024

## 2024-04-24 ENCOUNTER — HOSPITAL ENCOUNTER (OUTPATIENT)
Dept: PHYSICAL THERAPY | Facility: HOSPITAL | Age: 44
Setting detail: THERAPIES SERIES
Discharge: HOME | End: 2024-04-24
Payer: COMMERCIAL

## 2024-04-24 DIAGNOSIS — Z96.652 STATUS POST LEFT PARTIAL KNEE REPLACEMENT: Primary | ICD-10-CM

## 2024-04-24 NOTE — THERAPY TREATMENT NOTE
"  Outpatient Physical Therapy Ortho Treatment Note   Jazz Bullock     Patient Name: Aida Peralta  : 1980  MRN: [id number]  Today's Date: 2024      Visit Date: 2024    Visit Dx:    ICD-10-CM ICD-9-CM   1. Status post left partial knee replacement  Z96.652 V43.65       Patient Active Problem List   Diagnosis    YULIA (generalized anxiety disorder)    History of seizure    Hypertension    Hypothyroidism    Primary osteoarthritis of both knees    Morbid obesity    Plica of knee, left    Adjustment disorder    Status post right partial knee replacement    Status post left partial knee replacement        Past Medical History:   Diagnosis Date    CTS (carpal tunnel syndrome)     GERD (gastroesophageal reflux disease)     Hypertension     Migraines     Plica of knee, left 10/22/2021    Primary osteoarthritis of both knees 10/22/2021    Seizure     one in  , no meds    Sleep apnea     no machine        Past Surgical History:   Procedure Laterality Date    CHOLECYSTECTOMY      KNEE ARTHROPLASTY, PARTIAL REPLACEMENT Right 2023    Procedure: KNEE ARTHROPLASTY, PARTIAL REPLACEMENT;  Surgeon: Siddhartha Pretty MD;  Location: [facility] MAIN OR;  Service: Robotics - Ortho;  Laterality: Right;    TOTAL KNEE ARTHROPLASTY Left 2023    Procedure: KNEE ARTHROPLASTY UNICOMPARTMENTAL;  Surgeon: Siddhartha Pretty MD;  Location: [facility] MAIN OR;  Service: Orthopedics;  Laterality: Left;    TUBAL ABDOMINAL LIGATION          PT Ortho       Row Name 24 1500       Subjective    Subjective Comments pt reports her knee feels pretty good today \"it actually doesn't feel that tight.\"  Pt reports applying ace wrap at the start of work today  instead of waiting to see if it swelled  -       Left Lower Ext    Lt Knee Extension/Flexion PROM 94 degrees, post stretches  -              User Key  (r) = Recorded By, (t) = Taken By, (c) = Cosigned By      Initials Name Provider Type    Edi Moncada PTA Physical " "Therapist Assistant                                 PT Assessment/Plan       Row Name 04/24/24 1644          PT Assessment    Assessment Comments PROM still limited by pain but decreased resistance noted this session; pt with making slow gains with ROM and decreased edema as compared to yesterday - could be benefit of having ace wrap on during her work day  -        PT Plan    PT Plan Comments Cont per POC,push ROM  -               User Key  (r) = Recorded By, (t) = Taken By, (c) = Cosigned By      Initials Name Provider Type     Edi Winchester PTA Physical Therapist Assistant                     Modalities       Row Name 04/24/24 1500             Precautions    Existing Precautions/Restrictions no known precautions/restrictions  -         Moist Heat    Location left thigh and knee with patient in short sit on edge of table with stool (weighted) at ankle for static knee flexion stretch  -      PT Moist Heat Minutes 8  8 minutes of heat only prior to starting heat/stretch - pt supine  -         Ice    Location anterior and posterior L knee with patient supine.  -      PT Ice Rx Minutes 10  -      Ice S/P Rx Yes  -                User Key  (r) = Recorded By, (t) = Taken By, (c) = Cosigned By      Initials Name Provider Type     Edi Winchester PTA Physical Therapist Assistant                   OP Exercises       Row Name 04/24/24 1646 04/24/24 1500          Precautions    Existing Precautions/Restrictions -- no known precautions/restrictions  -        Subjective    Subjective Comments -- pt reports her knee feels pretty good today \"it's actually doesn't feel that tight.\"  Pt reports applying ace wrap at the start of work today  instead of waiting to see if it swelled  -        Total Minutes    82503 - PT Therapeutic Exercise Minutes 30  - --     70783 - PT Manual Therapy Minutes 20  - --        Exercise 1    Exercise Name 1 -- Heelslides with sheet  -     Cueing 1 -- " Verbal;Tactile;Demo  -MH     Time 1 -- 8 minutes  -MH        Exercise 2    Exercise Name 2 -- Wallslides  -MH     Cueing 2 -- Verbal;Tactile;Demo  -MH     Time 2 -- 8 minutes  -MH        Exercise 3    Exercise Name 3 -- Bike- airdyne seat 2  -MH     Cueing 3 -- Verbal;Tactile;Demo  -MH     Time 3 -- 8 min  -MH        Exercise 4    Exercise Name 4 -- knee flexion stretch - on steps  -MH     Cueing 4 -- Verbal  -MH     Reps 4 -- 9  -MH     Time 4 -- 10 sec  -MH        Exercise 5    Exercise Name 5 -- supine HS stretch with sheet  -MH     Cueing 5 -- Verbal;Tactile;Demo  -MH     Reps 5 -- 10  -MH     Time 5 -- 10 sec  -MH        Exercise 6    Exercise Name 6 -- heel prop  -MH     Cueing 6 -- Verbal  -MH     Reps 6 -- 3# above knee  -MH     Time 6 -- 5 min  -MH        Exercise 7    Exercise Name 7 -- QS  -MH     Cueing 7 -- Verbal  -MH     Reps 7 -- 20  -MH     Time 7 -- 5 sec  -MH               User Key  (r) = Recorded By, (t) = Taken By, (c) = Cosigned By      Initials Name Provider Type    Edi Moncada PTA Physical Therapist Assistant                             Manual Rx (Last 36 Hours)       Manual Treatments       Row Name 04/24/24 1646 04/24/24 1400          Total Minutes    76511 - PT Manual Therapy Minutes 20  -MH --        Manual Rx 1    Manual Rx 1 Location -- (L) knee - pt seated  -     Manual Rx 1 Type -- PROM:  flexion  -MH     Manual Rx 1 Grade -- post tib mobs throughout  -     Manual Rx 1 Duration -- 10 x 10 sec  -MH        Manual Rx 2    Manual Rx 2 Location -- (L) knee - pt supine  -     Manual Rx 2 Type -- PROM: extension  -MH     Manual Rx 2 Duration -- 10 x 10 sec  -MH               User Key  (r) = Recorded By, (t) = Taken By, (c) = Cosigned By      Initials Name Provider Type    Edi Moncada PTA Physical Therapist Assistant                        Therapy Education  Given: HEP, Symptoms/condition management  Program: Reinforced  How Provided: Verbal  Provided to: Patient  Level  of Understanding: Verbalized, Demonstrated              Time Calculation:   Start Time: 1506  Stop Time: 1642  Time Calculation (min): 96 min  Timed Charges  19107 - PT Therapeutic Exercise Minutes: 30  35276 - PT Manual Therapy Minutes: 20  Untimed Charges  PT Moist Heat Minutes: 8 (8 minutes of heat only prior to starting heat/stretch - pt supine)  PT Ice Rx Minutes: 10  Total Minutes  Timed Charges Total Minutes: 50  Untimed Charges Total Minutes: 18   Total Minutes: 50                Edi Winchester, PTA  4/24/2024

## 2024-04-25 ENCOUNTER — HOSPITAL ENCOUNTER (OUTPATIENT)
Dept: PHYSICAL THERAPY | Facility: HOSPITAL | Age: 44
Setting detail: THERAPIES SERIES
Discharge: HOME | End: 2024-04-25
Payer: COMMERCIAL

## 2024-04-25 DIAGNOSIS — Z96.652 STATUS POST LEFT PARTIAL KNEE REPLACEMENT: Primary | ICD-10-CM

## 2024-04-25 NOTE — THERAPY TREATMENT NOTE
Outpatient Physical Therapy Ortho Treatment Note   Jazz Bullock     Patient Name: Aida Peralta  : 1980  MRN: [id number]  Today's Date: 2024      Visit Date: 2024    Visit Dx:    ICD-10-CM ICD-9-CM   1. Status post left partial knee replacement  Z96.652 V43.65       Patient Active Problem List   Diagnosis    YULIA (generalized anxiety disorder)    History of seizure    Hypertension    Hypothyroidism    Primary osteoarthritis of both knees    Morbid obesity    Plica of knee, left    Adjustment disorder    Status post right partial knee replacement    Status post left partial knee replacement        Past Medical History:   Diagnosis Date    CTS (carpal tunnel syndrome)     GERD (gastroesophageal reflux disease)     Hypertension     Migraines     Plica of knee, left 10/22/2021    Primary osteoarthritis of both knees 10/22/2021    Seizure     one in  , no meds    Sleep apnea     no machine        Past Surgical History:   Procedure Laterality Date    CHOLECYSTECTOMY      KNEE ARTHROPLASTY, PARTIAL REPLACEMENT Right 2023    Procedure: KNEE ARTHROPLASTY, PARTIAL REPLACEMENT;  Surgeon: Siddhartha Pretty MD;  Location: [facility] OR;  Service: Robotics - Ortho;  Laterality: Right;    TOTAL KNEE ARTHROPLASTY Left 2023    Procedure: KNEE ARTHROPLASTY UNICOMPARTMENTAL;  Surgeon: Siddhartha Pretty MD;  Location: [facility] OR;  Service: Orthopedics;  Laterality: Left;    TUBAL ABDOMINAL LIGATION          PT Ortho       Row Name 24 1600       Subjective    Subjective Comments Patient reports no new changes.  She continues to note overall decreased left knee discomfort since manipulation  -SP       Left Lower Ext    Lt Knee Extension/Flexion PROM 94 degrees  -SP              User Key  (r) = Recorded By, (t) = Taken By, (c) = Cosigned By      Initials Name Provider Type    Carol Hwang, PT Physical Therapist                                 PT Assessment/Plan       Row Name  04/25/24 1726          PT Assessment    Assessment Comments Patient with minimal recent change in left knee mobility since knee manipulation.  Patient does continue to report decrease pain in left knee since procedure  -SP        PT Plan    PT Plan Comments Continue to progress ROM;  decrease to 2-3 x week, next week  -SP               User Key  (r) = Recorded By, (t) = Taken By, (c) = Cosigned By      Initials Name Provider Type    Carol Hwang, PT Physical Therapist                     Modalities       Row Name 04/25/24 1600             Moist Heat    MH Applied Yes  -SP      Location left thigh and knee with patient in short sit on edge of table with stool (weighted) at ankle for static knee flexion stretch  -SP      PT Moist Heat Minutes 8  8 minutes of heat only prior to starting heat/stretch - pt supine  -SP         Ice    Ice Applied Yes  -SP      Location anterior and posterior L knee with patient supine.  -SP      PT Ice Rx Minutes 10  -SP      Ice S/P Rx Yes  -SP                User Key  (r) = Recorded By, (t) = Taken By, (c) = Cosigned By      Initials Name Provider Type    Carol Hwang, PT Physical Therapist                   OP Exercises       Row Name 04/25/24 1600             Precautions    Existing Precautions/Restrictions no known precautions/restrictions  -SP         Subjective    Subjective Comments Patient reports no new changes.  She continues to note overall decreased left knee discomfort since manipulation  -SP         Exercise 1    Exercise Name 1 Heelslides with sheet  -SP      Cueing 1 Verbal;Tactile;Demo  -SP      Time 1 8 minutes  -SP         Exercise 2    Exercise Name 2 Wallslides  -SP      Cueing 2 Verbal;Tactile;Demo  -SP      Time 2 8 minutes  -SP         Exercise 3    Exercise Name 3 Bike- airdyne seat 2  -SP      Cueing 3 Verbal;Tactile;Demo  -SP      Time 3 8 min  -SP         Exercise 4    Exercise Name 4 knee flexion stretch - on steps  -SP       Cueing 4 Verbal  -SP      Reps 4 9  -SP      Time 4 10 sec  -SP         Exercise 5    Exercise Name 5 supine HS stretch with sheet  -SP      Cueing 5 Verbal;Tactile;Demo  -SP      Reps 5 10  -SP      Time 5 10 sec  -SP         Exercise 6    Exercise Name 6 heel prop  -SP      Cueing 6 Verbal  -SP      Reps 6 3# above knee  -SP      Time 6 5 min  -SP         Exercise 7    Exercise Name 7 QS  -SP      Cueing 7 Verbal  -SP      Reps 7 20  -SP      Time 7 5 sec  -SP                User Key  (r) = Recorded By, (t) = Taken By, (c) = Cosigned By      Initials Name Provider Type    Carol Hwang, PT Physical Therapist                             Manual Rx (Last 36 Hours)       Manual Treatments       Row Name 04/25/24 1500             Manual Rx 1    Manual Rx 1 Location (L) knee - pt seated  -SP      Manual Rx 1 Type PROM:  flexion  -SP      Manual Rx 1 Grade post tib mobs throughout  -SP      Manual Rx 1 Duration 10 x 10 sec  -SP         Manual Rx 2    Manual Rx 2 Location (L) knee - pt supine  -SP      Manual Rx 2 Type PROM: extension  -SP      Manual Rx 2 Duration 10 x 10 sec  -SP                User Key  (r) = Recorded By, (t) = Taken By, (c) = Cosigned By      Initials Name Provider Type    Carol Hwang, PT Physical Therapist                        Therapy Education  Given: HEP  Program: Reinforced  How Provided: Verbal  Provided to: Patient  Level of Understanding: Verbalized, Demonstrated              Time Calculation:   Start Time: 1457  Stop Time: 1625  Time Calculation (min): 88 min  Untimed Charges  PT Moist Heat Minutes: 8 (8 minutes of heat only prior to starting heat/stretch - pt supine)  PT Ice Rx Minutes: 10  Total Minutes  Untimed Charges Total Minutes: 18   Total Minutes: 18                Carol Enrique PT  4/25/2024

## 2024-04-26 ENCOUNTER — HOSPITAL ENCOUNTER (OUTPATIENT)
Dept: PHYSICAL THERAPY | Facility: HOSPITAL | Age: 44
Setting detail: THERAPIES SERIES
Discharge: HOME | End: 2024-04-26
Payer: COMMERCIAL

## 2024-04-26 DIAGNOSIS — Z96.652 STATUS POST LEFT PARTIAL KNEE REPLACEMENT: Primary | ICD-10-CM

## 2024-04-26 NOTE — THERAPY TREATMENT NOTE
"  Outpatient Physical Therapy Ortho Treatment Note   Jazz Bullock     Patient Name: Aida Peralta  : 1980  MRN: [id number]  Today's Date: 2024      Visit Date: 2024    Visit Dx:    ICD-10-CM ICD-9-CM   1. Status post left partial knee replacement  Z96.652 V43.65       Patient Active Problem List   Diagnosis    YULIA (generalized anxiety disorder)    History of seizure    Hypertension    Hypothyroidism    Primary osteoarthritis of both knees    Morbid obesity    Plica of knee, left    Adjustment disorder    Status post right partial knee replacement    Status post left partial knee replacement        Past Medical History:   Diagnosis Date    CTS (carpal tunnel syndrome)     GERD (gastroesophageal reflux disease)     Hypertension     Migraines     Plica of knee, left 10/22/2021    Primary osteoarthritis of both knees 10/22/2021    Seizure     one in  , no meds    Sleep apnea     no machine        Past Surgical History:   Procedure Laterality Date    CHOLECYSTECTOMY      KNEE ARTHROPLASTY, PARTIAL REPLACEMENT Right 2023    Procedure: KNEE ARTHROPLASTY, PARTIAL REPLACEMENT;  Surgeon: Siddhartha Pretty MD;  Location: [facility];  Service: Robotics - Ortho;  Laterality: Right;    TOTAL KNEE ARTHROPLASTY Left 2023    Procedure: KNEE ARTHROPLASTY UNICOMPARTMENTAL;  Surgeon: Siddhartha Pretty MD;  Location: [facility];  Service: Orthopedics;  Laterality: Left;    TUBAL ABDOMINAL LIGATION          PT Ortho       Row Name 24 1400       Subjective    Subjective Comments pt states she slept well last night but for some reason has had increased pain today - had to take a pain pill  -       Subjective Pain    Able to rate subjective pain? yes  -    Pre-Treatment Pain Level --  5/10 prior to pain pill ; but just \"achey\" since taking meds  -       Left Lower Ext    Lt Knee Extension/Flexion PROM 98 degrees, seated post stretch  -              User Key  (r) = Recorded By, (t) = Taken " "By, (c) = Cosigned By      Initials Name Provider Type     Edi Winchester PTA Physical Therapist Assistant                                 PT Assessment/Plan       Row Name 04/26/24 1624          PT Assessment    Assessment Comments pt with unexplained increase in pain today that she had to manage with pain meds but still tolerated treatment well and demonstrates increased ROM with stretches  -        PT Plan    PT Plan Comments Decrease frequency next week with pt to continue daily HEP  -               User Key  (r) = Recorded By, (t) = Taken By, (c) = Cosigned By      Initials Name Provider Type     Edi Winchester PTA Physical Therapist Assistant                     Modalities       Row Name 04/26/24 1400             Precautions    Existing Precautions/Restrictions no known precautions/restrictions  -         Moist Heat    Location left thigh and knee with patient in short sit on edge of table with stool (weighted) at ankle for static knee flexion stretch  -      PT Moist Heat Minutes 8  8 minutes of heat only prior to starting heat/stretch - pt supine  -         Ice    Location anterior and posterior L knee with patient supine.  -      PT Ice Rx Minutes 10  -      Ice S/P Rx Yes  -                User Key  (r) = Recorded By, (t) = Taken By, (c) = Cosigned By      Initials Name Provider Type     Edi Winchester PTA Physical Therapist Assistant                   OP Exercises       Row Name 04/26/24 1628 04/26/24 1400          Precautions    Existing Precautions/Restrictions -- no known precautions/restrictions  -        Subjective    Subjective Comments -- pt states she slept well last night but for some reason has had increased pain today - had to take a pain pill  -        Subjective Pain    Able to rate subjective pain? -- yes  -     Pre-Treatment Pain Level -- --  5/10 prior to pain pill ; but just \"achey\" since taking meds  -        Total Minutes    17017 - PT Therapeutic " Exercise Minutes 30  -MH --     55738 - PT Manual Therapy Minutes 20  -MH --        Exercise 1    Exercise Name 1 -- Heelslides with sheet  -MH     Cueing 1 -- Verbal;Tactile;Demo  -MH     Time 1 -- 8 minutes  -MH        Exercise 2    Exercise Name 2 -- Wallslides  -MH     Cueing 2 -- Verbal;Tactile;Demo  -MH     Time 2 -- 8 minutes  -MH        Exercise 3    Exercise Name 3 -- Bike- airdyne seat 2  -MH     Cueing 3 -- Verbal;Tactile;Demo  -MH     Time 3 -- 8 min  -MH        Exercise 4    Exercise Name 4 -- knee flexion stretch - on steps  -MH     Cueing 4 -- Verbal  -MH     Reps 4 -- 9  -MH     Time 4 -- 10 sec  -MH        Exercise 5    Exercise Name 5 -- supine HS stretch with sheet  -MH     Cueing 5 -- Verbal;Tactile;Demo  -MH     Reps 5 -- 10  -MH     Time 5 -- 10 sec  -MH        Exercise 6    Exercise Name 6 -- heel prop  -MH     Cueing 6 -- Verbal  -MH     Reps 6 -- 3# above knee  -MH     Time 6 -- 5 min  -MH        Exercise 7    Exercise Name 7 -- QS  -MH     Cueing 7 -- Verbal  -MH     Reps 7 -- 20  -MH     Time 7 -- 5 sec  -MH               User Key  (r) = Recorded By, (t) = Taken By, (c) = Cosigned By      Initials Name Provider Type    Edi Moncada PTA Physical Therapist Assistant                             Manual Rx (Last 36 Hours)       Manual Treatments       Row Name 04/26/24 1628 04/26/24 1400          Total Minutes    59699 - PT Manual Therapy Minutes 20  -MH --        Manual Rx 1    Manual Rx 1 Location -- (L) knee - pt seated  -     Manual Rx 1 Type -- PROM:  flexion  -MH     Manual Rx 1 Grade -- post tib mobs throughout  -     Manual Rx 1 Duration -- 10 x 10 sec  -MH        Manual Rx 2    Manual Rx 2 Location -- (L) knee - pt supine  -     Manual Rx 2 Type -- PROM: extension  -     Manual Rx 2 Duration -- 10 x 10 sec  -               User Key  (r) = Recorded By, (t) = Taken By, (c) = Cosigned By      Initials Name Provider Type    Edi Moncada PTA Physical Therapist  Assistant                        Therapy Education  Given: HEP  Program: Reinforced  How Provided: Verbal  Provided to: Patient  Level of Understanding: Verbalized, Demonstrated              Time Calculation:   Start Time: 1457  Stop Time: 1629  Time Calculation (min): 92 min  Timed Charges  19826 - PT Therapeutic Exercise Minutes: 30  37666 - PT Manual Therapy Minutes: 20  Untimed Charges  PT Moist Heat Minutes: 8 (8 minutes of heat only prior to starting heat/stretch - pt supine)  PT Ice Rx Minutes: 10  Total Minutes  Timed Charges Total Minutes: 50  Untimed Charges Total Minutes: 18   Total Minutes: 50                Edi Winchester, PTA  4/26/2024

## 2024-04-30 ENCOUNTER — HOSPITAL ENCOUNTER (OUTPATIENT)
Dept: PHYSICAL THERAPY | Facility: HOSPITAL | Age: 44
Setting detail: THERAPIES SERIES
Discharge: HOME | End: 2024-04-30
Payer: COMMERCIAL

## 2024-04-30 DIAGNOSIS — Z96.652 STATUS POST LEFT PARTIAL KNEE REPLACEMENT: Primary | ICD-10-CM

## 2024-04-30 NOTE — THERAPY TREATMENT NOTE
Outpatient Physical Therapy Ortho Treatment Note   Jazz Bullock     Patient Name: Aida Peralta  : 1980  MRN: [id number]  Today's Date: 2024      Visit Date: 2024    Visit Dx:    ICD-10-CM ICD-9-CM   1. Status post left partial knee replacement  Z96.652 V43.65       Patient Active Problem List   Diagnosis    YULIA (generalized anxiety disorder)    History of seizure    Hypertension    Hypothyroidism    Primary osteoarthritis of both knees    Morbid obesity    Plica of knee, left    Adjustment disorder    Status post right partial knee replacement    Status post left partial knee replacement        Past Medical History:   Diagnosis Date    CTS (carpal tunnel syndrome)     GERD (gastroesophageal reflux disease)     Hypertension     Migraines     Plica of knee, left 10/22/2021    Primary osteoarthritis of both knees 10/22/2021    Seizure     one in  2018, no meds    Sleep apnea     no machine        Past Surgical History:   Procedure Laterality Date    CHOLECYSTECTOMY      KNEE ARTHROPLASTY, PARTIAL REPLACEMENT Right 2023    Procedure: KNEE ARTHROPLASTY, PARTIAL REPLACEMENT;  Surgeon: Siddhartha Pretty MD;  Location: [facility];  Service: Robotics - Ortho;  Laterality: Right;    TOTAL KNEE ARTHROPLASTY Left 2023    Procedure: KNEE ARTHROPLASTY UNICOMPARTMENTAL;  Surgeon: Siddhartha Pretty MD;  Location: [facility];  Service: Orthopedics;  Laterality: Left;    TUBAL ABDOMINAL LIGATION          PT Ortho       Row Name 24 1500       Subjective    Subjective Comments pt states she did not have her ace wrap at work today and her knee is feeling more tight  -              User Key  (r) = Recorded By, (t) = Taken By, (c) = Cosigned By      Initials Name Provider Type     Edi Winchester, PTA Physical Therapist Assistant                                 PT Assessment/Plan       Row Name 24 9413          PT Assessment    Assessment Comments pt with increased tightness after day  of work but tolerated treatment well and reporting decreased symptoms after session  -        PT Plan    PT Plan Comments Cont to push ROM  -               User Key  (r) = Recorded By, (t) = Taken By, (c) = Cosigned By      Initials Name Provider Type     Edi Winchester PTA Physical Therapist Assistant                     Modalities       Row Name 04/30/24 1500             Precautions    Existing Precautions/Restrictions no known precautions/restrictions  -         Moist Heat    Location left thigh and knee with patient in short sit on edge of table with stool (weighted) at ankle for static knee flexion stretch  -      PT Moist Heat Minutes 8  8 minutes of heat only prior to starting heat/stretch - pt supine  -         Ice    Ice S/P Rx No  -      Patient denies application of Ice Yes  pt needing to leave for work meeting  -                User Key  (r) = Recorded By, (t) = Taken By, (c) = Cosigned By      Initials Name Provider Type     Edi Winchester PTA Physical Therapist Assistant                   OP Exercises       Row Name 04/30/24 1657 04/30/24 1500          Precautions    Existing Precautions/Restrictions -- no known precautions/restrictions  -        Subjective    Subjective Comments -- pt states she did not have her ace wrap at work today and her knee is feeling more tight  -        Total Minutes    66923 - PT Therapeutic Exercise Minutes 30  -MH --     42153 - PT Manual Therapy Minutes 25  -MH --        Exercise 1    Exercise Name 1 -- Heelslides with sheet  -     Cueing 1 -- Verbal;Tactile;Demo  -     Time 1 -- 8 minutes  -        Exercise 2    Exercise Name 2 -- Wallslides  -     Cueing 2 -- Verbal;Tactile;Demo  -     Time 2 -- 8 minutes  -        Exercise 3    Exercise Name 3 -- Bike- airdyne seat 2  -     Cueing 3 -- Verbal;Tactile;Demo  -     Time 3 -- 8 min  -        Exercise 4    Exercise Name 4 -- knee flexion stretch - on steps  -     Cueing 4 --  Verbal  -MH     Reps 4 -- 9  -MH     Time 4 -- 10 sec  -MH        Exercise 5    Exercise Name 5 -- supine HS stretch with sheet  -     Cueing 5 -- Verbal;Tactile;Demo  -MH     Reps 5 -- 10  -MH     Time 5 -- 10 sec  -MH        Exercise 6    Exercise Name 6 -- heel prop  -     Cueing 6 -- Verbal  -MH     Reps 6 -- 3# above knee  -MH     Time 6 -- 5 min  -MH        Exercise 7    Exercise Name 7 -- QS  -MH     Cueing 7 -- Verbal  -MH     Reps 7 -- 20  -MH     Time 7 -- 5 sec  -               User Key  (r) = Recorded By, (t) = Taken By, (c) = Cosigned By      Initials Name Provider Type    Edi Moncada PTA Physical Therapist Assistant                             Manual Rx (Last 36 Hours)       Manual Treatments       Row Name 04/30/24 1657 04/30/24 1500          Total Minutes    39781 - PT Manual Therapy Minutes 25  -MH --        Manual Rx 1    Manual Rx 1 Location -- (L) knee - pt seated  -     Manual Rx 1 Type -- PROM:  flexion  -     Manual Rx 1 Grade -- post tib mobs throughout  -     Manual Rx 1 Duration -- 10 x 10 sec  -        Manual Rx 2    Manual Rx 2 Location -- (L) knee - pt supine  -     Manual Rx 2 Type -- PROM: extension  -     Manual Rx 2 Duration -- 10 x 10 sec  -               User Key  (r) = Recorded By, (t) = Taken By, (c) = Cosigned By      Initials Name Provider Type    Edi Moncada PTA Physical Therapist Assistant                        Therapy Education  Given: HEP, Symptoms/condition management  Program: Reinforced  How Provided: Verbal  Provided to: Patient  Level of Understanding: Verbalized, Demonstrated              Time Calculation:   Start Time: 1457  Stop Time: 1629  Time Calculation (min): 92 min  Timed Charges  40638 - PT Therapeutic Exercise Minutes: 30  43610 - PT Manual Therapy Minutes: 25  Untimed Charges  PT Moist Heat Minutes: 8 (8 minutes of heat only prior to starting heat/stretch - pt supine)  Total Minutes  Timed Charges Total Minutes:  55  Untimed Charges Total Minutes: 8   Total Minutes: 55                Edi Winchester, PTA  4/30/2024

## 2024-05-02 ENCOUNTER — HOSPITAL ENCOUNTER (OUTPATIENT)
Dept: PHYSICAL THERAPY | Facility: HOSPITAL | Age: 44
Setting detail: THERAPIES SERIES
Discharge: HOME | End: 2024-05-02
Payer: COMMERCIAL

## 2024-05-02 DIAGNOSIS — Z96.652 STATUS POST LEFT PARTIAL KNEE REPLACEMENT: Primary | ICD-10-CM

## 2024-05-02 NOTE — THERAPY TREATMENT NOTE
Outpatient Physical Therapy Ortho Treatment Note   Jazz Bullock     Patient Name: Aida Peralta  : 1980  MRN: [id number]  Today's Date: 2024      Visit Date: 2024    Visit Dx:    ICD-10-CM ICD-9-CM   1. Status post left partial knee replacement  Z96.652 V43.65       Patient Active Problem List   Diagnosis    YULIA (generalized anxiety disorder)    History of seizure    Hypertension    Hypothyroidism    Primary osteoarthritis of both knees    Morbid obesity    Plica of knee, left    Adjustment disorder    Status post right partial knee replacement    Status post left partial knee replacement        Past Medical History:   Diagnosis Date    CTS (carpal tunnel syndrome)     GERD (gastroesophageal reflux disease)     Hypertension     Migraines     Plica of knee, left 10/22/2021    Primary osteoarthritis of both knees 10/22/2021    Seizure     one in  , no meds    Sleep apnea     no machine        Past Surgical History:   Procedure Laterality Date    CHOLECYSTECTOMY      KNEE ARTHROPLASTY, PARTIAL REPLACEMENT Right 2023    Procedure: KNEE ARTHROPLASTY, PARTIAL REPLACEMENT;  Surgeon: Siddhartha Pretty MD;  Location: [facility] OR;  Service: Robotics - Ortho;  Laterality: Right;    TOTAL KNEE ARTHROPLASTY Left 2023    Procedure: KNEE ARTHROPLASTY UNICOMPARTMENTAL;  Surgeon: Siddhartha Pretty MD;  Location: [facility] OR;  Service: Orthopedics;  Laterality: Left;    TUBAL ABDOMINAL LIGATION          PT Ortho       Row Name 24 1600       Subjective    Subjective Comments Patient reports that she woke with medial aspect left pain today that she cannot relate to any change in activity.  -SP       Left Lower Ext    Lt Knee Extension/Flexion PROM 100 degrees PROM after stretches  -SP              User Key  (r) = Recorded By, (t) = Taken By, (c) = Cosigned By      Initials Name Provider Type    Carol Hwang, PT Physical Therapist                                 PT  Assessment/Plan       Row Name 05/02/24 1633          PT Assessment    Assessment Comments Patient with gradually progressing left knee PROM to 100 degrees today.  -SP        PT Plan    PT Plan Comments Continue to progress as tolerable  -SP               User Key  (r) = Recorded By, (t) = Taken By, (c) = Cosigned By      Initials Name Provider Type    Carol Hwang, PT Physical Therapist                     Modalities       Row Name 05/02/24 1600             Precautions    Existing Precautions/Restrictions no known precautions/restrictions  -SP         Moist Heat    MH Applied Yes  -SP      Location left thigh and knee with patient in short sit on edge of table with stool (weighted) at ankle for static knee flexion stretch  -SP      PT Moist Heat Minutes 8  8 minutes of heat only prior to starting heat/stretch - pt supine  -SP         Ice    Ice Applied Yes  -SP      Location anerior and posterior left knee  -SP      Ice S/P Rx No  -SP      Patient denies application of Ice Yes  pt needing to leave for work meeting  -SP                User Key  (r) = Recorded By, (t) = Taken By, (c) = Cosigned By      Initials Name Provider Type    Carol Hwang, PT Physical Therapist                   OP Exercises       Row Name 05/02/24 1600             Precautions    Existing Precautions/Restrictions no known precautions/restrictions  -SP         Subjective    Subjective Comments Patient reports that she woke with medial aspect left pain today that she cannot relate to any change in activity.  -SP         Exercise 1    Exercise Name 1 Heelslides with sheet  -SP      Cueing 1 Verbal;Tactile;Demo  -SP      Time 1 8 minutes  -SP         Exercise 2    Exercise Name 2 Wallslides  -SP      Cueing 2 Verbal;Tactile;Demo  -SP      Time 2 8 minutes  -SP         Exercise 3    Exercise Name 3 Bike- airdyne seat 2  -SP      Cueing 3 Verbal;Tactile;Demo  -SP      Time 3 8 min  -SP         Exercise 4    Exercise  Name 4 knee flexion stretch - on steps  -SP      Cueing 4 Verbal  -SP      Reps 4 9  -SP      Time 4 10 sec  -SP         Exercise 5    Exercise Name 5 supine HS stretch with sheet  -SP      Cueing 5 Verbal;Tactile;Demo  -SP      Reps 5 10  -SP      Time 5 10 sec  -SP         Exercise 6    Exercise Name 6 heel prop  -SP      Cueing 6 Verbal  -SP      Reps 6 3# above knee  -SP      Time 6 5 min  -SP         Exercise 7    Exercise Name 7 QS  -SP      Cueing 7 Verbal  -SP      Reps 7 20  -SP      Time 7 5 sec  -SP                User Key  (r) = Recorded By, (t) = Taken By, (c) = Cosigned By      Initials Name Provider Type    Carol Hwang, KELTON Physical Therapist                             Manual Rx (Last 36 Hours)       Manual Treatments       Row Name 05/02/24 1500             Manual Rx 1    Manual Rx 1 Location (L) knee - pt seated  -SP      Manual Rx 1 Type PROM:  flexion  -SP      Manual Rx 1 Grade post tib mobs throughout  -SP      Manual Rx 1 Duration 10 x 10 sec  -SP         Manual Rx 2    Manual Rx 2 Location (L) knee - pt supine  -SP      Manual Rx 2 Type PROM: extension  -SP      Manual Rx 2 Duration 10 x 10 sec  -SP                User Key  (r) = Recorded By, (t) = Taken By, (c) = Cosigned By      Initials Name Provider Type    Carol Hwang, KELTON Physical Therapist                        Therapy Education  Education Details: Patient instructed in half knee flexion stretch and knee flexion mobilization for home.  Given: HEP  Program: New, Reinforced  How Provided: Verbal, Written  Provided to: Patient  Level of Understanding: Verbalized, Demonstrated              Time Calculation:   Start Time: 1455  Stop Time: 1645  Time Calculation (min): 110 min  Untimed Charges  PT Moist Heat Minutes: 8 (8 minutes of heat only prior to starting heat/stretch - pt supine)  Total Minutes  Untimed Charges Total Minutes: 8   Total Minutes: 8                Carol Enrique PT  5/2/2024

## 2024-05-09 ENCOUNTER — HOSPITAL ENCOUNTER (OUTPATIENT)
Dept: PHYSICAL THERAPY | Facility: HOSPITAL | Age: 44
Setting detail: THERAPIES SERIES
Discharge: HOME | End: 2024-05-09
Payer: COMMERCIAL

## 2024-05-09 DIAGNOSIS — Z96.652 STATUS POST LEFT PARTIAL KNEE REPLACEMENT: Primary | ICD-10-CM

## 2024-05-16 ENCOUNTER — HOSPITAL ENCOUNTER (OUTPATIENT)
Dept: PHYSICAL THERAPY | Facility: HOSPITAL | Age: 44
Setting detail: THERAPIES SERIES
Discharge: HOME | End: 2024-05-16
Payer: COMMERCIAL

## 2024-05-16 DIAGNOSIS — Z96.652 STATUS POST LEFT PARTIAL KNEE REPLACEMENT: Primary | ICD-10-CM

## 2024-05-16 NOTE — THERAPY TREATMENT NOTE
Outpatient Physical Therapy Ortho Treatment Note   Jazz Bullock     Patient Name: Aida Peralta  : 1980  MRN: [id number]  Today's Date: 2024      Visit Date: 2024    Visit Dx:    ICD-10-CM ICD-9-CM   1. Status post left partial knee replacement  Z96.652 V43.65       Patient Active Problem List   Diagnosis    YULIA (generalized anxiety disorder)    History of seizure    Hypertension    Hypothyroidism    Primary osteoarthritis of both knees    Morbid obesity    Plica of knee, left    Adjustment disorder    Status post right partial knee replacement    Status post left partial knee replacement        Past Medical History:   Diagnosis Date    CTS (carpal tunnel syndrome)     GERD (gastroesophageal reflux disease)     Hypertension     Migraines     Plica of knee, left 10/22/2021    Primary osteoarthritis of both knees 10/22/2021    Seizure     one in  , no meds    Sleep apnea     no machine        Past Surgical History:   Procedure Laterality Date    CHOLECYSTECTOMY      KNEE ARTHROPLASTY, PARTIAL REPLACEMENT Right 2023    Procedure: KNEE ARTHROPLASTY, PARTIAL REPLACEMENT;  Surgeon: Siddhartha Pretty MD;  Location: [facility];  Service: Robotics - Ortho;  Laterality: Right;    TOTAL KNEE ARTHROPLASTY Left 2023    Procedure: KNEE ARTHROPLASTY UNICOMPARTMENTAL;  Surgeon: Siddhartha Pretty MD;  Location: [facility];  Service: Orthopedics;  Laterality: Left;    TUBAL ABDOMINAL LIGATION          PT Ortho       Row Name 24 1500       Subjective    Subjective Comments pt states she is not noticing much change in her flexion but does feel like she is standing/walking strange - feels she is losing extension ROM despite constantly stretching at home  -       Left Lower Ext    Lt Knee Extension/Flexion AROM 98  degrees flexion, seated post stretches  -              User Key  (r) = Recorded By, (t) = Taken By, (c) = Cosigned By      Initials Name Provider Type    Edi Moncada,  ALESIA Physical Therapist Assistant                                 PT Assessment/Plan       Row Name 05/16/24 1620          PT Assessment    Assessment Comments pt presents with increased AROM flexion but slight decrease in extension; continued improvement in PROM tolerance  -        PT Plan    PT Plan Comments Cont - focus on ROM; ptt to trial one week of HEP only then return to clinic for follow up  -               User Key  (r) = Recorded By, (t) = Taken By, (c) = Cosigned By      Initials Name Provider Type    Edi Moncada PTA Physical Therapist Assistant                     Modalities       Row Name 05/16/24 1500             Precautions    Existing Precautions/Restrictions no known precautions/restrictions  -         Moist Heat    Location left thigh and knee with patient in short sit on edge of table with stool (weighted) at ankle for static knee flexion stretch  -      PT Moist Heat Minutes 6  6 minutes of heat only prior to starting heat/stretch - pt supine  -         Ice    Location anerior and posterior left knee  -      PT Ice Rx Minutes 10  -      Ice S/P Rx Yes  -      Patient denies application of Ice --  -                User Key  (r) = Recorded By, (t) = Taken By, (c) = Cosigned By      Initials Name Provider Type     Edi Winchester PTA Physical Therapist Assistant                   OP Exercises       Row Name 05/16/24 1500             Precautions    Existing Precautions/Restrictions no known precautions/restrictions  -         Subjective    Subjective Comments pt states she is not noticing much change in her flexion but does feel like she is standing/walking strange - feels she is losing extension ROM despite constantly stretching at home  -         Exercise 1    Exercise Name 1 Heelslides with sheet  -      Cueing 1 Verbal;Tactile;Demo  -      Time 1 8 minutes  -         Exercise 2    Exercise Name 2 Wallslides  -      Cueing 2 Verbal;Tactile;Demo  -       Time 2 8 minutes  -MH         Exercise 3    Exercise Name 3 Bike- airdyne seat 2  -MH      Cueing 3 Verbal;Tactile;Demo  -MH      Time 3 8 min  -MH         Exercise 4    Exercise Name 4 knee flexion stretch - on steps  -MH      Cueing 4 Verbal  -MH      Reps 4 10  -MH      Time 4 10 sec  -MH         Exercise 5    Exercise Name 5 supine HS stretch with sheet  -MH      Cueing 5 Verbal;Tactile;Demo  -MH      Reps 5 10  -MH      Time 5 10 sec  -MH         Exercise 6    Exercise Name 6 heel prop  -MH      Cueing 6 Verbal  -MH      Reps 6 3# above knee  -MH      Time 6 5 min  -MH         Exercise 7    Exercise Name 7 QS  -MH                User Key  (r) = Recorded By, (t) = Taken By, (c) = Cosigned By      Initials Name Provider Type    Edi Moncada PTA Physical Therapist Assistant                             Manual Rx (Last 36 Hours)       Manual Treatments       Row Name 05/16/24 1500             Manual Rx 1    Manual Rx 1 Location (L) knee - pt seated  -      Manual Rx 1 Type PROM:  flexion  -MH      Manual Rx 1 Grade post tib mobs throughout  -MH      Manual Rx 1 Duration 10 x 10 sec  -MH         Manual Rx 2    Manual Rx 2 Location (L) knee - pt supine  -      Manual Rx 2 Type PROM: extension  -MH      Manual Rx 2 Duration 10 x 10 sec  -MH                User Key  (r) = Recorded By, (t) = Taken By, (c) = Cosigned By      Initials Name Provider Type    Edi Moncada PTA Physical Therapist Assistant                        Therapy Education  Education Details: writtten instruction of knee extension ex's issued and reviewed for HEP  Given: HEP, Symptoms/condition management  Program: New, Reinforced  How Provided: Verbal, Written, Demonstration  Provided to: Patient  Level of Understanding: Teach back education performed, Verbalized, Demonstrated              Time Calculation:   Untimed Charges  PT Moist Heat Minutes: 6 (6 minutes of heat only prior to starting heat/stretch - pt supine)  PT Ice Rx  Minutes: 10  Total Minutes  Untimed Charges Total Minutes: 16   Total Minutes: 16                Edi Winchester PTA  5/16/2024

## 2024-05-30 ENCOUNTER — HOSPITAL ENCOUNTER (OUTPATIENT)
Dept: PHYSICAL THERAPY | Facility: HOSPITAL | Age: 44
Setting detail: THERAPIES SERIES
Discharge: HOME | End: 2024-05-30
Payer: COMMERCIAL

## 2024-05-30 DIAGNOSIS — Z96.652 STATUS POST LEFT PARTIAL KNEE REPLACEMENT: Primary | ICD-10-CM

## 2024-05-30 NOTE — THERAPY TREATMENT NOTE
"  Outpatient Physical Therapy Ortho Treatment Note   Jazz Bullock     Patient Name: Aida Peralta  : 1980  MRN: [id number]  Today's Date: 2024      Visit Date: 2024    Visit Dx:    ICD-10-CM ICD-9-CM   1. Status post left partial knee replacement  Z96.652 V43.65       Patient Active Problem List   Diagnosis    YULIA (generalized anxiety disorder)    History of seizure    Hypertension    Hypothyroidism    Primary osteoarthritis of both knees    Morbid obesity    Plica of knee, left    Adjustment disorder    Status post right partial knee replacement    Status post left partial knee replacement        Past Medical History:   Diagnosis Date    CTS (carpal tunnel syndrome)     GERD (gastroesophageal reflux disease)     Hypertension     Migraines     Plica of knee, left 10/22/2021    Primary osteoarthritis of both knees 10/22/2021    Seizure     one in  2018, no meds    Sleep apnea     no machine        Past Surgical History:   Procedure Laterality Date    CHOLECYSTECTOMY      KNEE ARTHROPLASTY, PARTIAL REPLACEMENT Right 2023    Procedure: KNEE ARTHROPLASTY, PARTIAL REPLACEMENT;  Surgeon: Siddhartha Pretty MD;  Location: [facility] OR;  Service: Robotics - Ortho;  Laterality: Right;    TOTAL KNEE ARTHROPLASTY Left 2023    Procedure: KNEE ARTHROPLASTY UNICOMPARTMENTAL;  Surgeon: Siddhartha Pretty MD;  Location: [facility] OR;  Service: Orthopedics;  Laterality: Left;    TUBAL ABDOMINAL LIGATION          PT Ortho       Row Name 24 1500       Subjective    Subjective Comments pt states she has been doing new extension ex's but does not feel she has regained her extension; reports unexplained increase in \"soreness\" posterior knee, along with significant frustration with continued symptoms  -       Left Lower Ext    Lt Knee Extension/Flexion AROM 4 - 96 degrees, supine post stretches  -              User Key  (r) = Recorded By, (t) = Taken By, (c) = Cosigned By      Initials Name Provider " "Type     Edi Winchester PTA Physical Therapist Assistant                                 PT Assessment/Plan       Row Name 05/30/24 1645          PT Assessment    Assessment Comments pt continues with AROM deficits both flexion and extension that contributes to altered gait but pt continues to report overall decreased pain in comparison to pre-surgery; pt has had limited progression recently of ROM and exhibits frustration but is ready to trial HEP only at this time; pt had similar response with (R) TKA and ROM eventually improved - pt counting on this with her (L) knee  -        PT Plan    PT Plan Comments Discharge to [facility]  -               User Key  (r) = Recorded By, (t) = Taken By, (c) = Cosigned By      Initials Name Provider Type     Annabella Edi Gonzales PTA Physical Therapist Assistant                     Modalities       Row Name 05/30/24 1500             Precautions    Existing Precautions/Restrictions no known precautions/restrictions  -         Moist Heat    Location (L) ant/post knee and calf - pt supine  -      PT Moist Heat Minutes 10  -         Ice    Location (L) ant/post knee - pt supine  -      PT Ice Rx Minutes 10  -      Ice S/P Rx Yes  -                User Key  (r) = Recorded By, (t) = Taken By, (c) = Cosigned By      Initials Name Provider Type     Edi Winchester PTA Physical Therapist Assistant                   OP Exercises       Row Name 05/30/24 1500             Precautions    Existing Precautions/Restrictions no known precautions/restrictions  -         Subjective    Subjective Comments pt states she has been doing new extension ex's but does not feel she has regained her extension; reports unexplained increase in \"soreness\" posterior knee, along with significant frustration with continued symptoms  -         Exercise 1    Exercise Name 1 Heelslides with sheet  -      Cueing 1 Verbal;Tactile;Demo  -      Time 1 8 minutes  -         Exercise 2    Exercise " Name 2 Wallslides  -MH      Cueing 2 Verbal;Tactile;Demo  -MH      Time 2 8 minutes  -MH         Exercise 3    Exercise Name 3 Bike- airdyne seat 2  -MH      Cueing 3 Verbal;Tactile;Demo  -MH      Time 3 8 min  -MH         Exercise 4    Exercise Name 4 knee flexion stretch - on steps  -MH      Cueing 4 Verbal  -MH      Reps 4 10  -MH      Time 4 10 sec  -MH         Exercise 5    Exercise Name 5 supine HS stretch with sheet  -MH      Cueing 5 Verbal;Tactile;Demo  -MH      Reps 5 10  -MH      Time 5 10 sec  -MH         Exercise 6    Exercise Name 6 heel prop  -MH      Cueing 6 Verbal  -MH      Reps 6 3# above knee  -MH      Time 6 5 min  -MH         Exercise 7    Exercise Name 7 QS  -MH      Cueing 7 Verbal  -MH      Reps 7 25  -MH                User Key  (r) = Recorded By, (t) = Taken By, (c) = Cosigned By      Initials Name Provider Type    Edi Moncada PTA Physical Therapist Assistant                             Manual Rx (Last 36 Hours)       Manual Treatments       Row Name 05/30/24 1500             Manual Rx 1    Manual Rx 1 Location (L) knee - pt seated  -MH      Manual Rx 1 Type PROM:  flexion  -MH      Manual Rx 1 Grade post tib mobs throughout  -MH      Manual Rx 1 Duration 10 x 10 sec  -MH         Manual Rx 2    Manual Rx 2 Location (L) knee - pt supine  -MH      Manual Rx 2 Type PROM: extension  -MH      Manual Rx 2 Duration 10 x 10 sec  -MH                User Key  (r) = Recorded By, (t) = Taken By, (c) = Cosigned By      Initials Name Provider Type    Edi Moncada PTA Physical Therapist Assistant                        Therapy Education  Given: HEP, Symptoms/condition management  Program: Reinforced  How Provided: Verbal  Provided to: Patient  Level of Understanding: Teach back education performed, Verbalized, Demonstrated              Time Calculation:   Untimed Charges  PT Moist Heat Minutes: 10  PT Ice Rx Minutes: 10  Total Minutes  Untimed Charges Total Minutes: 20   Total Minutes:  20                Edi Winchester, PTA  5/30/2024

## (undated) DEVICE — DECANTER: Brand: UNBRANDED

## (undated) DEVICE — ZIPPERED TOGA, 2X LARGE: Brand: FLYTE

## (undated) DEVICE — WRAP KNEE COLD THERAPY

## (undated) DEVICE — BNDG ELAS ELITE V/CLOSE 6IN 5YD LF STRL

## (undated) DEVICE — STERILE PATIENT PROTECTIVE PAD FOR IMP® KNEE POSITIONERS & COHESIVE WRAP (10 / CASE): Brand: DE MAYO KNEE POSITIONER®

## (undated) DEVICE — SYR LUERLOK 30CC

## (undated) DEVICE — ANTIBACTERIAL UNDYED BRAIDED (POLYGLACTIN 910), SYNTHETIC ABSORBABLE SUTURE: Brand: COATED VICRYL

## (undated) DEVICE — THE STERILE CAMERA HANDLE COVER IS FOR USE WITH THE STERIS SURGICAL LIGHTING AND VISUALIZATION SYSTEMS.

## (undated) DEVICE — DUAL CUT SAGITTAL BLADE

## (undated) DEVICE — GLV SURG SENSICARE PI MIC PF SZ7 LF STRL

## (undated) DEVICE — NEEDLE, QUINCKE, 18GX3.5": Brand: MEDLINE

## (undated) DEVICE — UNDERGLV SURG BIOGEL INDICAT PI SZ8 BLU

## (undated) DEVICE — DISPOSABLE TOURNIQUET CUFF 30"X4", 1-LINE, WHITE, STERILE, 1EA/PK, 10PK/CS: Brand: ASP MEDICAL

## (undated) DEVICE — TP INSRT BEAR PERSONA

## (undated) DEVICE — RECIPROCATING BLADE HEAVY DUTY LONG, OFFSET  (77.6 X 0.77 X 11.2MM)

## (undated) DEVICE — IRRIGATOR BULB ASEPTO 60CC STRL

## (undated) DEVICE — THE STERILE LIGHT HANDLE COVER IS USED WITH STERIS SURGICAL LIGHTING AND VISUALIZATION SYSTEMS.

## (undated) DEVICE — TUBING, SUCTION, 1/4" X 12', STRAIGHT: Brand: MEDLINE

## (undated) DEVICE — PICO 7 10CM X 30CM: Brand: PICO™ 7

## (undated) DEVICE — ADHS SKIN PREMIERPRO EXOFIN TOPICAL HI/VISC .5ML

## (undated) DEVICE — DRP ROBOTIC ROSA BX/20

## (undated) DEVICE — FRAZIER SUCTION INSTRUMENT 10 FR W/CONTROL VENT & OBTURATOR: Brand: FRAZIER

## (undated) DEVICE — KT SURG TURNOVER 050

## (undated) DEVICE — PAD UNDERCAST WYTEX 4IN 4YD LF STRL

## (undated) DEVICE — CEMENT MIXING SYSTEM WITH FEMORAL BREAKWAY NOZZLE: Brand: REVOLUTION

## (undated) DEVICE — 450 ML BOTTLE OF 0.05% CHLORHEXIDINE GLUCONATE IN 99.95% STERILE WATER FOR IRRIGATION, USP AND APPLICATOR.: Brand: IRRISEPT ANTIMICROBIAL WOUND LAVAGE

## (undated) DEVICE — SOL IRRIG NACL 1000ML

## (undated) DEVICE — SOL IRR NACL 0.9PCT ARTHROMATIC 3000ML

## (undated) DEVICE — SOLUTION,WATER,IRRIGATION,1000ML,STERILE: Brand: MEDLINE

## (undated) DEVICE — PAD UNDERCAST WYTEX 6IN 4YD LF STRL

## (undated) DEVICE — DRAPE,C-SECTION,CLR SCREEN,WIRE: Brand: MEDLINE

## (undated) DEVICE — PK TOTL KN 50

## (undated) DEVICE — GLV SURG SENSICARE PI LF PF 8 GRN STRL

## (undated) DEVICE — GLV SURG SIGNATURE ESSENTIAL PF LTX SZ8.5

## (undated) DEVICE — PLASMABLADE PS210-030S 3.0S LOCK: Brand: PLASMABLADE™

## (undated) DEVICE — GLV SURG SENSICARE PI ORTHO SZ8.5 LF STRL